# Patient Record
Sex: MALE | Race: WHITE | NOT HISPANIC OR LATINO | Employment: FULL TIME | URBAN - METROPOLITAN AREA
[De-identification: names, ages, dates, MRNs, and addresses within clinical notes are randomized per-mention and may not be internally consistent; named-entity substitution may affect disease eponyms.]

---

## 2018-07-17 ENCOUNTER — OFFICE VISIT (OUTPATIENT)
Dept: FAMILY MEDICINE | Facility: CLINIC | Age: 58
End: 2018-07-17
Payer: COMMERCIAL

## 2018-07-17 VITALS
SYSTOLIC BLOOD PRESSURE: 120 MMHG | WEIGHT: 185 LBS | HEIGHT: 71 IN | HEART RATE: 51 BPM | BODY MASS INDEX: 25.9 KG/M2 | TEMPERATURE: 98 F | OXYGEN SATURATION: 97 % | DIASTOLIC BLOOD PRESSURE: 86 MMHG

## 2018-07-17 DIAGNOSIS — Z12.11 COLON CANCER SCREENING: ICD-10-CM

## 2018-07-17 DIAGNOSIS — N40.1 BENIGN PROSTATIC HYPERPLASIA WITH URINARY OBSTRUCTION: ICD-10-CM

## 2018-07-17 DIAGNOSIS — Z23 NEED FOR TDAP VACCINATION: ICD-10-CM

## 2018-07-17 DIAGNOSIS — Z00.00 ANNUAL PHYSICAL EXAM: Primary | ICD-10-CM

## 2018-07-17 DIAGNOSIS — N13.8 BENIGN PROSTATIC HYPERPLASIA WITH URINARY OBSTRUCTION: ICD-10-CM

## 2018-07-17 PROCEDURE — 99999 PR PBB SHADOW E&M-EST. PATIENT-LVL III: CPT | Mod: PBBFAC,,, | Performed by: FAMILY MEDICINE

## 2018-07-17 PROCEDURE — 99386 PREV VISIT NEW AGE 40-64: CPT | Mod: S$GLB,,, | Performed by: FAMILY MEDICINE

## 2018-07-17 NOTE — PROGRESS NOTES
Office Visit    Patient Name: Dandy Gale    : 1960  MRN: [id number]      Assessment/Plan:  Dandy Gale is a 57 y.o. male who presents today for :    Annual physical exam  -     Hemoglobin A1c; Future; Expected date: 2018  -     CBC Without Differential; Future; Expected date: 2018  -     Comprehensive metabolic panel; Future; Expected date: 2018  -     Hepatitis C antibody; Future; Expected date: 2018  -     Lipid panel; Future; Expected date: 2018    Benign prostatic hyperplasia with urinary obstruction  -     PSA, Screening; Future; Expected date: 2018    Colon cancer screening  -     Case request GI: COLONOSCOPY    Need for Tdap vaccination      -anticipatory guidance provided with age appropriate preventative services discussed, healthy diet and regular physical exercise also discussed with patient  -Patient was advised to get immunization at the pharmacy.  -call clinic back with any questions or concerns    Follow-up for any evaluation as needed.     This note was created by combination of typed  and Dragon dictation.  Transcription errors may be present.  If there are any questions, please contact me.        ----------------------------------------------------------------------------------------------------------------------      HPI:  Dandy is a 57 y.o. male who presents today for:  Annual Exam        Patient Care Team:  Jareth Ventura MD as PCP - General (Family Medicine)  This patient has multiple medical diagnoses as noted below.    This patient is new to me   Patient is doing well and has no major complaints.  Patient denies illicit drug use. No smoking. +Social alcohol use  Does endorse polyuria - which has been a chronic issue for him. Had been evaluated by Urology in the past, but is not on any medications. He does admit to drinking about 6 cups of coffee daily - no urinary changes in the past year. Last PSA was 4.1        Additional  ROS  No F/C/wt changes/fatigue  No dysphagia/sore throat/rhinorrhea  No CP/SOB/palpitations/swelling  No cough/wheezing/SOB  No nausea/vomiting/abd pain/no diarrhea, no constipation, no blood in stool  No muscle aches/joint pain   No rashes  No weakness/HA/tingling/numbness  No anxiety/depression  No dysuria/hematuria  No polydipsia/fatigue/cold or hot intolerance/nocturia      Patient Active Problem List   Diagnosis    Fracture of humerus, proximal, left, closed    Fracture of metacarpal base of left hand, closed    Hand pain, left    First metacarpal bone fracture    Benign prostatic hyperplasia with urinary obstruction       Current Medications  Medications reviewed and updated.       Current Outpatient Prescriptions:     multivitamin capsule, Take 1 capsule by mouth once daily., Disp: , Rfl:     Past Surgical History:   Procedure Laterality Date    FRACTURE SURGERY      l shoulder plate       l thumb plate insertion         Family History   Problem Relation Age of Onset    Prostate cancer Father     Hypertension Mother     Cancer Paternal Grandmother     Heart disease Maternal Grandmother     Melanoma Neg Hx        Social History     Social History    Marital status:      Spouse name: N/A    Number of children: N/A    Years of education: N/A     Occupational History    Not on file.     Social History Main Topics    Smoking status: Former Smoker     Packs/day: 1.00     Years: 10.00    Smokeless tobacco: Former User     Quit date: 2/1/1990    Alcohol use 1.2 oz/week     2 Glasses of wine per week    Drug use: Unknown    Sexual activity: Yes     Partners: Female     Other Topics Concern    Not on file     Social History Narrative    No narrative on file           Allergies   Review of patient's allergies indicates:   Allergen Reactions    Shellfish containing products              Review of Systems  See HPI      Physical Exam  /86   Pulse (!) 51   Temp 98.4 °F (36.9 °C)  "(Oral)   Ht 5' 11" (1.803 m)   Wt 83.9 kg (185 lb)   SpO2 97%   BMI 25.80 kg/m²     GEN: NAD, well developed, pleasant, well nourished  HEENT: NCAT, PERRLA, EOMI, sclera clear, anicteric, bilateral ear exam wnl, O/P clear, MMM with no lesions  NECK: normal, supple with midline trachea, no LAD, no thyromegaly  LUNGS: CTAB, no w/r/r, no increased work of breathing   HEART: RRR, normal S1 and S2, no m/r/g, no edema  ABD: s/nt/nd, NABS  SKIN: normal turgor, no rashes  PSYCH: AOx3, appropriate mood and affect  MSK: warm/well perfused, normal ROM in all extremities, no c/c/e.          "

## 2018-07-18 ENCOUNTER — LAB VISIT (OUTPATIENT)
Dept: LAB | Facility: HOSPITAL | Age: 58
End: 2018-07-18
Attending: FAMILY MEDICINE
Payer: COMMERCIAL

## 2018-07-18 DIAGNOSIS — Z00.00 ANNUAL PHYSICAL EXAM: ICD-10-CM

## 2018-07-18 DIAGNOSIS — N13.8 BENIGN PROSTATIC HYPERPLASIA WITH URINARY OBSTRUCTION: ICD-10-CM

## 2018-07-18 DIAGNOSIS — N40.1 BENIGN PROSTATIC HYPERPLASIA WITH URINARY OBSTRUCTION: ICD-10-CM

## 2018-07-18 LAB
ALBUMIN SERPL BCP-MCNC: 3.9 G/DL
ALP SERPL-CCNC: 70 U/L
ALT SERPL W/O P-5'-P-CCNC: 19 U/L
ANION GAP SERPL CALC-SCNC: 6 MMOL/L
AST SERPL-CCNC: 20 U/L
BILIRUB SERPL-MCNC: 0.7 MG/DL
BUN SERPL-MCNC: 10 MG/DL
CALCIUM SERPL-MCNC: 9.7 MG/DL
CHLORIDE SERPL-SCNC: 103 MMOL/L
CHOLEST SERPL-MCNC: 209 MG/DL
CHOLEST/HDLC SERPL: 4.4 {RATIO}
CO2 SERPL-SCNC: 30 MMOL/L
COMPLEXED PSA SERPL-MCNC: 5.5 NG/ML
CREAT SERPL-MCNC: 1 MG/DL
ERYTHROCYTE [DISTWIDTH] IN BLOOD BY AUTOMATED COUNT: 12.5 %
EST. GFR  (AFRICAN AMERICAN): >60 ML/MIN/1.73 M^2
EST. GFR  (NON AFRICAN AMERICAN): >60 ML/MIN/1.73 M^2
ESTIMATED AVG GLUCOSE: 100 MG/DL
GLUCOSE SERPL-MCNC: 102 MG/DL
HBA1C MFR BLD HPLC: 5.1 %
HCT VFR BLD AUTO: 45.6 %
HCV AB SERPL QL IA: NEGATIVE
HDLC SERPL-MCNC: 47 MG/DL
HDLC SERPL: 22.5 %
HGB BLD-MCNC: 15.6 G/DL
LDLC SERPL CALC-MCNC: 128.8 MG/DL
MCH RBC QN AUTO: 32.2 PG
MCHC RBC AUTO-ENTMCNC: 34.2 G/DL
MCV RBC AUTO: 94 FL
NONHDLC SERPL-MCNC: 162 MG/DL
PLATELET # BLD AUTO: 252 K/UL
PMV BLD AUTO: 11.7 FL
POTASSIUM SERPL-SCNC: 4.2 MMOL/L
PROT SERPL-MCNC: 6.7 G/DL
RBC # BLD AUTO: 4.85 M/UL
SODIUM SERPL-SCNC: 139 MMOL/L
TRIGL SERPL-MCNC: 166 MG/DL
WBC # BLD AUTO: 4.53 K/UL

## 2018-07-18 PROCEDURE — 83036 HEMOGLOBIN GLYCOSYLATED A1C: CPT

## 2018-07-18 PROCEDURE — 84153 ASSAY OF PSA TOTAL: CPT

## 2018-07-18 PROCEDURE — 85027 COMPLETE CBC AUTOMATED: CPT

## 2018-07-18 PROCEDURE — 86803 HEPATITIS C AB TEST: CPT

## 2018-07-18 PROCEDURE — 80061 LIPID PANEL: CPT

## 2018-07-18 PROCEDURE — 36415 COLL VENOUS BLD VENIPUNCTURE: CPT | Mod: PO

## 2018-07-18 PROCEDURE — 80053 COMPREHEN METABOLIC PANEL: CPT

## 2018-11-01 ENCOUNTER — OFFICE VISIT (OUTPATIENT)
Dept: UROLOGY | Facility: CLINIC | Age: 58
End: 2018-11-01
Payer: COMMERCIAL

## 2018-11-01 VITALS
SYSTOLIC BLOOD PRESSURE: 143 MMHG | DIASTOLIC BLOOD PRESSURE: 79 MMHG | HEART RATE: 56 BPM | BODY MASS INDEX: 25.83 KG/M2 | WEIGHT: 185.19 LBS

## 2018-11-01 DIAGNOSIS — N13.8 BENIGN PROSTATIC HYPERPLASIA WITH URINARY OBSTRUCTION: Primary | ICD-10-CM

## 2018-11-01 DIAGNOSIS — R97.20 ELEVATED PSA: ICD-10-CM

## 2018-11-01 DIAGNOSIS — N40.1 BENIGN PROSTATIC HYPERPLASIA WITH URINARY OBSTRUCTION: Primary | ICD-10-CM

## 2018-11-01 PROCEDURE — 99999 PR PBB SHADOW E&M-EST. PATIENT-LVL III: CPT | Mod: PBBFAC,,, | Performed by: UROLOGY

## 2018-11-01 PROCEDURE — 99214 OFFICE O/P EST MOD 30 MIN: CPT | Mod: S$GLB,,, | Performed by: UROLOGY

## 2018-11-01 PROCEDURE — 3008F BODY MASS INDEX DOCD: CPT | Mod: CPTII,S$GLB,, | Performed by: UROLOGY

## 2018-11-01 RX ORDER — LIDOCAINE HYDROCHLORIDE 10 MG/ML
10 INJECTION INFILTRATION; PERINEURAL ONCE
Status: DISCONTINUED | OUTPATIENT
Start: 2018-11-01 | End: 2020-01-07

## 2018-11-01 RX ORDER — LIDOCAINE HYDROCHLORIDE 20 MG/ML
JELLY TOPICAL ONCE
Status: DISCONTINUED | OUTPATIENT
Start: 2018-11-01 | End: 2020-01-07

## 2018-11-01 RX ORDER — CIPROFLOXACIN 500 MG/1
500 TABLET ORAL 2 TIMES DAILY
Qty: 4 TABLET | Refills: 0 | Status: SHIPPED | OUTPATIENT
Start: 2018-11-01 | End: 2020-01-07

## 2018-11-01 NOTE — PROGRESS NOTES
Clinic Note  11/1/2018      Subjective:         Chief Complaint:   HPI  Dandy Gale is a 58 y.o. male with a history of BPH. Has not been checked in  3years.  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  Positive family history (father at 59, brother at 55).          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 MID FOREARM:     Family History   Problem Relation Age of Onset    Prostate cancer Father     Hypertension Mother     Cancer Paternal Grandmother     Heart disease Maternal Grandmother     Melanoma Neg Hx      Social History     Socioeconomic History    Marital status:      Spouse name: Not on file    Number of children: Not on file    Years of education: Not on file    Highest education level: Not on file   Social Needs    Financial resource strain: Not on file    Food insecurity - worry: Not on file    Food insecurity - inability: Not on file    Transportation needs - medical: Not on file    Transportation needs - non-medical: Not on file   Occupational History    Not on file   Tobacco Use    Smoking status: Former Smoker     Packs/day: 1.00     Years: 10.00     Pack years: 10.00    Smokeless tobacco: Former User     Quit date: 2/1/1990   Substance and Sexual Activity    Alcohol use: Yes     Alcohol/week: 1.2 oz     Types: 2 Glasses of wine per week    Drug use: Not on file    Sexual activity: Yes     Partners: Female   Other Topics Concern    Not on file   Social History Narrative    Not on file     Past Surgical History:   Procedure Laterality Date    FRACTURE SURGERY      l shoulder plate       l thumb plate insertion      ORIF, FRACTURE, HUMERUS Left 10/4/2013    [facility]    ORIF, FRACTURE,  "METACARPAL BONE Left 10/4/2013    [facility]    REMOVAL, IMPLANT Left 2/28/2014    [facility] at [facility]     Patient Active Problem List   Diagnosis    Fracture of humerus, proximal, left, closed    Fracture of metacarpal base of left hand, closed    Hand pain, left    First metacarpal bone fracture    Benign prostatic hyperplasia with urinary obstruction     Review of Systems   Constitutional: Negative for appetite change, chills, fatigue, fever and unexpected weight change.   HENT: Negative for nosebleeds.    Respiratory: Negative for shortness of breath and wheezing.    Cardiovascular: Negative for chest pain, palpitations and leg swelling.   Gastrointestinal: Negative for abdominal distention, abdominal pain, constipation, diarrhea, nausea and vomiting.   Genitourinary: Positive for nocturia. Negative for hematuria.        3x/noc   Musculoskeletal: Negative for arthralgias and back pain.   Skin: Negative for pallor.   Neurological: Negative for dizziness, seizures and syncope.   Hematological: Negative for adenopathy.   Psychiatric/Behavioral: Negative for dysphoric mood.         Objective:      There were no vitals taken for this visit.  Estimated body mass index is 25.8 kg/m² as calculated from the following:    Height as of 7/17/18: 5' 11" (1.803 m).    Weight as of 7/17/18: 83.9 kg (185 lb).  Physical Exam   Constitutional: He is oriented to person, place, and time. He appears well-developed and well-nourished. No distress.   HENT:   Head: Atraumatic.   Neck: No tracheal deviation present.   Cardiovascular: Normal rate.    Pulmonary/Chest: Effort normal. No respiratory distress. He has no wheezes.   Abdominal: Soft. Bowel sounds are normal. He exhibits no distension and no mass. There is no tenderness. There is no rebound and no guarding.   Genitourinary: Rectum normal. Rectal exam shows no external hemorrhoid, no internal hemorrhoid, no fissure, " no mass and no tenderness. Prostate is enlarged.   Genitourinary Comments: 50 ccs   Neurological: He is alert and oriented to person, place, and time.   Skin: Skin is warm and dry. He is not diaphoretic.     Psychiatric: He has a normal mood and affect. His behavior is normal. Judgment and thought content normal.         Assessment and Plan:           Problem List Items Addressed This Visit     Benign prostatic hyperplasia with urinary obstruction - Primary          Follow up:   Discussed PSA screening.  Reviewed NIH risk data.  Letter to Dr. Ventura.  Schedule TRUS biopsy.    Porfirio Nunez

## 2018-11-01 NOTE — LETTER
November 1, 2018        Jareth Ventura MD  [address]  [place] [address]             [facility]  [address] [facility] [address]  Phone: [phone]   Patient: Dandy Gale   MR Number: [id number]   YOB: 1960   Date of Visit: 11/1/2018       Dear Dr. Ventura:    Thank you for referring Dandy Gale to me for evaluation. Attached you will find relevant portions of my assessment and plan of care.    If you have questions, please do not hesitate to call me. I look forward to following Dandy Gale along with you.    Sincerely,      Porfirio Nunez MD            CC  No Recipients    Enclosure

## 2018-11-15 ENCOUNTER — PROCEDURE VISIT (OUTPATIENT)
Dept: UROLOGY | Facility: CLINIC | Age: 58
End: 2018-11-15
Payer: COMMERCIAL

## 2018-11-15 VITALS
BODY MASS INDEX: 26.04 KG/M2 | WEIGHT: 192.25 LBS | DIASTOLIC BLOOD PRESSURE: 82 MMHG | HEART RATE: 65 BPM | SYSTOLIC BLOOD PRESSURE: 132 MMHG | HEIGHT: 72 IN | RESPIRATION RATE: 18 BRPM | TEMPERATURE: 98 F

## 2018-11-15 DIAGNOSIS — R97.20 ELEVATED PSA: ICD-10-CM

## 2018-11-15 PROCEDURE — 76942 ECHO GUIDE FOR BIOPSY: CPT | Mod: 26,59,S$GLB, | Performed by: UROLOGY

## 2018-11-15 PROCEDURE — 88305 TISSUE EXAM BY PATHOLOGIST: CPT | Performed by: PATHOLOGY

## 2018-11-15 PROCEDURE — 88305 TISSUE EXAM BY PATHOLOGIST: CPT | Mod: 26,,, | Performed by: PATHOLOGY

## 2018-11-15 PROCEDURE — 55700 TRANSRECTAL ULTRASOUND W/ BIOPSY: CPT | Mod: S$GLB,,, | Performed by: UROLOGY

## 2018-11-15 PROCEDURE — 76872 US TRANSRECTAL: CPT | Mod: 26,S$GLB,, | Performed by: UROLOGY

## 2018-11-15 RX ORDER — LIDOCAINE HYDROCHLORIDE 20 MG/ML
JELLY TOPICAL ONCE
Status: COMPLETED | OUTPATIENT
Start: 2018-11-15 | End: 2018-11-15

## 2018-11-15 RX ORDER — LIDOCAINE HYDROCHLORIDE 10 MG/ML
10 INJECTION INFILTRATION; PERINEURAL ONCE
Status: COMPLETED | OUTPATIENT
Start: 2018-11-15 | End: 2018-11-15

## 2018-11-15 RX ADMIN — LIDOCAINE HYDROCHLORIDE: 20 JELLY TOPICAL at 02:11

## 2018-11-15 RX ADMIN — LIDOCAINE HYDROCHLORIDE 10 ML: 10 INJECTION INFILTRATION; PERINEURAL at 03:11

## 2018-11-15 NOTE — PROCEDURES
Transrectal Ultrasound w/ Biopsy  Date/Time: 11/15/2018 3:20 PM  Performed by: Porfirio Nunez MD  Authorized by: Porfirio Nunez MD     Consent Done?:  Yes (Written)  Indications: Elevated PSA    Preparation: Patient was prepped and draped in usual sterile fashion    Position:  Left lateral  Anesthesia:  Pudendal nerve block, 20cc's 1% Lidocaine and Lidocaine jelly  Patient sedated: No    Prostate Size:  62 ccs  Lesions:: Yes         Type:  Hypoechoic (RB)  Left Base Biopsies: 2  Left Mid Biopsies: 2  Left [place] Biopsies: 2  Right Base Biopsies: 2  Right Mid Biopsies: 2  Right [place] Biopsies: 2  Transitional zone: No    Total Biopsies:  12    Patient tolerance:  Patient tolerated the procedure well with no immediate complications     Significant hyperplasia, moderate sized median lobe.

## 2018-11-15 NOTE — PATIENT INSTRUCTIONS

## 2019-06-03 ENCOUNTER — PATIENT MESSAGE (OUTPATIENT)
Dept: FAMILY MEDICINE | Facility: CLINIC | Age: 59
End: 2019-06-03

## 2019-06-03 DIAGNOSIS — L98.9 SKIN LESION: Primary | ICD-10-CM

## 2019-06-03 DIAGNOSIS — Z00.00 ANNUAL PHYSICAL EXAM: ICD-10-CM

## 2019-06-27 ENCOUNTER — PATIENT MESSAGE (OUTPATIENT)
Dept: FAMILY MEDICINE | Facility: CLINIC | Age: 59
End: 2019-06-27

## 2019-09-18 ENCOUNTER — OFFICE VISIT (OUTPATIENT)
Dept: DERMATOLOGY | Facility: CLINIC | Age: 59
End: 2019-09-18
Payer: COMMERCIAL

## 2019-09-18 DIAGNOSIS — Z12.83 SKIN CANCER SCREENING: ICD-10-CM

## 2019-09-18 DIAGNOSIS — D18.00 ANGIOMA: ICD-10-CM

## 2019-09-18 DIAGNOSIS — L72.0 MILIA: ICD-10-CM

## 2019-09-18 DIAGNOSIS — L72.0 EIC (EPIDERMAL INCLUSION CYST): ICD-10-CM

## 2019-09-18 DIAGNOSIS — D69.2 SENILE PURPURA: Primary | ICD-10-CM

## 2019-09-18 DIAGNOSIS — D22.9 MULTIPLE BENIGN NEVI: ICD-10-CM

## 2019-09-18 DIAGNOSIS — L57.0 AK (ACTINIC KERATOSIS): ICD-10-CM

## 2019-09-18 DIAGNOSIS — L82.1 SK (SEBORRHEIC KERATOSIS): ICD-10-CM

## 2019-09-18 DIAGNOSIS — L81.4 LENTIGINES: ICD-10-CM

## 2019-09-18 PROCEDURE — 99202 OFFICE O/P NEW SF 15 MIN: CPT | Mod: 25,S$GLB,, | Performed by: DERMATOLOGY

## 2019-09-18 PROCEDURE — 99999 PR PBB SHADOW E&M-EST. PATIENT-LVL III: CPT | Mod: PBBFAC,,, | Performed by: DERMATOLOGY

## 2019-09-18 PROCEDURE — 99202 PR OFFICE/OUTPT VISIT, NEW, LEVL II, 15-29 MIN: ICD-10-PCS | Mod: 25,S$GLB,, | Performed by: DERMATOLOGY

## 2019-09-18 PROCEDURE — 99999 PR PBB SHADOW E&M-EST. PATIENT-LVL III: ICD-10-PCS | Mod: PBBFAC,,, | Performed by: DERMATOLOGY

## 2019-09-18 PROCEDURE — 17000 DESTRUCT PREMALG LESION: CPT | Mod: S$GLB,,, | Performed by: DERMATOLOGY

## 2019-09-18 PROCEDURE — 17000 PR DESTRUCTION(LASER SURGERY,CRYOSURGERY,CHEMOSURGERY),PREMALIGNANT LESIONS,FIRST LESION: ICD-10-PCS | Mod: S$GLB,,, | Performed by: DERMATOLOGY

## 2019-09-18 NOTE — PROGRESS NOTES
"  Subjective:       Patient ID:  Dandy Gale is a 59 y.o. male who presents for   Chief Complaint   Patient presents with    Skin Check     Patient is here today for a "mole" check.   Pt has a history of  moderate sun exposure in the past.   Pt recalls several blistering sunburns in the past- Yes  Pt has history of tanning bed use- No  Pt has  had moles removed in the past- Yes  Pt has history of melanoma in first degree relatives-  Not sure    Pt presents today for UBSE, h/o SCC left mid forearm excised 2/2016  Pt c/o spot on left forearm, smaller, redness, 3 months, Tx.none      Review of Systems   Constitutional: Negative for fever, chills, weight loss, weight gain, fatigue, night sweats and malaise.   Skin: Positive for activity-related sunscreen use and wears hat. Negative for daily sunscreen use and recent sunburn.   Hematologic/Lymphatic: Does not bruise/bleed easily.        Objective:    Physical Exam   Constitutional: He appears well-developed and well-nourished. No distress.   Neurological: He is alert and oriented to person, place, and time. He is not disoriented.   Psychiatric: He has a normal mood and affect.   Skin:   Areas Examined (abnormalities noted in diagram):   Scalp / Hair Palpated and Inspected  Head / Face Inspection Performed  Neck Inspection Performed  Chest / Axilla Inspection Performed  Abdomen Inspection Performed  Back Inspection Performed  RUE Inspected  LUE Inspection Performed                   Diagram Legend     Erythematous scaling macule/papule c/w actinic keratosis       Vascular papule c/w angioma      Pigmented verrucoid papule/plaque c/w seborrheic keratosis      Yellow umbilicated papule c/w sebaceous hyperplasia      Irregularly shaped tan macule c/w lentigo     1-2 mm smooth white papules consistent with Milia      Movable subcutaneous cyst with punctum c/w epidermal inclusion cyst      Subcutaneous movable cyst c/w pilar cyst      Firm pink to brown papule c/w " dermatofibroma      Pedunculated fleshy papule(s) c/w skin tag(s)      Evenly pigmented macule c/w junctional nevus     Mildly variegated pigmented, slightly irregular-bordered macule c/w mildly atypical nevus      Flesh colored to evenly pigmented papule c/w intradermal nevus       Pink pearly papule/plaque c/w basal cell carcinoma      Erythematous hyperkeratotic cursted plaque c/w SCC      Surgical scar with no sign of skin cancer recurrence      Open and closed comedones      Inflammatory papules and pustules      Verrucoid papule consistent consistent with wart     Erythematous eczematous patches and plaques     Dystrophic onycholytic nail with subungual debris c/w onychomycosis     Umbilicated papule    Erythematous-base heme-crusted tan verrucoid plaque consistent with inflamed seborrheic keratosis     Erythematous Silvery Scaling Plaque c/w Psoriasis     See annotation      Assessment / Plan:        Senile purpura  This is easy bruising of the skin due to thinning of skin with age and years of sun exposure.  Reassurance provided. No treatment is necessary besides sun protection.    SK (seborrheic keratosis)  These are benign inherited growths without a malignant potential. Reassurance given to patient. No treatment is necessary.   Treatment of benign, asymptomatic lesions may be considered cosmetic.  Warned about risk of hypo- or hyperpigmentation with treatment and risk of recurrence.    Lentigines  These are benign sun spots which should be monitored for changes. Patient instructed in importance of daily broad spectrum sunscreen use with spf at least 30. Sun avoidance and topical protection/protective clothing discussed.    Multiple benign nevi  Benign-appearing on exam today. Counseled pt to monitor mole(s) and return to clinic if any changes noted or symptoms (bleeding, itching, pain, etc) noted. Brochure provided.    Angioma  This is a benign vascular lesion. Reassurance given. No treatment required.  Treatment of benign, asymptomatic lesions may be considered cosmetic.    EIC (epidermal inclusion cyst)  This is a benign cyst of the hair follicle. Reassurance provided. No treatment is necessary unless it is symptomatic.     Milia  This is a benign cyst. Reassurance provided. No treatment is necessary unless it is symptomatic. These may resolve on their own.    AK (actinic keratosis)  Cryosurgery Procedure Note    Verbal consent from the patient is obtained including, but not limited to, risk of hypopigmentation/hyperpigmentation, scar, recurrence of lesion. The patient is aware of the precancerous quality and need for treatment of these lesions. Liquid nitrogen cryosurgery is applied to the 1 actinic keratoses, as detailed in the physical exam, to produce a freeze injury. The patient is aware that blisters may form and is instructed on wound care with gentle cleansing and use of vaseline ointment to keep moist until healed. The patient is supplied a handout on cryosurgery and is instructed to call if lesions do not completely resolve.    Skin cancer screening  Upper body skin examination performed today including at least 6 points as noted in physical examination. No lesions suspicious for malignancy noted.  Patient instructed in importance of daily broad spectrum sunscreen use with spf at least 30. Sun avoidance and topical protection/protective clothing discussed.    Follow up in about 4 months (around 1/18/2020) to recheck lentigo on eyelid or sooner if any changes noted

## 2019-09-18 NOTE — PATIENT INSTRUCTIONS
Summer Sun Protection      The [facility]at[facility] would like to remind you of the importance of sun protection all year round and particularly during the summer when the suns rays are the strongest. It has been proven that both acute and chronic sun exposure damages our cells and leads to skin cancer. Beyond skin cancer, the sun causes 90% of the symptoms of pre-mature skin aging, including wrinkles, lentigines (brown spots), and thin, easily bruised skin. Proper sun protection can help prevent these unwanted conditions.    Many patients report that the dont go in the sun. It has been shown that the average person receives 18 hours of incidental sun exposure per week during activities such as walking through parking lots, driving, or sitting next to windows. This accumulates to several bad sunburns per year!    In choosing sunscreen, you want one that protects against both UVA and UVB rays. It is recommended that you use one of SPF 30 or higher. It is important to apply the sunscreen about 20 minutes prior to sun exposure. Most sunscreens are chemical sunscreens and a reaction must take place in the skin so that they are effective. If they are applied and then you are immediately exposed to the sun or start sweating, this reaction has not had time to take place and you are therefore unprotected. Sunscreen needs to be reapplied every 2 hours if you are participating in water sports or sweating. We recommend Elta MD or Neutrogena Ultra Sheer Dry Touch SPF 55 for daily use; however there are many options and it is most important for you to find one that you will use on a consistent basis.    If you have sensitive skin, you may do best with a sunscreen that contains only physical blockers such as titanium dioxide or zinc oxide. These are typically thicker and harder to apply, however they afford very good protection. Neutrogena Sensitive Skin, Blue Lizard Sensitive Skin (pink top) or Neutrogena Pure  and Free are popular ones.     Aside from sunscreen, clothes with UV protection, wide brimmed hats, and sunglasses are other means of sun protection that we recommend.                        [facility] - DERMAT[facility]  [address]  [place] [address]  Dept: [phone]  Dept Fax: [phone]                                                                               CRYOSURGERY      Your doctor has used a method called cryosurgery to treat your skin condition. Cryosurgery refers to the use of very cold substances to treat a variety of skin conditions such as warts, pre-skin cancers, molluscum contagiosum, sun spots, and several benign growths. The substance we use in cryosurgery is liquid nitrogen and is so cold (-195 degrees Celsius) that is burns when administered.     Following treatment in the office, the skin may immediately burn and become red. You may find the area around the lesion is affected as well. It is sometimes necessary to treat not only the lesion, but a small area of the surrounding normal skin to achieve a good response.     A blister, and even a blood filled blister, may form after treatment.   This is a normal response. If the blister is painful, it is acceptable to sterilize a needle and with rubbing alcohol and gently pop the blister. It is important that you gently wash the area with soap and warm water as the blister fluid may contain wart virus if a wart was treated. Do no remove the roof of the blister.     The area treated can take anywhere from 1-3 weeks to heal. Healing time depends on the kind skin lesion treated, the location, and how aggressively the lesion was treated. It is recommended that the areas treated are covered with Vaseline or bacitracin ointment and a band-aid. If a band-aid is not practical, just ointment applied several times per day will do. Keeping these areas moist will speed the healing time.    Treatment with liquid  nitrogen can leave a scar. In dark skin, it may be a light or dark scar, in light skin it may be a white or pink scar. These will generally fade with time, but may never go away completely.     If you have any concerns after your treatment, please feel free to call the office.       [facility] [place] [address]/ [phone] FAX/ [email or web address]

## 2019-09-18 NOTE — LETTER
September 18, 2019      Jareth Ventura MD  [address]  [place] [address]           [facility]  [address]  [place] [address]  Phone: [phone]  Fax: [phone]          Patient: Dandy Gale   MR Number: [id number]   YOB: 1960   Date of Visit: 9/18/2019       Dear Dr. Jareth Ventura:    Thank you for referring Dandy Gale to me for evaluation. Attached you will find relevant portions of my assessment and plan of care.    If you have questions, please do not hesitate to call me. I look forward to following Dandy Gale along with you.    Sincerely,    Michelle Zendejas MD    Enclosure  CC:  No Recipients    If you would like to receive this communication electronically, please contact [email or web address] or [phone] to request more information on [facility] access.    For providers and/or their staff who would like to refer a patient to [facility], please contact us through our one-stop-shop provider referral line, [facility], at [phone].    If you feel you have received this communication in error or would no longer like to receive these types of communications, please e-mail [email or web address]

## 2019-10-20 ENCOUNTER — PATIENT MESSAGE (OUTPATIENT)
Dept: FAMILY MEDICINE | Facility: CLINIC | Age: 59
End: 2019-10-20

## 2019-12-27 ENCOUNTER — LAB VISIT (OUTPATIENT)
Dept: LAB | Facility: OTHER | Age: 59
End: 2019-12-27
Attending: FAMILY MEDICINE
Payer: COMMERCIAL

## 2019-12-27 DIAGNOSIS — Z00.00 ANNUAL PHYSICAL EXAM: ICD-10-CM

## 2019-12-27 LAB
ALBUMIN SERPL BCP-MCNC: 3.7 G/DL (ref 3.5–5.2)
ALP SERPL-CCNC: 71 U/L (ref 55–135)
ALT SERPL W/O P-5'-P-CCNC: 19 U/L (ref 10–44)
ANION GAP SERPL CALC-SCNC: 9 MMOL/L (ref 8–16)
AST SERPL-CCNC: 16 U/L (ref 10–40)
BILIRUB SERPL-MCNC: 0.5 MG/DL (ref 0.1–1)
BUN SERPL-MCNC: 14 MG/DL (ref 6–20)
CALCIUM SERPL-MCNC: 9.6 MG/DL (ref 8.7–10.5)
CHLORIDE SERPL-SCNC: 104 MMOL/L (ref 95–110)
CHOLEST SERPL-MCNC: 237 MG/DL (ref 120–199)
CHOLEST/HDLC SERPL: 4.3 {RATIO} (ref 2–5)
CO2 SERPL-SCNC: 28 MMOL/L (ref 23–29)
CREAT SERPL-MCNC: 0.8 MG/DL (ref 0.5–1.4)
ERYTHROCYTE [DISTWIDTH] IN BLOOD BY AUTOMATED COUNT: 12.7 % (ref 11.5–14.5)
EST. GFR  (AFRICAN AMERICAN): >60 ML/MIN/1.73 M^2
EST. GFR  (NON AFRICAN AMERICAN): >60 ML/MIN/1.73 M^2
ESTIMATED AVG GLUCOSE: 103 MG/DL (ref 68–131)
GLUCOSE SERPL-MCNC: 95 MG/DL (ref 70–110)
HBA1C MFR BLD HPLC: 5.2 % (ref 4–5.6)
HCT VFR BLD AUTO: 47 % (ref 40–54)
HDLC SERPL-MCNC: 55 MG/DL (ref 40–75)
HDLC SERPL: 23.2 % (ref 20–50)
HGB BLD-MCNC: 16 G/DL (ref 14–18)
LDLC SERPL CALC-MCNC: 122.6 MG/DL (ref 63–159)
MCH RBC QN AUTO: 31.3 PG (ref 27–31)
MCHC RBC AUTO-ENTMCNC: 34 G/DL (ref 32–36)
MCV RBC AUTO: 92 FL (ref 82–98)
NONHDLC SERPL-MCNC: 182 MG/DL
PLATELET # BLD AUTO: 283 K/UL (ref 150–350)
PMV BLD AUTO: 10.8 FL (ref 9.2–12.9)
POTASSIUM SERPL-SCNC: 4.2 MMOL/L (ref 3.5–5.1)
PROT SERPL-MCNC: 6.6 G/DL (ref 6–8.4)
RBC # BLD AUTO: 5.12 M/UL (ref 4.6–6.2)
SODIUM SERPL-SCNC: 141 MMOL/L (ref 136–145)
TRIGL SERPL-MCNC: 297 MG/DL (ref 30–150)
WBC # BLD AUTO: 5.32 K/UL (ref 3.9–12.7)

## 2019-12-27 PROCEDURE — 36415 COLL VENOUS BLD VENIPUNCTURE: CPT

## 2019-12-27 PROCEDURE — 85027 COMPLETE CBC AUTOMATED: CPT

## 2019-12-27 PROCEDURE — 80061 LIPID PANEL: CPT

## 2019-12-27 PROCEDURE — 80053 COMPREHEN METABOLIC PANEL: CPT

## 2019-12-27 PROCEDURE — 83036 HEMOGLOBIN GLYCOSYLATED A1C: CPT

## 2020-01-07 ENCOUNTER — OFFICE VISIT (OUTPATIENT)
Dept: FAMILY MEDICINE | Facility: CLINIC | Age: 60
End: 2020-01-07
Payer: COMMERCIAL

## 2020-01-07 VITALS
WEIGHT: 191.69 LBS | BODY MASS INDEX: 25.96 KG/M2 | SYSTOLIC BLOOD PRESSURE: 124 MMHG | HEIGHT: 72 IN | DIASTOLIC BLOOD PRESSURE: 82 MMHG

## 2020-01-07 DIAGNOSIS — Z00.00 ANNUAL PHYSICAL EXAM: Primary | ICD-10-CM

## 2020-01-07 DIAGNOSIS — E78.49 OTHER HYPERLIPIDEMIA: ICD-10-CM

## 2020-01-07 DIAGNOSIS — Z23 ENCOUNTER FOR ADMINISTRATION OF VACCINE: ICD-10-CM

## 2020-01-07 DIAGNOSIS — R97.20 ELEVATED PSA: ICD-10-CM

## 2020-01-07 DIAGNOSIS — Z12.11 COLON CANCER SCREENING: ICD-10-CM

## 2020-01-07 DIAGNOSIS — N40.1 BENIGN PROSTATIC HYPERPLASIA WITH URINARY OBSTRUCTION: ICD-10-CM

## 2020-01-07 DIAGNOSIS — N13.8 BENIGN PROSTATIC HYPERPLASIA WITH URINARY OBSTRUCTION: ICD-10-CM

## 2020-01-07 DIAGNOSIS — G47.33 OSA (OBSTRUCTIVE SLEEP APNEA): ICD-10-CM

## 2020-01-07 PROCEDURE — 90471 TD VACCINE GREATER THAN OR EQUAL TO 7YO PRESERVATIVE FREE IM: ICD-10-PCS | Mod: S$GLB,,, | Performed by: FAMILY MEDICINE

## 2020-01-07 PROCEDURE — 90471 IMMUNIZATION ADMIN: CPT | Mod: S$GLB,,, | Performed by: FAMILY MEDICINE

## 2020-01-07 PROCEDURE — 99396 PREV VISIT EST AGE 40-64: CPT | Mod: 25,S$GLB,, | Performed by: FAMILY MEDICINE

## 2020-01-07 PROCEDURE — 90714 TD VACC NO PRESV 7 YRS+ IM: CPT | Mod: S$GLB,,, | Performed by: FAMILY MEDICINE

## 2020-01-07 PROCEDURE — 99999 PR PBB SHADOW E&M-EST. PATIENT-LVL III: CPT | Mod: PBBFAC,,, | Performed by: FAMILY MEDICINE

## 2020-01-07 PROCEDURE — 99999 PR PBB SHADOW E&M-EST. PATIENT-LVL III: ICD-10-PCS | Mod: PBBFAC,,, | Performed by: FAMILY MEDICINE

## 2020-01-07 PROCEDURE — 90714 TD VACCINE GREATER THAN OR EQUAL TO 7YO PRESERVATIVE FREE IM: ICD-10-PCS | Mod: S$GLB,,, | Performed by: FAMILY MEDICINE

## 2020-01-07 PROCEDURE — 99396 PR PREVENTIVE VISIT,EST,40-64: ICD-10-PCS | Mod: 25,S$GLB,, | Performed by: FAMILY MEDICINE

## 2020-01-07 RX ORDER — ROSUVASTATIN CALCIUM 10 MG/1
10 TABLET, COATED ORAL DAILY
Qty: 90 TABLET | Refills: 3 | Status: SHIPPED | OUTPATIENT
Start: 2020-01-07 | End: 2022-02-14

## 2020-01-07 NOTE — PROGRESS NOTES
Office Visit    Patient Name: Dandy Gale    : 1960  MRN: [id number]      Assessment/Plan:  Dandy Gale is a 59 y.o. male who presents today for :    Annual physical exam  Encounter for administration of vaccine  -     (In Office Administered) Td Vaccine - Preservative Free  Colon cancer screening  -     Case request GI: COLONOSCOPY  -previous labs reviewed and discussed with patient  -anticipatory guidance provided with age appropriate preventative services discussed, healthy diet and regular physical exercise also discussed with patient  -any additional health maintenance will be readdressed at the next physical if declined or deferred by the patient today   -Recommend 15-30 minutes of moderate intensity exercise 5 days/week.  -Patient was advised to get [place] vaccines at the pharmacy.      Other hyperlipidemia  -     rosuvastatin (CRESTOR) 10 MG tablet; Take 1 tablet (10 mg total) by mouth once daily.  Dispense: 90 tablet; Refill: 3  -start statin, diet/exercise      Benign prostatic hyperplasia with urinary obstruction  Elevated PSA  -     PSA, Screening; Future; Expected date: 2020  -recheck PSA, due for f/u with Urology        ALONDRA (obstructive sleep apnea)  -     Ambulatory referral to Sleep Disorders  -discussed sleep hygiene, sleep positions, caution and avoidance of potential CNS depressants, as well as regular physical exercises/healthy nutrition and food choices for weight loss.  -will send to Sleep specialist for further eval              Follow up PRN    This note was created by combination of typed  and MModal dictation.  Transcription errors may be present.  If there are any questions, please contact me.        ----------------------------------------------------------------------------------------------------------------------      HPI:  Patient Care Team:  Jareth Ventura MD as PCP - General (Family Medicine)  Ginna Grissom MA as Care Coordinator    Dandy is a  59 y.o. male with      Patient Active Problem List   Diagnosis    Fracture of humerus, proximal, left, closed    Fracture of metacarpal base of left hand, closed    Hand pain, left    First metacarpal bone fracture    Benign prostatic hyperplasia with urinary obstruction    Elevated PSA    Other hyperlipidemia    ALONDRA (obstructive sleep apnea)         Patient presents today for:  Annual Exam        Patient is doing well and has no major complaints today. except for needing a referral to get a sleep study done, he says hi wife has been complaining of loud snoring, as well as patient not feeling very well rested. He otherwise has no other complaints today. Health maintenance-wise, he is due for colon cancer screening. His recent labs were wnl except for elevated total cholesterol.he admits the he does not engage in physical exercises regularly, which I encourage patient to incorporate into daily routine, he denies any cardiovascular or neurologic complaints today        Additional ROS  No F/C/wt changes/fatigue  No dysphagia/sore throat/rhinorrhea  No CP/TRIVEDI/palpitations/swelling  No cough/wheezing/SOB  No nausea/vomiting/abd pain/no diarrhea, no constipation, no blood in stool  No muscle aches/joint pain   No rashes  No MSK weakness/HA/tingling/numbness  No anxiety/depression  No dysuria/hematuria  No polyuria/polydipsia/fatigue/cold or hot intolerance          Current Medications  Medications reviewed and updated.       Current Outpatient Medications:     multivitamin capsule, Take 1 capsule by mouth once daily., Disp: , Rfl:     rosuvastatin (CRESTOR) 10 MG tablet, Take 1 tablet (10 mg total) by mouth once daily., Disp: 90 tablet, Rfl: 3  No current facility-administered medications for this visit.     Past Surgical History:   Procedure Laterality Date    FRACTURE SURGERY      l shoulder plate       l thumb plate insertion         Family History   Problem Relation Age of Onset    Prostate cancer Father  "    Hypertension Mother     Cancer Paternal Grandmother     Heart disease Maternal Grandmother     Melanoma Neg Hx        Social History     Socioeconomic History    Marital status:      Spouse name: Not on file    Number of children: Not on file    Years of education: Not on file    Highest education level: Not on file   Occupational History    Not on file   Social Needs    Financial resource strain: Not on file    Food insecurity:     Worry: Not on file     Inability: Not on file    Transportation needs:     Medical: Not on file     Non-medical: Not on file   Tobacco Use    Smoking status: Former Smoker     Packs/day: 1.00     Years: 10.00     Pack years: 10.00    Smokeless tobacco: Former User     Quit date: 2/1/1990   Substance and Sexual Activity    Alcohol use: Yes     Alcohol/week: 2.0 standard drinks     Types: 2 Glasses of wine per week    Drug use: Not on file    Sexual activity: Yes     Partners: Female   Lifestyle    Physical activity:     Days per week: Not on file     Minutes per session: Not on file    Stress: Not on file   Relationships    Social connections:     Talks on phone: Not on file     Gets together: Not on file     Attends [employer] service: Not on file     Active member of club or organization: Not on file     Attends meetings of clubs or organizations: Not on file     Relationship status: Not on file   Other Topics Concern    Not on file   Social History Narrative    Not on file           Allergies   Review of patient's allergies indicates:   Allergen Reactions    Shellfish containing products              Review of Systems  See HPI      Physical Exam  /82 (BP Location: Left arm, Patient Position: Sitting, BP Method: Large (Automatic))   Ht 5' 11.5" (1.816 m)   Wt 87 kg (191 lb 11 oz)   BMI 26.36 kg/m²     GEN: NAD, well developed, pleasant, well nourished  HEENT: NCAT, PERRLA, EOMI, sclera clear, anicteric, bilateral ear exam wnl, O/P clear, MMM " with no lesions  NECK: normal, supple with midline trachea, no LAD, no thyromegaly  LUNGS: CTAB, no w/r/r, no increased work of breathing   HEART: RRR, normal S1 and S2, no m/r/g, no edema  ABD: s/nt/nd, NABS  SKIN: normal turgor, no rashes  PSYCH: AOx3, appropriate mood and affect  MSK: warm/well perfused, normal ROM in all extremities, no c/c/e.  NEURO: normal without focal findings, CN II-XII are grossly intact.  Sensation/strength grossly normal, gait and station normal.         Labs  Lab Results   Component Value Date    HGBA1C 5.2 12/27/2019     Lab Results   Component Value Date     12/27/2019    K 4.2 12/27/2019     12/27/2019    CO2 28 12/27/2019    BUN 14 12/27/2019    CREATININE 0.8 12/27/2019    CALCIUM 9.6 12/27/2019    ANIONGAP 9 12/27/2019    ESTGFRAFRICA >60 12/27/2019    EGFRNONAA >60 12/27/2019     Lab Results   Component Value Date    CHOL 237 (H) 12/27/2019    CHOL 209 (H) 07/18/2018     Lab Results   Component Value Date    HDL 55 12/27/2019    HDL 47 07/18/2018     Lab Results   Component Value Date    LDLCALC 122.6 12/27/2019    LDLCALC 128.8 07/18/2018     Lab Results   Component Value Date    TRIG 297 (H) 12/27/2019    TRIG 166 (H) 07/18/2018     Lab Results   Component Value Date    CHOLHDL 23.2 12/27/2019    CHOLHDL 22.5 07/18/2018     Last set of blood work has been reviewed as noted above.

## 2020-01-15 DIAGNOSIS — Z12.11 SPECIAL SCREENING FOR MALIGNANT NEOPLASMS, COLON: Primary | ICD-10-CM

## 2020-01-15 RX ORDER — POLYETHYLENE GLYCOL 3350, SODIUM SULFATE ANHYDROUS, SODIUM BICARBONATE, SODIUM CHLORIDE, POTASSIUM CHLORIDE 236; 22.74; 6.74; 5.86; 2.97 G/4L; G/4L; G/4L; G/4L; G/4L
4 POWDER, FOR SOLUTION ORAL ONCE
Qty: 4000 ML | Refills: 0 | Status: SHIPPED | OUTPATIENT
Start: 2020-01-15 | End: 2020-01-15

## 2020-02-04 ENCOUNTER — PATIENT OUTREACH (OUTPATIENT)
Dept: ADMINISTRATIVE | Facility: OTHER | Age: 60
End: 2020-02-04

## 2020-02-05 ENCOUNTER — OFFICE VISIT (OUTPATIENT)
Dept: SLEEP MEDICINE | Facility: CLINIC | Age: 60
End: 2020-02-05
Payer: COMMERCIAL

## 2020-02-05 VITALS
WEIGHT: 193.13 LBS | SYSTOLIC BLOOD PRESSURE: 122 MMHG | HEIGHT: 71 IN | HEART RATE: 66 BPM | DIASTOLIC BLOOD PRESSURE: 71 MMHG | BODY MASS INDEX: 27.04 KG/M2

## 2020-02-05 DIAGNOSIS — G47.01 INSOMNIA DUE TO MEDICAL CONDITION: ICD-10-CM

## 2020-02-05 DIAGNOSIS — R53.82 CHRONIC FATIGUE: ICD-10-CM

## 2020-02-05 DIAGNOSIS — R06.83 SNORING: Primary | ICD-10-CM

## 2020-02-05 PROCEDURE — 3008F BODY MASS INDEX DOCD: CPT | Mod: CPTII,S$GLB,, | Performed by: INTERNAL MEDICINE

## 2020-02-05 PROCEDURE — 99204 OFFICE O/P NEW MOD 45 MIN: CPT | Mod: S$GLB,,, | Performed by: INTERNAL MEDICINE

## 2020-02-05 PROCEDURE — 99999 PR PBB SHADOW E&M-EST. PATIENT-LVL III: CPT | Mod: PBBFAC,,, | Performed by: INTERNAL MEDICINE

## 2020-02-05 PROCEDURE — 99204 PR OFFICE/OUTPT VISIT, NEW, LEVL IV, 45-59 MIN: ICD-10-PCS | Mod: S$GLB,,, | Performed by: INTERNAL MEDICINE

## 2020-02-05 PROCEDURE — 3008F PR BODY MASS INDEX (BMI) DOCUMENTED: ICD-10-PCS | Mod: CPTII,S$GLB,, | Performed by: INTERNAL MEDICINE

## 2020-02-05 PROCEDURE — 99999 PR PBB SHADOW E&M-EST. PATIENT-LVL III: ICD-10-PCS | Mod: PBBFAC,,, | Performed by: INTERNAL MEDICINE

## 2020-02-05 NOTE — PROGRESS NOTES
Subjective:       Patient ID: Dandy Gale is a 59 y.o. male.    Chief Complaint: Sleeping Problem    HPI     I had the pleasure of seeing Dandy Gale today, who is a 59 y.o. male that presents with snoring and fatigue.        Dandy Gale presents with has interrupted sleep, has snoring and has daytime sleepiness that has been going on for 3 years    Bedtime when working ranges from 2100 to 2200.   When not working, bedtime ranges from 2100 to 2200.   Sleep latency ranges from 10 to 15 minutes.   Average number of awakenings is 2-3 and return to sleep is variable.   Wake up time when working is 0600 to 0600.   When not working, wake up time is 0600 to 0700.   Patient does notrested upon awakening.    Dandy Gale consumes approximately 4-5 beverages with caffeine are consumed daily.   An average of 1 beverages with alcohol are consumed daily   Medications taken for sleep currently: none  Previous medications taken: none     Dandy Gale does experience daytime sleepiness.   Naps are taken about 1-2 times weekly, usually lasting 45 to 60 minutes.  Dandy currently does operate an automobile.  Dandy Gale does not experience drowsiness when driving.   Patient does doze off when sedentary.   Dandy Gale does not have auxiliary symptoms of narcolepsy including sleep onset paralysis, hypnagogic hallucinations, sleep attacks and cataplexy  ESS 6.    Dandy Gale has a history of snoring.   Snoring is described as moderate and constant.   Apneic episodes have been noticed during sleep.   A witness to sleep is not present.   The patient awakens with mouth dryness.      Dandy Gale does not not have symptoms of Restless Legs Syndrome. Nocturnal leg movements have not been noticed.   The patient does not experience sleep related leg cramps.   There is not a history of parasomnia.      Current Outpatient Medications:     multivitamin capsule, Take 1 capsule by mouth once daily., Disp:  , Rfl:     rosuvastatin (CRESTOR) 10 MG tablet, Take 1 tablet (10 mg total) by mouth once daily., Disp: 90 tablet, Rfl: 3     Review of patient's allergies indicates:   Allergen Reactions    Shellfish containing products          Past Medical History:   Diagnosis Date    High blood cholesterol     Squamous cell carcinoma 2/2016    LEFT MID FOREARM:       Past Surgical History:   Procedure Laterality Date    FRACTURE SURGERY      l shoulder plate       l thumb plate insertion         Family History   Problem Relation Age of Onset    Prostate cancer Father     Hypertension Mother     Cancer Paternal Grandmother     Heart disease Maternal Grandmother     Melanoma Neg Hx        Social History     Socioeconomic History    Marital status:      Spouse name: Not on file    Number of children: Not on file    Years of education: Not on file    Highest education level: Not on file   Occupational History    Not on file   Social Needs    Financial resource strain: Not on file    Food insecurity:     Worry: Not on file     Inability: Not on file    Transportation needs:     Medical: Not on file     Non-medical: Not on file   Tobacco Use    Smoking status: Former Smoker     Packs/day: 1.00     Years: 10.00     Pack years: 10.00    Smokeless tobacco: Former User     Quit date: 2/1/1990   Substance and Sexual Activity    Alcohol use: Yes     Alcohol/week: 2.0 standard drinks     Types: 2 Glasses of wine per week    Drug use: Not on file    Sexual activity: Yes     Partners: Female   Lifestyle    Physical activity:     Days per week: Not on file     Minutes per session: Not on file    Stress: Not on file   Relationships    Social connections:     Talks on phone: Not on file     Gets together: Not on file     Attends [employer] service: Not on file     Active member of club or organization: Not on file     Attends meetings of clubs or organizations: Not on file     Relationship status: Not on file    Other Topics Concern    Not on file   Social History Narrative    Not on file           Old medical records.    Vitals:    02/05/20 0941   BP: 122/71   Pulse: 66         Dandy was seen today for sleeping problem.    Diagnoses and all orders for this visit:    Snoring    Chronic fatigue    Insomnia due to medical condition                 The patient was given open opportunity to ask questions and/or express concerns about treatment plan.   All questions/concerns were discussed.   Driving precautions were provided.     Two patient identifiers used prior to evaluation.    Thank you for referring Dandy Gale for evaluation.           Review of Systems   Constitutional: Positive for fatigue. Negative for activity change, appetite change, chills, diaphoresis, fever and unexpected weight change.   HENT: Negative for congestion, dental problem, drooling, facial swelling, hearing loss, mouth sores, nosebleeds, postnasal drip, rhinorrhea, sneezing, sore throat, tinnitus, trouble swallowing and voice change.    Eyes: Negative for photophobia and visual disturbance.   Respiratory: Positive for apnea. Negative for cough, choking, chest tightness, shortness of breath, wheezing and stridor.    Cardiovascular: Negative for chest pain, palpitations and leg swelling.   Gastrointestinal: Negative for abdominal distention, abdominal pain, blood in stool, constipation, diarrhea, nausea and vomiting.   Endocrine: Negative for cold intolerance, heat intolerance, polydipsia, polyphagia and polyuria.   Genitourinary: Negative for enuresis, flank pain and frequency.   Musculoskeletal: Negative for arthralgias, back pain, gait problem, joint swelling, myalgias, neck pain and neck stiffness.   Skin: Negative for rash and wound.   Allergic/Immunologic: Negative for environmental allergies, food allergies and immunocompromised state.   Neurological: Negative for dizziness, tremors, seizures, syncope, facial asymmetry, speech  difficulty, weakness, light-headedness, numbness and headaches.   Hematological: Negative for adenopathy. Does not bruise/bleed easily.   Psychiatric/Behavioral: Positive for sleep disturbance. Negative for agitation, behavioral problems, confusion, decreased concentration, dysphoric mood, hallucinations, self-injury and suicidal ideas. The patient is not nervous/anxious and is not hyperactive.    All other systems reviewed and are negative.      Objective:      Physical Exam   Constitutional: He is oriented to person, place, and time. He appears well-developed and well-nourished. No distress.   HENT:   Head: Normocephalic and atraumatic.   Nose: Nose normal.   Mouth/Throat: Uvula is midline, oropharynx is clear and moist and mucous membranes are normal. He does not have dentures. No uvula swelling. No oropharyngeal exudate or posterior oropharyngeal edema. Tonsils are 0 on the right. Tonsils are 0 on the left. No tonsillar exudate.   Eyes: EOM are normal.   Neck: Normal range of motion. Neck supple. No JVD present. No tracheal deviation present. No thyromegaly present.   Cardiovascular: Normal rate, regular rhythm, normal heart sounds and intact distal pulses. Exam reveals no gallop and no friction rub.   No murmur heard.  Pulmonary/Chest: Effort normal and breath sounds normal. No stridor. No respiratory distress. He has no wheezes. He has no rales. He exhibits no tenderness.   Musculoskeletal: Normal range of motion.   Lymphadenopathy:     He has no cervical adenopathy.   Neurological: He is alert and oriented to person, place, and time. He displays normal reflexes. No cranial nerve deficit. He exhibits normal muscle tone. Coordination normal.   Skin: Skin is warm and dry. He is not diaphoretic.   Psychiatric: He has a normal mood and affect. His behavior is normal. Judgment and thought content normal.   Nursing note and vitals reviewed.      Assessment:       1. Snoring    2. Chronic fatigue    3. Insomnia due  to medical condition        Plan:       Due to listed symptoms, a home sleep apnea test is recommended and ordered.   Description of procedure given to patient.   If significant Obstructive Sleep Apnea (ALONDRA) is found during the initial portion of the study, therapy will be initiated with nasal Continuous Positive Airway Pressure (CPAP).   Goals of therapy were discussed, alternative treatments listed and patient agrees to this form of therapy if indicated.   The pathophysiology of ALONDRA was discussed.   The effects of ALONDRA on patient's co-morbid conditions and the increased morbidity and/or mortality associated with this condition were reviewed.   The patient was given open opportunity to ask questions and/or express concerns about treatment plan.   All questions/concerns were discussed.   Driving precautions were provided.       Thank you for referring Dandy Gale for evaluation.

## 2020-02-05 NOTE — LETTER
February 5, 2020      Jareth Ventura MD  [address]  [place] [address]           [facility] [place] [facility] [address]  [address]  [facility] [address]  Phone: [phone]          Patient: Dandy Gale   MR Number: [id number]   YOB: 1960   Date of Visit: 2/5/2020       Dear Dr. Jareth Ventura:    Thank you for referring Dandy Gale to me for evaluation. Attached you will find relevant portions of my assessment and plan of care.    If you have questions, please do not hesitate to call me. I look forward to following Dandy Gale along with you.    Sincerely,    Tiera Mart MD    Enclosure  CC:  No Recipients    If you would like to receive this communication electronically, please contact [email or web address] or [phone] to request more information on [facility] access.    For providers and/or their staff who would like to refer a patient to [facility], please contact us through our one-stop-shop provider referral line, [facility], at [phone].    If you feel you have received this communication in error or would no longer like to receive these types of communications, please e-mail [email or web address]

## 2020-02-12 ENCOUNTER — ANESTHESIA (OUTPATIENT)
Dept: ENDOSCOPY | Facility: HOSPITAL | Age: 60
End: 2020-02-12
Payer: COMMERCIAL

## 2020-02-12 ENCOUNTER — ANESTHESIA EVENT (OUTPATIENT)
Dept: ENDOSCOPY | Facility: HOSPITAL | Age: 60
End: 2020-02-12
Payer: COMMERCIAL

## 2020-02-12 ENCOUNTER — HOSPITAL ENCOUNTER (OUTPATIENT)
Facility: HOSPITAL | Age: 60
Discharge: HOME | End: 2020-02-12
Attending: COLON & RECTAL SURGERY | Admitting: COLON & RECTAL SURGERY
Payer: COMMERCIAL

## 2020-02-12 VITALS
SYSTOLIC BLOOD PRESSURE: 141 MMHG | DIASTOLIC BLOOD PRESSURE: 81 MMHG | TEMPERATURE: 97 F | BODY MASS INDEX: 25.06 KG/M2 | HEART RATE: 52 BPM | WEIGHT: 185 LBS | HEIGHT: 72 IN | OXYGEN SATURATION: 100 % | RESPIRATION RATE: 16 BRPM

## 2020-02-12 DIAGNOSIS — Z12.11 SCREENING FOR MALIGNANT NEOPLASM OF COLON: Primary | ICD-10-CM

## 2020-02-12 PROCEDURE — E9220 PRA ENDO ANESTHESIA: HCPCS | Mod: 33,,, | Performed by: NURSE ANESTHETIST, CERTIFIED REGISTERED

## 2020-02-12 PROCEDURE — 27201012 HC FORCEPS, HOT/COLD, DISP: Performed by: COLON & RECTAL SURGERY

## 2020-02-12 PROCEDURE — E9220 PRA ENDO ANESTHESIA: ICD-10-PCS | Mod: 33,,, | Performed by: NURSE ANESTHETIST, CERTIFIED REGISTERED

## 2020-02-12 PROCEDURE — 45380 COLONOSCOPY AND BIOPSY: CPT | Mod: 59,,, | Performed by: COLON & RECTAL SURGERY

## 2020-02-12 PROCEDURE — 63600175 PHARM REV CODE 636 W HCPCS: Performed by: NURSE ANESTHETIST, CERTIFIED REGISTERED

## 2020-02-12 PROCEDURE — 45380 PR COLONOSCOPY,BIOPSY: ICD-10-PCS | Mod: 59,,, | Performed by: COLON & RECTAL SURGERY

## 2020-02-12 PROCEDURE — 37000008 HC ANESTHESIA 1ST 15 MINUTES: Performed by: COLON & RECTAL SURGERY

## 2020-02-12 PROCEDURE — 63600175 PHARM REV CODE 636 W HCPCS: Performed by: COLON & RECTAL SURGERY

## 2020-02-12 PROCEDURE — 45380 COLONOSCOPY AND BIOPSY: CPT | Performed by: COLON & RECTAL SURGERY

## 2020-02-12 PROCEDURE — 88305 TISSUE EXAM BY PATHOLOGIST: CPT | Mod: 59 | Performed by: PATHOLOGY

## 2020-02-12 PROCEDURE — 37000009 HC ANESTHESIA EA ADD 15 MINS: Performed by: COLON & RECTAL SURGERY

## 2020-02-12 PROCEDURE — 45385 COLONOSCOPY W/LESION REMOVAL: CPT | Performed by: COLON & RECTAL SURGERY

## 2020-02-12 PROCEDURE — 45385 PR COLONOSCOPY,REMV LESN,SNARE: ICD-10-PCS | Mod: 33,,, | Performed by: COLON & RECTAL SURGERY

## 2020-02-12 PROCEDURE — 88305 TISSUE EXAM BY PATHOLOGIST: CPT | Mod: 26,,, | Performed by: PATHOLOGY

## 2020-02-12 PROCEDURE — 45385 COLONOSCOPY W/LESION REMOVAL: CPT | Mod: 33,,, | Performed by: COLON & RECTAL SURGERY

## 2020-02-12 PROCEDURE — 27201089 HC SNARE, DISP (ANY): Performed by: COLON & RECTAL SURGERY

## 2020-02-12 PROCEDURE — 88305 TISSUE EXAM BY PATHOLOGIST: ICD-10-PCS | Mod: 26,,, | Performed by: PATHOLOGY

## 2020-02-12 RX ORDER — SODIUM CHLORIDE 9 MG/ML
INJECTION, SOLUTION INTRAVENOUS CONTINUOUS
Status: DISCONTINUED | OUTPATIENT
Start: 2020-02-12 | End: 2020-02-12 | Stop reason: HOSPADM

## 2020-02-12 RX ORDER — PROPOFOL 10 MG/ML
VIAL (ML) INTRAVENOUS
Status: DISCONTINUED | OUTPATIENT
Start: 2020-02-12 | End: 2020-02-12

## 2020-02-12 RX ORDER — LIDOCAINE HCL/PF 100 MG/5ML
SYRINGE (ML) INTRAVENOUS
Status: DISCONTINUED | OUTPATIENT
Start: 2020-02-12 | End: 2020-02-12

## 2020-02-12 RX ORDER — PROPOFOL 10 MG/ML
VIAL (ML) INTRAVENOUS CONTINUOUS PRN
Status: DISCONTINUED | OUTPATIENT
Start: 2020-02-12 | End: 2020-02-12

## 2020-02-12 RX ADMIN — SODIUM CHLORIDE: 0.9 INJECTION, SOLUTION INTRAVENOUS at 09:02

## 2020-02-12 RX ADMIN — PROPOFOL 80 MG: 10 INJECTION, EMULSION INTRAVENOUS at 09:02

## 2020-02-12 RX ADMIN — Medication 100 MG: at 09:02

## 2020-02-12 RX ADMIN — PROPOFOL 200 MCG/KG/MIN: 10 INJECTION, EMULSION INTRAVENOUS at 09:02

## 2020-02-12 NOTE — DISCHARGE INSTRUCTIONS
Colonoscopy     A camera attached to a flexible tube with a viewing lens is used to take video pictures.     Colonoscopy is a test to view the inside of your lower digestive tract (colon and rectum). Sometimes it can show the last part of the small intestine (ileum). During the test, small pieces of tissue may be removed for testing. This is called a biopsy. Small growths, such as polyps, may also be removed.   Why is colonoscopy done?  The test is done to help look for colon cancer. And it can help find the source of abdominal pain, bleeding, and changes in bowel habits. It may be needed once a year, depending on factors such as your:  · Age  · Health history  · Family health history  · Symptoms  · Results from any prior colonoscopy  Risks and possible complications  These include:  · Bleeding               · A puncture or tear in the colon   · Risks of anesthesia  · A cancer lesion not being seen  Getting ready   To prepare for the test:  · Talk with your healthcare provider about the risks of the test (see below). Also ask your healthcare provider about alternatives to the test.  · Tell your healthcare provider about any medicines you take. Also tell him or her about any health conditions you may have.  · Make sure your rectum and colon are empty for the test. Follow the diet and bowel prep instructions exactly. If you dont, the test may need to be rescheduled.  · Plan for a friend or family member to drive you home after the test.     Colonoscopy provides an inside view of the entire colon.     You may discuss the results with your doctor right away or at a future visit.  During the test   The test is usually done in the hospital on an outpatient basis. This means you go home the same day. The procedure takes about 30 minutes. During that time:  · You are given relaxing (sedating) medicine through an IV line. You may be drowsy, or fully asleep.  · The healthcare provider will first give you a physical exam to  check for anal and rectal problems.  · Then the anus is lubricated and the scope inserted.  · If you are awake, you may have a feeling similar to needing to have a bowel movement. You may also feel pressure as air is pumped into the colon. Its OK to pass gas during the procedure.  · Biopsy, polyp removal, or other treatments may be done during the test.  After the test   You may have gas right after the test. It can help to try to pass it to help prevent later bloating. Your healthcare provider may discuss the results with you right away. Or you may need to schedule a follow-up visit to talk about the results. After the test, you can go back to your normal eating and other activities. You may be tired from the sedation and need to rest for a few hours.  Date Last Reviewed: 11/1/2016 © 2000-2017 [employer]. [address] [place] [address], [place] [address]. All rights reserved. This information is not intended as a substitute for professional medical care. Always follow your healthcare professional's instructions.

## 2020-02-12 NOTE — [employer] PATIENT INSTRUCTIONS
Discharge Summary/Instructions after an Endoscopic Procedure  Patient Name: Dandy Gale  Patient MRN: [id number]  Patient YOB: 1960 Wednesday, February 12, 2020  Paulette George MD  RESTRICTIONS:  During your procedure today, you received medications for sedation.  These   medications may affect your judgment, balance and coordination.  Therefore,   for 24 hours, you have the following restrictions:   - DO NOT drive a car, operate machinery, make legal/financial decisions,   sign important papers or drink alcohol.    ACTIVITY:  Today: no heavy lifting, straining or running due to procedural   sedation/anesthesia.  The following day: return to full activity including work.  DIET:  Eat and drink normally unless instructed otherwise.     TREATMENT FOR COMMON SIDE EFFECTS:  - Mild abdominal pain, nausea, belching, bloating or excessive gas:  rest,   eat lightly and use a heating pad.  - Sore Throat: treat with throat lozenges and/or gargle with warm salt   water.  - Because air was used during the procedure, expelling large amounts of air   from your rectum or belching is normal.  - If a bowel prep was taken, you may not have a bowel movement for 1-3 days.    This is normal.  SYMPTOMS TO WATCH FOR AND REPORT TO YOUR PHYSICIAN:  1. Abdominal pain or bloating, other than gas cramps.  2. Chest pain.  3. Back pain.  4. Signs of infection such as: chills or fever occurring within 24 hours   after the procedure.  5. Rectal bleeding, which would show as bright red, maroon, or black stools.   (A tablespoon of blood from the rectum is not serious, especially if   hemorrhoids are present.)  6. Vomiting.  7. Weakness or dizziness.  GO DIRECTLY TO THE NEAREST EMERGENCY ROOM IF YOU HAVE ANY OF THE FOLLOWING:      Difficulty breathing              Chills and/or fever over 101 F   Persistent vomiting and/or vomiting blood   Severe abdominal pain   Severe chest pain   Black, tarry stools   Bleeding- more than one  tablespoon   Any other symptom or condition that you feel may need urgent attention  Your doctor recommends these additional instructions:  If any biopsies were taken, your doctors clinic will contact you in 1 to 2   weeks with any results.  - Discharge patient to home.   - Resume previous diet.   - Continue present medications.   - Await pathology results.   - Repeat colonoscopy date to be determined after pending pathology results   are reviewed for surveillance.   - Return to referring physician.   - Written discharge instructions were provided to the patient.   - The signs and symptoms of potential delayed complications were discussed   with the patient.   - Patient has a contact number available for emergencies.   - Return to normal activities tomorrow.  For questions, problems or results please call your physician - Paulette George MD at Work:  [phone].  [facility], EMERGENCY ROOM PHONE NUMBER: [phone]  IF A COMPLICATION OR EMERGENCY SITUATION ARISES AND YOU ARE UNABLE TO REACH   YOUR PHYSICIAN - GO DIRECTLY TO THE EMERGENCY ROOM.  Paulette George MD  2/12/2020 10:25:36 AM  This report has been verified and signed electronically.  [employer]

## 2020-02-12 NOTE — ANESTHESIA POSTPROCEDURE EVALUATION
Anesthesia Post Evaluation    Patient: Dandy Gale    Procedure(s) Performed: Procedure(s) (LRB):  COLONOSCOPY (N/A)    Final Anesthesia Type: general    Patient location during evaluation: PACU  Patient participation: Yes- Able to Participate  Level of consciousness: awake and alert  Post-procedure vital signs: reviewed and stable  Pain management: adequate  Airway patency: patent    PONV status at discharge: No PONV  Anesthetic complications: no      Cardiovascular status: hemodynamically stable  Respiratory status: unassisted  Hydration status: euvolemic  Follow-up not needed.          Vitals Value Taken Time   /81 2/12/2020 11:00 AM   Temp 36.3 °C (97.3 °F) 2/12/2020 10:30 AM   Pulse 52 2/12/2020 11:00 AM   Resp 16 2/12/2020 11:00 AM   SpO2 100 % 2/12/2020 11:00 AM         Event Time     Out of Recovery 11:01:19          Pain/Fannie Score: Fannie Score: 10 (2/12/2020 10:45 AM)

## 2020-02-12 NOTE — TRANSFER OF CARE
Anesthesia Transfer of Care Note    Patient: Dandy Gale    Procedure(s) Performed: Procedure(s) (LRB):  COLONOSCOPY (N/A)    Patient location: GI    Anesthesia Type: general    Transport from OR: Transported from OR on room air with adequate spontaneous ventilation    Post pain: adequate analgesia    Post assessment: no apparent anesthetic complications    Post vital signs: stable    Level of consciousness: awake    Nausea/Vomiting: no nausea/vomiting    Complications: none    Transfer of care protocol was followed      Last vitals:   Visit Vitals  /80 (BP Location: Left arm, Patient Position: Lying)   Pulse 62   Temp 36.4 °C (97.5 °F) (Temporal)   Resp 16   Ht 6' (1.829 m)   Wt 83.9 kg (185 lb)   SpO2 98%   BMI 25.09 kg/m²

## 2020-02-12 NOTE — H&P
COLONOSCOPY HISTORY AND PHYSICAL EXAM    Procedure : Colonoscopy      INDICATIONS: asymptomatic screening exam    Family Hx of CRC: Grandmother (70s)    Last Colonoscopy:  None  Findings: n/a       Past Medical History:   Diagnosis Date    High blood cholesterol     Squamous cell carcinoma 2/2016    LEFT MID FOREARM:     Sedation Problems: NO  Family History   Problem Relation Age of Onset    Prostate cancer Father     Hypertension Mother     Cancer Paternal Grandmother     Heart disease Maternal Grandmother     Melanoma Neg Hx      Fam Hx of Sedation Problems: NO  Social History     Socioeconomic History    Marital status:      Spouse name: Not on file    Number of children: Not on file    Years of education: Not on file    Highest education level: Not on file   Occupational History    Not on file   Social Needs    Financial resource strain: Not on file    Food insecurity:     Worry: Not on file     Inability: Not on file    Transportation needs:     Medical: Not on file     Non-medical: Not on file   Tobacco Use    Smoking status: Former Smoker     Packs/day: 1.00     Years: 10.00     Pack years: 10.00    Smokeless tobacco: Former User     Quit date: 2/1/1990   Substance and Sexual Activity    Alcohol use: Yes     Alcohol/week: 2.0 standard drinks     Types: 2 Glasses of wine per week    Drug use: Never    Sexual activity: Yes     Partners: Female   Lifestyle    Physical activity:     Days per week: Not on file     Minutes per session: Not on file    Stress: Not on file   Relationships    Social connections:     Talks on phone: Not on file     Gets together: Not on file     Attends Mandaen service: Not on file     Active member of club or organization: Not on file     Attends meetings of clubs or organizations: Not on file     Relationship status: Not on file   Other Topics Concern    Not on file   Social History Narrative    Not on file       Review of Systems - Negative except    Respiratory ROS: no dyspnea  Cardiovascular ROS: no exertional chest pain  Gastrointestinal ROS: NO abdominal discomfort,  NO rectal bleeding  Musculoskeletal ROS: no muscular pain  Neurological ROS: no recent stroke    Physical Exam:  /80 (BP Location: Left arm, Patient Position: Lying)   Pulse 62   Temp 97.5 °F (36.4 °C) (Temporal)   Resp 16   Ht 6' (1.829 m)   Wt 83.9 kg (185 lb)   SpO2 98%   BMI 25.09 kg/m²   General: no distress  Head: normocephalic  Mallampati Score   Neck: supple, symmetrical, trachea midline  Lungs:  clear to auscultation bilaterally and normal respiratory effort  Heart: regular rate and rhythm and no murmur  Abdomen: soft, non-tender non-distented; bowel sounds normal; no masses,  no organomegaly  Extremities: no cyanosis or edema, or clubbing    ASA:  II    PLAN  COLONOSCOPY.    SedationPlan :MAC    The details of the procedure, the possible need for biopsy or polypectomy and the potential risks including bleeding, perforation, missed polyps were discussed in detail.

## 2020-02-12 NOTE — ANESTHESIA PREPROCEDURE EVALUATION
02/12/2020  Dandy Gale is a 59 y.o., male.    Anesthesia Evaluation    I have reviewed the Patient Summary Reports.    I have reviewed the Nursing Notes.   I have reviewed the Medications.     Review of Systems  Anesthesia Hx:  No problems with previous Anesthesia    Social:  Former Smoker, Social Alcohol Use    Hematology/Oncology:  Hematology Normal      Oncology Comments: Skin cancer    EENT/Dental:EENT/Dental Normal   Cardiovascular:   Exercise tolerance: good    Renal/:   BPH    Hepatic/GI:  Hepatic/GI Normal    Musculoskeletal:   Humerus fracture   Neurological:  Neurology Normal    Endocrine:  Endocrine Normal    Dermatological:   Squamous cell cancer   Psych:  Psychiatric Normal           Physical Exam  General:  Well nourished                 Anesthesia Plan  Type of Anesthesia, risks & benefits discussed:  Anesthesia Type:  general  Patient's Preference:   Intra-op Monitoring Plan:   Intra-op Monitoring Plan Comments:   Post Op Pain Control Plan:   Post Op Pain Control Plan Comments:   Induction:   IV  Beta Blocker:  Patient is not currently on a Beta-Blocker (No further documentation required).       Informed Consent: Patient understands risks and agrees with Anesthesia plan.  Questions answered. Anesthesia consent signed with patient.  ASA Score: 2     Day of Surgery Review of History & Physical:    H&P update referred to the provider.         Ready For Surgery From Anesthesia Perspective.

## 2020-02-14 ENCOUNTER — TELEPHONE (OUTPATIENT)
Dept: SLEEP MEDICINE | Facility: OTHER | Age: 60
End: 2020-02-14

## 2020-02-18 LAB
FINAL PATHOLOGIC DIAGNOSIS: NORMAL
GROSS: NORMAL

## 2020-02-20 ENCOUNTER — TELEPHONE (OUTPATIENT)
Dept: SLEEP MEDICINE | Facility: OTHER | Age: 60
End: 2020-02-20

## 2020-02-21 ENCOUNTER — HOSPITAL ENCOUNTER (OUTPATIENT)
Dept: SLEEP MEDICINE | Facility: OTHER | Age: 60
Discharge: HOME | End: 2020-02-21
Attending: INTERNAL MEDICINE
Payer: COMMERCIAL

## 2020-02-21 DIAGNOSIS — G47.01 INSOMNIA DUE TO MEDICAL CONDITION: ICD-10-CM

## 2020-02-21 DIAGNOSIS — R53.82 CHRONIC FATIGUE: ICD-10-CM

## 2020-02-21 DIAGNOSIS — R06.83 SNORING: ICD-10-CM

## 2020-02-21 PROCEDURE — 95800 SLP STDY UNATTENDED: CPT

## 2020-02-26 PROCEDURE — 95800 PR SLEEP STUDY, UNATTENDED, RECORD HEART RATE/O2 SAT/RESP ANAL/SLEEP TIME: ICD-10-PCS | Mod: 26,,, | Performed by: INTERNAL MEDICINE

## 2020-02-26 PROCEDURE — 95800 SLP STDY UNATTENDED: CPT | Mod: 26,,, | Performed by: INTERNAL MEDICINE

## 2020-03-07 ENCOUNTER — PATIENT MESSAGE (OUTPATIENT)
Dept: SLEEP MEDICINE | Facility: CLINIC | Age: 60
End: 2020-03-07

## 2020-03-07 DIAGNOSIS — G47.33 OSA (OBSTRUCTIVE SLEEP APNEA): Primary | ICD-10-CM

## 2020-03-09 NOTE — PROCEDURES
"The sleep study that you ordered is complete.  You have ordered sleep LAB services to perform the sleep study for Dandy Gale     Please find Sleep Study result in  the "Media tab" of Chart Review menu.     Patient is already established with a Sleep Medicine provider        For any questions, please contact our clinic staff at [phone] to talk to clinical staff.     "

## 2021-01-05 ENCOUNTER — PATIENT MESSAGE (OUTPATIENT)
Dept: ADMINISTRATIVE | Facility: OTHER | Age: 61
End: 2021-01-05

## 2021-03-13 ENCOUNTER — IMMUNIZATION (OUTPATIENT)
Dept: PRIMARY CARE CLINIC | Facility: CLINIC | Age: 61
End: 2021-03-13
Payer: COMMERCIAL

## 2021-03-13 DIAGNOSIS — Z23 NEED FOR VACCINATION: Primary | ICD-10-CM

## 2021-03-13 PROCEDURE — 91300 PR SARS-COV- 2 COVID-19 VACCINE, NO PRSV, 30MCG/0.3ML, IM: ICD-10-PCS | Mod: S$GLB,,, | Performed by: INTERNAL MEDICINE

## 2021-03-13 PROCEDURE — 0001A PR IMMUNIZ ADMIN, SARS-COV-2 COVID-19 VACC, 30MCG/0.3ML, 1ST DOSE: CPT | Mod: CV19,S$GLB,, | Performed by: INTERNAL MEDICINE

## 2021-03-13 PROCEDURE — 91300 PR SARS-COV- 2 COVID-19 VACCINE, NO PRSV, 30MCG/0.3ML, IM: CPT | Mod: S$GLB,,, | Performed by: INTERNAL MEDICINE

## 2021-03-13 PROCEDURE — 0001A PR IMMUNIZ ADMIN, SARS-COV-2 COVID-19 VACC, 30MCG/0.3ML, 1ST DOSE: ICD-10-PCS | Mod: CV19,S$GLB,, | Performed by: INTERNAL MEDICINE

## 2021-03-13 RX ADMIN — Medication 0.3 ML: at 01:03

## 2021-04-03 ENCOUNTER — IMMUNIZATION (OUTPATIENT)
Dept: PRIMARY CARE CLINIC | Facility: CLINIC | Age: 61
End: 2021-04-03
Payer: COMMERCIAL

## 2021-04-03 DIAGNOSIS — Z23 NEED FOR VACCINATION: Primary | ICD-10-CM

## 2021-04-03 PROCEDURE — 0002A PR IMMUNIZ ADMIN, SARS-COV-2 COVID-19 VACC, 30MCG/0.3ML, 2ND DOSE: ICD-10-PCS | Mod: CV19,S$GLB,, | Performed by: INTERNAL MEDICINE

## 2021-04-03 PROCEDURE — 91300 PR SARS-COV- 2 COVID-19 VACCINE, NO PRSV, 30MCG/0.3ML, IM: CPT | Mod: S$GLB,,, | Performed by: INTERNAL MEDICINE

## 2021-04-03 PROCEDURE — 0002A PR IMMUNIZ ADMIN, SARS-COV-2 COVID-19 VACC, 30MCG/0.3ML, 2ND DOSE: CPT | Mod: CV19,S$GLB,, | Performed by: INTERNAL MEDICINE

## 2021-04-03 PROCEDURE — 91300 PR SARS-COV- 2 COVID-19 VACCINE, NO PRSV, 30MCG/0.3ML, IM: ICD-10-PCS | Mod: S$GLB,,, | Performed by: INTERNAL MEDICINE

## 2021-04-03 RX ADMIN — Medication 0.3 ML: at 09:04

## 2021-04-06 ENCOUNTER — PATIENT MESSAGE (OUTPATIENT)
Dept: ADMINISTRATIVE | Facility: HOSPITAL | Age: 61
End: 2021-04-06

## 2021-07-07 ENCOUNTER — PATIENT MESSAGE (OUTPATIENT)
Dept: ADMINISTRATIVE | Facility: HOSPITAL | Age: 61
End: 2021-07-07

## 2021-09-15 ENCOUNTER — LAB VISIT (OUTPATIENT)
Dept: PRIMARY CARE CLINIC | Facility: OTHER | Age: 61
End: 2021-09-15
Attending: INTERNAL MEDICINE
Payer: COMMERCIAL

## 2021-09-15 DIAGNOSIS — Z20.822 ENCOUNTER FOR LABORATORY TESTING FOR COVID-19 VIRUS: ICD-10-CM

## 2021-09-15 LAB
SARS-COV-2 RNA RESP QL NAA+PROBE: NOT DETECTED
SARS-COV-2- CYCLE NUMBER: NORMAL

## 2021-09-15 PROCEDURE — U0003 INFECTIOUS AGENT DETECTION BY NUCLEIC ACID (DNA OR RNA); SEVERE ACUTE RESPIRATORY SYNDROME CORONAVIRUS 2 (SARS-COV-2) (CORONAVIRUS DISEASE [COVID-19]), AMPLIFIED PROBE TECHNIQUE, MAKING USE OF HIGH THROUGHPUT TECHNOLOGIES AS DESCRIBED BY CMS-2020-01-R: HCPCS | Performed by: INTERNAL MEDICINE

## 2021-12-15 ENCOUNTER — IMMUNIZATION (OUTPATIENT)
Dept: INTERNAL MEDICINE | Facility: CLINIC | Age: 61
End: 2021-12-15
Payer: COMMERCIAL

## 2021-12-15 DIAGNOSIS — Z23 NEED FOR VACCINATION: Primary | ICD-10-CM

## 2021-12-15 PROCEDURE — 0004A COVID-19, MRNA, LNP-S, PF, 30 MCG/0.3 ML DOSE VACCINE: CPT | Mod: PBBFAC | Performed by: INTERNAL MEDICINE

## 2022-02-14 ENCOUNTER — HOSPITAL ENCOUNTER (OUTPATIENT)
Dept: RADIOLOGY | Facility: OTHER | Age: 62
Discharge: HOME | End: 2022-02-14
Attending: STUDENT IN AN ORGANIZED HEALTH CARE EDUCATION/TRAINING PROGRAM
Payer: COMMERCIAL

## 2022-02-14 ENCOUNTER — OFFICE VISIT (OUTPATIENT)
Dept: INTERNAL MEDICINE | Facility: CLINIC | Age: 62
End: 2022-02-14
Payer: COMMERCIAL

## 2022-02-14 ENCOUNTER — LAB VISIT (OUTPATIENT)
Dept: LAB | Facility: OTHER | Age: 62
End: 2022-02-14
Attending: STUDENT IN AN ORGANIZED HEALTH CARE EDUCATION/TRAINING PROGRAM
Payer: COMMERCIAL

## 2022-02-14 VITALS
HEIGHT: 72 IN | HEART RATE: 53 BPM | DIASTOLIC BLOOD PRESSURE: 72 MMHG | WEIGHT: 179.44 LBS | SYSTOLIC BLOOD PRESSURE: 120 MMHG | BODY MASS INDEX: 24.3 KG/M2 | OXYGEN SATURATION: 99 %

## 2022-02-14 DIAGNOSIS — E78.49 OTHER HYPERLIPIDEMIA: ICD-10-CM

## 2022-02-14 DIAGNOSIS — N40.1 BENIGN PROSTATIC HYPERPLASIA WITH URINARY OBSTRUCTION: ICD-10-CM

## 2022-02-14 DIAGNOSIS — G47.33 OSA (OBSTRUCTIVE SLEEP APNEA): ICD-10-CM

## 2022-02-14 DIAGNOSIS — Z87.891 FORMER SMOKER: ICD-10-CM

## 2022-02-14 DIAGNOSIS — Z00.00 ANNUAL PHYSICAL EXAM: ICD-10-CM

## 2022-02-14 DIAGNOSIS — N13.8 BENIGN PROSTATIC HYPERPLASIA WITH URINARY OBSTRUCTION: ICD-10-CM

## 2022-02-14 DIAGNOSIS — Z00.00 ANNUAL PHYSICAL EXAM: Primary | ICD-10-CM

## 2022-02-14 DIAGNOSIS — Z80.42 FAMILY HISTORY OF PROSTATE CANCER IN FATHER: ICD-10-CM

## 2022-02-14 DIAGNOSIS — R97.20 ELEVATED PSA: ICD-10-CM

## 2022-02-14 DIAGNOSIS — R73.09 ELEVATED GLUCOSE: ICD-10-CM

## 2022-02-14 LAB
ALBUMIN SERPL BCP-MCNC: 4.2 G/DL (ref 3.5–5.2)
ALP SERPL-CCNC: 83 U/L (ref 55–135)
ALT SERPL W/O P-5'-P-CCNC: 14 U/L (ref 10–44)
ANION GAP SERPL CALC-SCNC: 9 MMOL/L (ref 8–16)
AST SERPL-CCNC: 20 U/L (ref 10–40)
BASOPHILS # BLD AUTO: 0.08 K/UL (ref 0–0.2)
BASOPHILS NFR BLD: 1.5 % (ref 0–1.9)
BILIRUB SERPL-MCNC: 0.7 MG/DL (ref 0.1–1)
BUN SERPL-MCNC: 9 MG/DL (ref 8–23)
CALCIUM SERPL-MCNC: 10.3 MG/DL (ref 8.7–10.5)
CHLORIDE SERPL-SCNC: 102 MMOL/L (ref 95–110)
CO2 SERPL-SCNC: 31 MMOL/L (ref 23–29)
CREAT SERPL-MCNC: 0.9 MG/DL (ref 0.5–1.4)
DIFFERENTIAL METHOD: ABNORMAL
EOSINOPHIL # BLD AUTO: 0.2 K/UL (ref 0–0.5)
EOSINOPHIL NFR BLD: 4.2 % (ref 0–8)
ERYTHROCYTE [DISTWIDTH] IN BLOOD BY AUTOMATED COUNT: 12.5 % (ref 11.5–14.5)
EST. GFR  (AFRICAN AMERICAN): >60 ML/MIN/1.73 M^2
EST. GFR  (NON AFRICAN AMERICAN): >60 ML/MIN/1.73 M^2
GLUCOSE SERPL-MCNC: 86 MG/DL (ref 70–110)
HCT VFR BLD AUTO: 45.6 % (ref 40–54)
HGB BLD-MCNC: 15.7 G/DL (ref 14–18)
IMM GRANULOCYTES # BLD AUTO: 0.01 K/UL (ref 0–0.04)
IMM GRANULOCYTES NFR BLD AUTO: 0.2 % (ref 0–0.5)
LYMPHOCYTES # BLD AUTO: 1.8 K/UL (ref 1–4.8)
LYMPHOCYTES NFR BLD: 34.9 % (ref 18–48)
MCH RBC QN AUTO: 32 PG (ref 27–31)
MCHC RBC AUTO-ENTMCNC: 34.4 G/DL (ref 32–36)
MCV RBC AUTO: 93 FL (ref 82–98)
MONOCYTES # BLD AUTO: 0.5 K/UL (ref 0.3–1)
MONOCYTES NFR BLD: 9.5 % (ref 4–15)
NEUTROPHILS # BLD AUTO: 2.6 K/UL (ref 1.8–7.7)
NEUTROPHILS NFR BLD: 49.7 % (ref 38–73)
NRBC BLD-RTO: 0 /100 WBC
PLATELET # BLD AUTO: 285 K/UL (ref 150–450)
PMV BLD AUTO: 11.2 FL (ref 9.2–12.9)
POTASSIUM SERPL-SCNC: 4 MMOL/L (ref 3.5–5.1)
PROT SERPL-MCNC: 7 G/DL (ref 6–8.4)
RBC # BLD AUTO: 4.91 M/UL (ref 4.6–6.2)
SODIUM SERPL-SCNC: 142 MMOL/L (ref 136–145)
TSH SERPL DL<=0.005 MIU/L-ACNC: 1.28 UIU/ML (ref 0.4–4)
WBC # BLD AUTO: 5.25 K/UL (ref 3.9–12.7)

## 2022-02-14 PROCEDURE — 80061 LIPID PANEL: CPT | Performed by: STUDENT IN AN ORGANIZED HEALTH CARE EDUCATION/TRAINING PROGRAM

## 2022-02-14 PROCEDURE — 3074F SYST BP LT 130 MM HG: CPT | Mod: CPTII,S$GLB,, | Performed by: STUDENT IN AN ORGANIZED HEALTH CARE EDUCATION/TRAINING PROGRAM

## 2022-02-14 PROCEDURE — 84443 ASSAY THYROID STIM HORMONE: CPT | Performed by: STUDENT IN AN ORGANIZED HEALTH CARE EDUCATION/TRAINING PROGRAM

## 2022-02-14 PROCEDURE — 36415 COLL VENOUS BLD VENIPUNCTURE: CPT | Performed by: STUDENT IN AN ORGANIZED HEALTH CARE EDUCATION/TRAINING PROGRAM

## 2022-02-14 PROCEDURE — 99396 PREV VISIT EST AGE 40-64: CPT | Mod: S$GLB,,, | Performed by: STUDENT IN AN ORGANIZED HEALTH CARE EDUCATION/TRAINING PROGRAM

## 2022-02-14 PROCEDURE — 99999 PR PBB SHADOW E&M-EST. PATIENT-LVL III: CPT | Mod: PBBFAC,,, | Performed by: STUDENT IN AN ORGANIZED HEALTH CARE EDUCATION/TRAINING PROGRAM

## 2022-02-14 PROCEDURE — 99999 PR PBB SHADOW E&M-EST. PATIENT-LVL III: ICD-10-PCS | Mod: PBBFAC,,, | Performed by: STUDENT IN AN ORGANIZED HEALTH CARE EDUCATION/TRAINING PROGRAM

## 2022-02-14 PROCEDURE — 76775 US ABDOMINAL AORTA: ICD-10-PCS | Mod: 26,,, | Performed by: RADIOLOGY

## 2022-02-14 PROCEDURE — 83036 HEMOGLOBIN GLYCOSYLATED A1C: CPT | Performed by: STUDENT IN AN ORGANIZED HEALTH CARE EDUCATION/TRAINING PROGRAM

## 2022-02-14 PROCEDURE — 84153 ASSAY OF PSA TOTAL: CPT | Performed by: STUDENT IN AN ORGANIZED HEALTH CARE EDUCATION/TRAINING PROGRAM

## 2022-02-14 PROCEDURE — 99396 PR PREVENTIVE VISIT,EST,40-64: ICD-10-PCS | Mod: S$GLB,,, | Performed by: STUDENT IN AN ORGANIZED HEALTH CARE EDUCATION/TRAINING PROGRAM

## 2022-02-14 PROCEDURE — 76775 US EXAM ABDO BACK WALL LIM: CPT | Mod: TC

## 2022-02-14 PROCEDURE — 80053 COMPREHEN METABOLIC PANEL: CPT | Performed by: STUDENT IN AN ORGANIZED HEALTH CARE EDUCATION/TRAINING PROGRAM

## 2022-02-14 PROCEDURE — 85025 COMPLETE CBC W/AUTO DIFF WBC: CPT | Performed by: STUDENT IN AN ORGANIZED HEALTH CARE EDUCATION/TRAINING PROGRAM

## 2022-02-14 PROCEDURE — 3008F BODY MASS INDEX DOCD: CPT | Mod: CPTII,S$GLB,, | Performed by: STUDENT IN AN ORGANIZED HEALTH CARE EDUCATION/TRAINING PROGRAM

## 2022-02-14 PROCEDURE — 3008F PR BODY MASS INDEX (BMI) DOCUMENTED: ICD-10-PCS | Mod: CPTII,S$GLB,, | Performed by: STUDENT IN AN ORGANIZED HEALTH CARE EDUCATION/TRAINING PROGRAM

## 2022-02-14 PROCEDURE — 76775 US EXAM ABDO BACK WALL LIM: CPT | Mod: 26,,, | Performed by: RADIOLOGY

## 2022-02-14 PROCEDURE — 1159F PR MEDICATION LIST DOCUMENTED IN MEDICAL RECORD: ICD-10-PCS | Mod: CPTII,S$GLB,, | Performed by: STUDENT IN AN ORGANIZED HEALTH CARE EDUCATION/TRAINING PROGRAM

## 2022-02-14 PROCEDURE — 3074F PR MOST RECENT SYSTOLIC BLOOD PRESSURE < 130 MM HG: ICD-10-PCS | Mod: CPTII,S$GLB,, | Performed by: STUDENT IN AN ORGANIZED HEALTH CARE EDUCATION/TRAINING PROGRAM

## 2022-02-14 PROCEDURE — 3078F PR MOST RECENT DIASTOLIC BLOOD PRESSURE < 80 MM HG: ICD-10-PCS | Mod: CPTII,S$GLB,, | Performed by: STUDENT IN AN ORGANIZED HEALTH CARE EDUCATION/TRAINING PROGRAM

## 2022-02-14 PROCEDURE — 3078F DIAST BP <80 MM HG: CPT | Mod: CPTII,S$GLB,, | Performed by: STUDENT IN AN ORGANIZED HEALTH CARE EDUCATION/TRAINING PROGRAM

## 2022-02-14 PROCEDURE — 1159F MED LIST DOCD IN RCRD: CPT | Mod: CPTII,S$GLB,, | Performed by: STUDENT IN AN ORGANIZED HEALTH CARE EDUCATION/TRAINING PROGRAM

## 2022-02-14 RX ORDER — PRAVASTATIN SODIUM 20 MG/1
20 TABLET ORAL DAILY
Qty: 90 TABLET | Refills: 3 | Status: SHIPPED | OUTPATIENT
Start: 2022-02-14 | End: 2023-02-07

## 2022-02-14 NOTE — PROGRESS NOTES
[facility]    Subjective:       Patient ID: Dandy Gale is a 61 y.o. male.    Chief Complaint: Establish Care      History was obtained from the patient and supplemented through chart review.  This patient has been seen by an [facility] internal medicine physician in the past 3 years but is new to me.    HPI:    Patient is a 61 y.o. male who presents to [facility].    HLD  Was taking crestor 10  Was having myalgias prior    Smoked during the 20's    Elevated PSA  5.5 7/18/2018  Was advised to give up bladder irritants    Sleep apnea  Lost weight intentionally  Wt Readings from Last 15 Encounters:   02/14/22 81.4 kg (179 lb 7.3 oz)   02/12/20 83.9 kg (185 lb)   02/05/20 87.6 kg (193 lb 2 oz)   01/07/20 87 kg (191 lb 11 oz)   11/15/18 87.2 kg (192 lb 3.9 oz)   11/01/18 84 kg (185 lb 3 oz)   07/17/18 83.9 kg (185 lb)   04/14/16 82.3 kg (181 lb 7 oz)   10/31/13 77.1 kg (170 lb)   10/05/13 82.6 kg (182 lb)   10/03/13 77.1 kg (170 lb)     Exercise with long walks  Stairs are fine    Restrictive diet for health reasons    Follows with derm  Appt upcoming    Medical History  Past Medical History:   Diagnosis Date    High blood cholesterol     Squamous cell carcinoma 2/2016    LEFT MID FOREARM:       Review of Systems   Constitutional: Negative for activity change and unexpected weight change.   HENT: Negative for hearing loss, rhinorrhea and trouble swallowing.    Eyes: Positive for visual disturbance. Negative for discharge.   Respiratory: Negative for chest tightness and wheezing.    Cardiovascular: Negative for chest pain and palpitations.   Gastrointestinal: Negative for blood in stool, constipation, diarrhea and vomiting.   Endocrine: Positive for polyuria. Negative for polydipsia.   Genitourinary: Positive for difficulty urinating and urgency. Negative for hematuria.   Musculoskeletal: Negative for arthralgias, joint swelling and neck pain.   Neurological: Negative for weakness and  headaches.   Psychiatric/Behavioral: Negative for confusion and dysphoric mood.         Surgical hx, family hx, social hx   Have been reviewed      Current Outpatient Medications:     multivitamin capsule, Take 1 capsule by mouth once daily., Disp: , Rfl:     rosuvastatin (CRESTOR) 10 MG tablet, Take 1 tablet (10 mg total) by mouth once daily. (Patient not taking: Reported on 2/14/2022), Disp: 90 tablet, Rfl: 3    Objective:        Body mass index is 24.34 kg/m².  Vitals:    02/14/22 1452   BP: 120/72   Pulse: (!) 53   SpO2: 99%   Weight: 81.4 kg (179 lb 7.3 oz)   Height: 6' (1.829 m)   PainSc: 0-No pain     Physical Exam  Vitals and nursing note reviewed.   Constitutional:       General: He is not in acute distress.     Appearance: Normal appearance. He is not ill-appearing.   HENT:      Head: Normocephalic and atraumatic.      Nose:      Comments: Wearing a mask  Eyes:      General: No scleral icterus.  Cardiovascular:      Rate and Rhythm: Normal rate and regular rhythm.      Heart sounds: Normal heart sounds.   Pulmonary:      Effort: Pulmonary effort is normal.   Abdominal:      General: There is no distension.   Musculoskeletal:         General: No deformity.      Cervical back: Normal range of motion.   Skin:     General: Skin is warm and dry.   Neurological:      Mental Status: He is alert and oriented to person, place, and time.   Psychiatric:         Behavior: Behavior normal.           Lab Results   Component Value Date    WBC 5.32 12/27/2019    HGB 16.0 12/27/2019    HCT 47.0 12/27/2019     12/27/2019    CHOL 237 (H) 12/27/2019    TRIG 297 (H) 12/27/2019    HDL 55 12/27/2019    ALT 19 12/27/2019    AST 16 12/27/2019     12/27/2019    K 4.2 12/27/2019     12/27/2019    CREATININE 0.8 12/27/2019    BUN 14 12/27/2019    CO2 28 12/27/2019    PSA 5.5 (H) 07/18/2018    INR 1.0 02/28/2014    HGBA1C 5.2 12/27/2019       The 10-year ASCVD risk score ([place] F[facility] Rad[place]., et al., 2013) is: 9.8%     Values used to calculate the score:      Age: 61 years      Sex: Male      Is Non- : No      Diabetic: No      Tobacco smoker: No      Systolic Blood Pressure: 120 mmHg      Is BP treated: No      HDL Cholesterol: 55 mg/dL      Total Cholesterol: 237 mg/dL    (Imaging have been independently reviewed)      Assessment:         1. Annual physical exam    2. Elevated PSA    3. Benign prostatic hyperplasia with urinary obstruction    4. Other hyperlipidemia    5. ALONDRA (obstructive sleep apnea)    6. Elevated glucose    7. Family history of prostate cancer in father    8. Former smoker          Plan:     Dandy was seen today for establish care.    Diagnoses and all orders for this visit:    Annual physical exam  -     Comprehensive Metabolic Panel; Future  -     Lipid Panel; Future  -     TSH; Future  -     CBC Auto Differential; Future  -     Hemoglobin A1C; Future    Elevated PSA  -     PSA, Screening; Future    Benign prostatic hyperplasia with urinary obstruction    Other hyperlipidemia  -     Lipid Panel; Future  -     TSH; Future    ALONDRA (obstructive sleep apnea)    Elevated glucose  -     Hemoglobin A1C; Future    Family history of prostate cancer in father  -     PSA, Screening; Future    Former smoker  -     US Abdominal Aorta; Future        Health Maintenance  - Lipids: ordered (nonfasting)  - A1C: ordered  - Colon Ca Screen: repeat due 5 years, hx of polyps, due 2/2025  - Immunizations: covid vaccinated with booster    Tob  - AAA screen (ever >100 cigs in lifetime) ordered    Follow up in about 6 months (around 8/14/2022).        All medications were reviewed including potential side effects and risks/benefits.  Pt was counseled to call back if anything worsens or if questions arise.    Lawrence Denson MD  Family Medicine  [facility]  [address] [address]  Phone [phone]  Fax [phone]

## 2022-02-15 ENCOUNTER — TELEPHONE (OUTPATIENT)
Dept: INTERNAL MEDICINE | Facility: CLINIC | Age: 62
End: 2022-02-15
Payer: COMMERCIAL

## 2022-02-15 ENCOUNTER — IMMUNIZATION (OUTPATIENT)
Dept: PHARMACY | Facility: CLINIC | Age: 62
End: 2022-02-15
Payer: COMMERCIAL

## 2022-02-15 ENCOUNTER — PATIENT MESSAGE (OUTPATIENT)
Dept: INTERNAL MEDICINE | Facility: CLINIC | Age: 62
End: 2022-02-15
Payer: COMMERCIAL

## 2022-02-15 DIAGNOSIS — R97.20 ELEVATED PSA: Primary | ICD-10-CM

## 2022-02-15 LAB
CHOLEST SERPL-MCNC: 194 MG/DL (ref 120–199)
CHOLEST/HDLC SERPL: 3.5 {RATIO} (ref 2–5)
COMPLEXED PSA SERPL-MCNC: 7.9 NG/ML (ref 0–4)
ESTIMATED AVG GLUCOSE: 100 MG/DL (ref 68–131)
HBA1C MFR BLD: 5.1 % (ref 4–5.6)
HDLC SERPL-MCNC: 55 MG/DL (ref 40–75)
HDLC SERPL: 28.4 % (ref 20–50)
LDLC SERPL CALC-MCNC: 108.6 MG/DL (ref 63–159)
NONHDLC SERPL-MCNC: 139 MG/DL
TRIGL SERPL-MCNC: 152 MG/DL (ref 30–150)

## 2022-02-15 NOTE — TELEPHONE ENCOUNTER
PSA increased    Let vm for patient  Please schedule appt with urology Dr. Nunez, has been seen before    JL

## 2022-02-18 ENCOUNTER — OFFICE VISIT (OUTPATIENT)
Dept: DERMATOLOGY | Facility: CLINIC | Age: 62
End: 2022-02-18
Payer: COMMERCIAL

## 2022-02-18 DIAGNOSIS — L57.0 AK (ACTINIC KERATOSIS): ICD-10-CM

## 2022-02-18 DIAGNOSIS — D22.9 MULTIPLE BENIGN NEVI: ICD-10-CM

## 2022-02-18 DIAGNOSIS — D48.5 NEOPLASM OF UNCERTAIN BEHAVIOR OF SKIN: Primary | ICD-10-CM

## 2022-02-18 DIAGNOSIS — L81.4 LENTIGINES: ICD-10-CM

## 2022-02-18 DIAGNOSIS — L72.0 EIC (EPIDERMAL INCLUSION CYST): ICD-10-CM

## 2022-02-18 DIAGNOSIS — D18.01 CHERRY ANGIOMA: ICD-10-CM

## 2022-02-18 DIAGNOSIS — L82.1 SK (SEBORRHEIC KERATOSIS): ICD-10-CM

## 2022-02-18 DIAGNOSIS — Z12.83 SKIN CANCER SCREENING: ICD-10-CM

## 2022-02-18 PROCEDURE — 1160F PR REVIEW ALL MEDS BY PRESCRIBER/CLIN PHARMACIST DOCUMENTED: ICD-10-PCS | Mod: CPTII,S$GLB,, | Performed by: DERMATOLOGY

## 2022-02-18 PROCEDURE — 11102 TANGNTL BX SKIN SINGLE LES: CPT | Mod: S$GLB,,, | Performed by: DERMATOLOGY

## 2022-02-18 PROCEDURE — 88305 TISSUE EXAM BY PATHOLOGIST: CPT | Performed by: PATHOLOGY

## 2022-02-18 PROCEDURE — 88342 IMHCHEM/IMCYTCHM 1ST ANTB: CPT | Performed by: PATHOLOGY

## 2022-02-18 PROCEDURE — 17000 DESTRUCT PREMALG LESION: CPT | Mod: 59,S$GLB,, | Performed by: DERMATOLOGY

## 2022-02-18 PROCEDURE — 88342 CHG IMMUNOCYTOCHEMISTRY: ICD-10-PCS | Mod: 26,,, | Performed by: PATHOLOGY

## 2022-02-18 PROCEDURE — 88342 IMHCHEM/IMCYTCHM 1ST ANTB: CPT | Mod: 26,,, | Performed by: PATHOLOGY

## 2022-02-18 PROCEDURE — 1159F MED LIST DOCD IN RCRD: CPT | Mod: CPTII,S$GLB,, | Performed by: DERMATOLOGY

## 2022-02-18 PROCEDURE — 99999 PR PBB SHADOW E&M-EST. PATIENT-LVL III: CPT | Mod: PBBFAC,,, | Performed by: DERMATOLOGY

## 2022-02-18 PROCEDURE — 1159F PR MEDICATION LIST DOCUMENTED IN MEDICAL RECORD: ICD-10-PCS | Mod: CPTII,S$GLB,, | Performed by: DERMATOLOGY

## 2022-02-18 PROCEDURE — 88305 TISSUE EXAM BY PATHOLOGIST: CPT | Mod: 26,,, | Performed by: PATHOLOGY

## 2022-02-18 PROCEDURE — 11102 PR TANGENTIAL BIOPSY, SKIN, SINGLE LESION: ICD-10-PCS | Mod: S$GLB,,, | Performed by: DERMATOLOGY

## 2022-02-18 PROCEDURE — 17000 PR DESTRUCTION(LASER SURGERY,CRYOSURGERY,CHEMOSURGERY),PREMALIGNANT LESIONS,FIRST LESION: ICD-10-PCS | Mod: 59,S$GLB,, | Performed by: DERMATOLOGY

## 2022-02-18 PROCEDURE — 3044F HG A1C LEVEL LT 7.0%: CPT | Mod: CPTII,S$GLB,, | Performed by: DERMATOLOGY

## 2022-02-18 PROCEDURE — 99213 PR OFFICE/OUTPT VISIT, EST, LEVL III, 20-29 MIN: ICD-10-PCS | Mod: 25,S$GLB,, | Performed by: DERMATOLOGY

## 2022-02-18 PROCEDURE — 1160F RVW MEDS BY RX/DR IN RCRD: CPT | Mod: CPTII,S$GLB,, | Performed by: DERMATOLOGY

## 2022-02-18 PROCEDURE — 3044F PR MOST RECENT HEMOGLOBIN A1C LEVEL <7.0%: ICD-10-PCS | Mod: CPTII,S$GLB,, | Performed by: DERMATOLOGY

## 2022-02-18 PROCEDURE — 99213 OFFICE O/P EST LOW 20 MIN: CPT | Mod: 25,S$GLB,, | Performed by: DERMATOLOGY

## 2022-02-18 PROCEDURE — 88305 TISSUE EXAM BY PATHOLOGIST: ICD-10-PCS | Mod: 26,,, | Performed by: PATHOLOGY

## 2022-02-18 PROCEDURE — 99999 PR PBB SHADOW E&M-EST. PATIENT-LVL III: ICD-10-PCS | Mod: PBBFAC,,, | Performed by: DERMATOLOGY

## 2022-02-18 NOTE — PROGRESS NOTES
"  Subjective:       Patient ID:  Dandy Gale is a 61 y.o. male who presents for   Chief Complaint   Patient presents with    Skin Check     ubse     HPI  Patient is here today for a "mole" check.   Pt has a history of  moderate sun exposure in the past.   Pt recalls several blistering sunburns in the past- Yes  Pt has history of tanning bed use- No  Pt has  had moles removed in the past- Yes  Pt has history of melanoma in first degree relatives-  Not sure     Pt presents today for UBSE, h/o SCC left mid forearm excised 2/2016. Last skin check was in 2019.    Review of Systems   Constitutional: Negative.  Negative for fever and chills.   Skin: Positive for daily sunscreen use, activity-related sunscreen use and wears hat.   Hematologic/Lymphatic: Does not bruise/bleed easily.        Objective:    Physical Exam   Constitutional: He appears well-developed and well-nourished. No distress.   Neurological: He is alert and oriented to person, place, and time. He is not disoriented.   Psychiatric: He has a normal mood and affect.   Skin:   Areas Examined (abnormalities noted in diagram):   Scalp / Hair Palpated and Inspected  Head / Face Inspection Performed  Neck Inspection Performed  Chest / Axilla Inspection Performed  Abdomen Inspection Performed  Back Inspection Performed  RUE Inspected  LUE Inspection Performed                           Diagram Legend     Erythematous scaling macule/papule c/w actinic keratosis       Vascular papule c/w angioma      Pigmented verrucoid papule/plaque c/w seborrheic keratosis      Yellow umbilicated papule c/w sebaceous hyperplasia      Irregularly shaped tan macule c/w lentigo     1-2 mm smooth white papules consistent with Milia      Movable subcutaneous cyst with punctum c/w epidermal inclusion cyst      Subcutaneous movable cyst c/w pilar cyst      Firm pink to brown papule c/w dermatofibroma      Pedunculated fleshy papule(s) c/w skin tag(s)      Evenly pigmented macule c/w " junctional nevus     Mildly variegated pigmented, slightly irregular-bordered macule c/w mildly atypical nevus      Flesh colored to evenly pigmented papule c/w intradermal nevus       Pink pearly papule/plaque c/w basal cell carcinoma      Erythematous hyperkeratotic cursted plaque c/w SCC      Surgical scar with no sign of skin cancer recurrence      Open and closed comedones      Inflammatory papules and pustules      Verrucoid papule consistent consistent with wart     Erythematous eczematous patches and plaques     Dystrophic onycholytic nail with subungual debris c/w onychomycosis     Umbilicated papule    Erythematous-base heme-crusted tan verrucoid plaque consistent with inflamed seborrheic keratosis     Erythematous Silvery Scaling Plaque c/w Psoriasis     See annotation      Assessment / Plan:    Neoplasm of uncertain behavior of skin  Shave biopsy procedure note:    Shave biopsy performed after verbal consent including risk of infection, scar, recurrence, need for additional treatment of site. Area prepped with alcohol, anesthetized with approximately 1.0cc of 1% lidocaine with epinephrine. Lesional tissue shaved with razor blade. Hemostasis achieved with application of aluminum chloride followed by hyfrecation. No complications. Dressing applied. Wound care explained.    -     Specimen to Pathology, Dermatology  Pathology Orders:     Normal Orders This Visit    Specimen to Pathology, Dermatology     Comments:    Number of Specimens:->1  ------------------------->-------------------------  Spec 1 Procedure:->Biopsy  Spec 1 Clinical Impression:->r/o atypical nevus vs other  Spec 1 Source:->R upper back    Questions:    Procedure Type: Dermatology and skin neoplasms    Number of Specimens: 1    ------------------------: -------------------------    Spec 1 Procedure: Biopsy    Spec 1 Clinical Impression: r/o atypical nevus vs other    Spec 1 Source: R upper back    Release to patient:           AK (actinic  keratosis)  Cryosurgery Procedure Note    Verbal consent from the patient is obtained including, but not limited to, risk of hypopigmentation/hyperpigmentation, scar, recurrence of lesion. The patient is aware of the precancerous quality and need for treatment of these lesions. Liquid nitrogen cryosurgery is applied to the 1 actinic keratoses, as detailed in the physical exam, to produce a freeze injury. The patient is aware that blisters may form and is instructed on wound care with gentle cleansing and use of vaseline ointment to keep moist until healed. The patient is supplied a handout on cryosurgery and is instructed to call if lesions do not completely resolve.    Cherry angioma  This is a benign vascular lesion. Reassurance given. No treatment required. Treatment of benign, asymptomatic lesions may be considered cosmetic.    Multiple benign nevi  Benign-appearing nevi present on exam today. Reassurance provided. Periodically examine moles and return to clinic if any moles change or become symptomatic (bleeding, itching, pain, etc).    SK (seborrheic keratosis)  These are benign inherited growths without a malignant potential. Reassurance given to patient. No treatment is necessary.   Treatment of benign, asymptomatic lesions may be considered cosmetic.  Warned about risk of hypo- or hyperpigmentation with treatment and risk of recurrence.    Lentigines  These are benign sun spots which should be monitored for changes. Patient instructed in importance of daily broad spectrum sunscreen use with spf at least 30. Sun avoidance and topical protection/protective clothing discussed.    EIC (epidermal inclusion cyst)  This is a benign cyst of the hair follicle. Reassurance provided. No treatment is necessary unless it is symptomatic.     Skin cancer screening  Upper body skin examination performed today including at least 6 points as noted in physical examination. Suspicious lesions noted above.  Patient instructed  in importance of daily broad spectrum sunscreen use with spf at least 30. Sun avoidance and topical protection/protective clothing discussed.      Follow up in about 1 year (around 2/18/2023) for skin check or sooner pending biopsy results.

## 2022-02-18 NOTE — PATIENT INSTRUCTIONS
Shave Biopsy Wound Care    Your doctor has performed a shave biopsy today.  A band aid and vaseline ointment has been placed over the site.  This should remain in place for NO LONGER THAN 48 hours.  It is fine to remove the bandaid after 24 hours, if the area is no longer bleeding. It is recommended that you keep the area dry (do not wet)) for the first 24 hours.  After 24 hours, wash the area with warm soap and water and apply Vaseline jelly.  Many patients prefer to use Neosporin or Bacitracin ointment.  This is acceptable; however, know that you can develop an allergy to this medication even if you have used it safely for years.  It is important to keep the area moist.  Letting it dry out and get air slows healing time, and will worsen the scar.        If you notice increasing redness, tenderness, pain, or yellow drainage at the biopsy site, please notify your doctor.  These are signs of an infection.    If your biopsy site is bleeding, apply firm pressure for 15 minutes straight.  Repeat for another 15 minutes, if it is still bleeding.   If the surgical site continues to bleed, then please contact your doctor.      For [facility] users:   You will receive your biopsy results in [facility] as soon as they are available. Please be assured that your physician/provider will review your results and will then determine what further treatment, evaluation, or planning is required. You should be contacted by your physician's/provider's office within 5 business days of receiving your results; If not, please reach out to directly. This is one more way [facility] is putting you first.     [facility] [place] [address]/ [phone] FAX/ [email or web address]      Sunscreen Recommendations:    Recommend daily sun protection/avoidance and use of at least SPF 30, broad spectrum sunscreen.     Based on a recent study (6/2021) and out of an abundance of caution, we are recommending that you AVOID the  followin. Spray and gel sunscreens  2. Any [employer] or [facility] brands as well as Max Block and TopCare brands   3. Neutrogena Ultra Sheer Dry-touch Water Resistant Suncscreen LOTION SPF 70   4. Neutrogena Sheer Zinc Dry-touch Face Sunscreen LOTION SPF 50   5.   Aveeno Baby Continuous Protection Sensitive Skin Sunscreen LOTION - Broad Spectrum SPF 50    Please note that Benzene is not an ingredient or the degradation product of any ingredient in any sunscreen. This study suggested that the findings are a result of contamination in the manufacturing process. At this point, we don't know how effectively Benzene gets through the skin, if it gets absorbed systemically, and what effects that may have.     We do know that ultraviolet radiation is a well-established carcinogen. Please use daily sun protection/avoidance and use of at least SPF 30, broad-spectrum sunscreen not listed above.         XEROSIS (DRY SKIN)        1. Definition    Xerosis is the term for dry skin.  We all have a natural oil coating over our skin produced by the skin oil glands.  If this oil is removed, the skin becomes dry which can lead to cracking, which can lead to inflammation.  Xerosis is usually a long-term problem that recurs often, especially in the winter.    2. Cause     Long hot baths or showers can remove our natural oil and lead to xerosis.  One should never take more than one bath or shower a day and for no longer than ten minutes.   Use of harsh soaps such as Zest, Dial, and Ivory can worsen and cause xerosis.   Cold winter weather worsens xerosis because the amount of moisture contained in cold air is much less than the amount of moisture in warm air.    3. Treatment     Treatment is intended to restore the natural oil to your skin.  Keep the skin lubricated.     Do not take more than one bath or shower a day.  Use lukewarm water, not hot.  Hot water dries out the skin.     Use a gentle moisturizing soap such as Cetaphil  soap, Oil of Olay, Dove, Basis, Ivory moisture care, Restoraderm cleanser.     When toweling dry, dont rub.  Blot the skin so there is still some water left on the skin.  You should apply a moisturizing cream to all of the skin such as Cerave cream, Cetaphil cream, Restoraderm or Eucerin Original Formula cream.   Alpha hydroxyacid lotions, i.e., AmLactin, also work very well for preventing dry skin, but may burn when used on inflamed or reddened skin.     If you like to swim during the winter months, you should not use soap when getting out of the pool.  When you have finished swimming, rinse off the chlorine with cool to warm water.  If this will be the only shower of the day, then you may use Cetaphil or another mild soap to cleanse your skin.  After the shower, apply a moisturizing cream to all of the skin as above.        [facility] [place] [address]/ [phone] FAX/ [email or web address]

## 2022-03-02 LAB
FINAL PATHOLOGIC DIAGNOSIS: NORMAL
GROSS: NORMAL
Lab: NORMAL
MICROSCOPIC EXAM: NORMAL

## 2022-03-15 ENCOUNTER — OFFICE VISIT (OUTPATIENT)
Dept: UROLOGY | Facility: CLINIC | Age: 62
End: 2022-03-15
Payer: COMMERCIAL

## 2022-03-15 VITALS
WEIGHT: 189.38 LBS | HEART RATE: 50 BPM | DIASTOLIC BLOOD PRESSURE: 78 MMHG | SYSTOLIC BLOOD PRESSURE: 140 MMHG | HEIGHT: 72 IN | BODY MASS INDEX: 25.65 KG/M2

## 2022-03-15 DIAGNOSIS — R97.20 ELEVATED PSA: ICD-10-CM

## 2022-03-15 DIAGNOSIS — N40.1 BPH WITH URINARY OBSTRUCTION: Primary | ICD-10-CM

## 2022-03-15 DIAGNOSIS — N13.8 BPH WITH URINARY OBSTRUCTION: Primary | ICD-10-CM

## 2022-03-15 DIAGNOSIS — R33.9 INCOMPLETE BLADDER EMPTYING: ICD-10-CM

## 2022-03-15 PROCEDURE — 3008F BODY MASS INDEX DOCD: CPT | Mod: CPTII,S$GLB,, | Performed by: UROLOGY

## 2022-03-15 PROCEDURE — 1159F MED LIST DOCD IN RCRD: CPT | Mod: CPTII,S$GLB,, | Performed by: UROLOGY

## 2022-03-15 PROCEDURE — 3044F PR MOST RECENT HEMOGLOBIN A1C LEVEL <7.0%: ICD-10-PCS | Mod: CPTII,S$GLB,, | Performed by: UROLOGY

## 2022-03-15 PROCEDURE — 99204 PR OFFICE/OUTPT VISIT, NEW, LEVL IV, 45-59 MIN: ICD-10-PCS | Mod: S$GLB,,, | Performed by: UROLOGY

## 2022-03-15 PROCEDURE — 99204 OFFICE O/P NEW MOD 45 MIN: CPT | Mod: S$GLB,,, | Performed by: UROLOGY

## 2022-03-15 PROCEDURE — 3008F PR BODY MASS INDEX (BMI) DOCUMENTED: ICD-10-PCS | Mod: CPTII,S$GLB,, | Performed by: UROLOGY

## 2022-03-15 PROCEDURE — 1159F PR MEDICATION LIST DOCUMENTED IN MEDICAL RECORD: ICD-10-PCS | Mod: CPTII,S$GLB,, | Performed by: UROLOGY

## 2022-03-15 PROCEDURE — 3077F SYST BP >= 140 MM HG: CPT | Mod: CPTII,S$GLB,, | Performed by: UROLOGY

## 2022-03-15 PROCEDURE — 99999 PR PBB SHADOW E&M-EST. PATIENT-LVL III: ICD-10-PCS | Mod: PBBFAC,,, | Performed by: UROLOGY

## 2022-03-15 PROCEDURE — 99999 PR PBB SHADOW E&M-EST. PATIENT-LVL III: CPT | Mod: PBBFAC,,, | Performed by: UROLOGY

## 2022-03-15 PROCEDURE — 3077F PR MOST RECENT SYSTOLIC BLOOD PRESSURE >= 140 MM HG: ICD-10-PCS | Mod: CPTII,S$GLB,, | Performed by: UROLOGY

## 2022-03-15 PROCEDURE — 3044F HG A1C LEVEL LT 7.0%: CPT | Mod: CPTII,S$GLB,, | Performed by: UROLOGY

## 2022-03-15 PROCEDURE — 3078F PR MOST RECENT DIASTOLIC BLOOD PRESSURE < 80 MM HG: ICD-10-PCS | Mod: CPTII,S$GLB,, | Performed by: UROLOGY

## 2022-03-15 PROCEDURE — 3078F DIAST BP <80 MM HG: CPT | Mod: CPTII,S$GLB,, | Performed by: UROLOGY

## 2022-03-15 RX ORDER — TAMSULOSIN HYDROCHLORIDE 0.4 MG/1
0.4 CAPSULE ORAL NIGHTLY
Qty: 30 CAPSULE | Refills: 11 | Status: SHIPPED | OUTPATIENT
Start: 2022-03-15 | End: 2022-05-26

## 2022-03-15 NOTE — PROGRESS NOTES
Urology - [facility]  Clinic Note    SUBJECTIVE:     Chief Complaint: elevated PSA    History of Present Illness:  Dandy Gale is a 61 y.o. male who presents to clinic for elevated PSA. He is new to our clinic. Referral from Lawrence Denson MD     PSA 7.9. 2018 PSA 5.5. Denies dysuria and hematuria. Prostate biopsy by Dr. Nunez 11/15/2018 which was negative for malignancy    Family history of prostate cancer - brother 56 yo, father 60's.    He has LUTS. He has urgency, frequency 10+, incomplete voiding (he does double void), intermittency, nocturia x4 until he started a CPAP for ALONDRA. Now 1x per night.  Consumes an alcoholic beverage 3-4 nights a week which causes urgency.    He works at a  shop.        Anticoagulation:  No    OBJECTIVE:     Estimated body mass index is 25.68 kg/m² as calculated from the following:    Height as of this encounter: 6' (1.829 m).    Weight as of this encounter: 85.9 kg (189 lb 6 oz).    Vital Signs (Most Recent)  Pulse: (!) 50 (03/15/22 0834)  BP: (!) 140/78 (03/15/22 0834)    Physical Exam  Constitutional:       Appearance: Normal appearance.   HENT:      Head: Normocephalic.      Nose: Nose normal.   Cardiovascular:      Rate and Rhythm: Normal rate.   Pulmonary:      Breath sounds: Normal breath sounds.   Chest:      Chest wall: No tenderness.   Abdominal:      General: Abdomen is flat.      Palpations: Abdomen is soft.      Tenderness: There is no abdominal tenderness.   Genitourinary:     Penis: Normal.       Testes: Normal.      Comments: Prostate 60 grams. No nodularity or induration  Musculoskeletal:      Cervical back: Normal range of motion.   Skin:     General: Skin is warm.   Neurological:      General: No focal deficit present.      Mental Status: He is alert and oriented to person, place, and time.         Lab Results   Component Value Date    BUN 9 02/14/2022    CREATININE 0.9 02/14/2022    WBC 5.25 02/14/2022    HGB 15.7  02/14/2022    HCT 45.6 02/14/2022     02/14/2022    AST 20 02/14/2022    ALT 14 02/14/2022    ALKPHOS 83 02/14/2022    ALBUMIN 4.2 02/14/2022    HGBA1C 5.1 02/14/2022        Lab Results   Component Value Date    PSA 7.9 (H) 02/14/2022    PSA 5.5 (H) 07/18/2018    PSADIAG 4.1 (H) 04/14/2016       Imaging:  none    ASSESSMENT     1. BPH with urinary obstruction    2. Elevated PSA    3. Incomplete bladder emptying      PLAN:   BPH with urinary obstruction  -     tamsulosin (FLOMAX) 0.4 mg Cap; Take 1 capsule (0.4 mg total) by mouth every evening.  Dispense: 30 capsule; Refill: 11    Elevated PSA  -     Ambulatory referral/consult to Urology  -     MRI Prostate W W/O Contrast; Future; Expected date: 03/15/2022    Incomplete bladder emptying    Given his family history, rising PSA despite negative biopsy in 2018, recommend a prostate MRI at this time.   Will follow up with patient after MRI. If suspicious area of prostate found on imaging will proceed with a URONAV TRUS biopsy at a later date     Given his LUTS and  - will start him on Flomax 0.4mg daily at this time to help with his voiding complaints     All questions and concerns about his elevated PSA were discussed at this time    Follow up for MRI of prostate.  The patient has been examined and the H&P has been reviewed:  I agreed with the above and additional info added to the notes.    Wil Rhodes MD     Letter to Lawrence Denson MD

## 2022-03-25 ENCOUNTER — PROCEDURE VISIT (OUTPATIENT)
Dept: DERMATOLOGY | Facility: CLINIC | Age: 62
End: 2022-03-25
Payer: COMMERCIAL

## 2022-03-25 DIAGNOSIS — D22.9 ATYPICAL NEVUS: Primary | ICD-10-CM

## 2022-03-25 PROCEDURE — 88305 TISSUE EXAM BY PATHOLOGIST: CPT | Mod: 26,,, | Performed by: PATHOLOGY

## 2022-03-25 PROCEDURE — 11402 PR EXC SKIN BENIG 1.1-2 CM TRUNK,ARM,LEG: ICD-10-PCS | Mod: 51,S$GLB,, | Performed by: DERMATOLOGY

## 2022-03-25 PROCEDURE — 99499 UNLISTED E&M SERVICE: CPT | Mod: S$GLB,,, | Performed by: DERMATOLOGY

## 2022-03-25 PROCEDURE — 99499 NO LOS: ICD-10-PCS | Mod: S$GLB,,, | Performed by: DERMATOLOGY

## 2022-03-25 PROCEDURE — 88305 TISSUE EXAM BY PATHOLOGIST: ICD-10-PCS | Mod: 26,,, | Performed by: PATHOLOGY

## 2022-03-25 PROCEDURE — 88305 TISSUE EXAM BY PATHOLOGIST: CPT | Performed by: PATHOLOGY

## 2022-03-25 PROCEDURE — 12032 INTMD RPR S/A/T/EXT 2.6-7.5: CPT | Mod: S$GLB,,, | Performed by: DERMATOLOGY

## 2022-03-25 PROCEDURE — 11402 EXC TR-EXT B9+MARG 1.1-2 CM: CPT | Mod: 51,S$GLB,, | Performed by: DERMATOLOGY

## 2022-03-25 PROCEDURE — 12032 PR LAYR CLOS WND TRUNK,ARM,LEG 2.6-7.5 CM: ICD-10-PCS | Mod: S$GLB,,, | Performed by: DERMATOLOGY

## 2022-03-25 NOTE — PROGRESS NOTES
PROCEDURE: Elliptical excision with intermediate layered repair in order to decrease dead space and decrease tension.    ANESTHETIC: 4 cc 1% Xylocaine with Epinephrine 1:100,000, buffered    SURGEON: Michelle Zendejas M.D.    ASSISTANTS: Yoli Morrow LPN    PREOPERATIVE DIAGNOSIS:  Moderately Atypical Nevus    POSTOPERATIVE DIAGNOSIS:  Same as preoperative diagnosis    PATHOLOGIC DIAGNOSIS: Pending    LOCATION: R upper back    INITIAL LESION SIZE: 1x0.9 cm    EXCISED DIAMETER: 1.4 cm    PREPARATION: The diagnosis, procedure, alternatives, benefits and risks, including but not limited to: infection, bleeding/bruising, drug reactions, pain, scar or cosmetic defect, local sensation disturbances, wound dehiscence (separation of wound edges after sutures removed) and/or recurrence of present condition were explained to the patient. The patient elected to proceed.  Patient's identity was verified using 2 patient identifiers and the side and site was verified.  Time out period with surgeon, assistant and patient in surgical suite was taken.    PROCEDURE: The location noted above was prepped, draped, and anesthetized in the usual sterile fashion per Yoli Morrow LPN. Lesional tissue was carefully marked with at least 2 mm margins of clinically normal skin in all directions. A fusiform elliptical excision was done with #15 blade carried down completely through the dermis into the deep subcutaneous tissues to the level of the non-muscle fascia, and dissection was carried out in that plane.  Electrocoagulation was used to obtain hemostasis. Blood loss was minimal. The wound was then approximated in a layered fashion with subcutaneous and intradermal sutures of 3.0 Monocryl, approximately 4 in number, and the wound was then superficially closed with simple interrupted sutures of 4.0 Prolene.    The patient tolerated the procedure well.    The area was cleaned and dressed appropriately and the patient was given wound care  instructions, as well as an appointment for follow-up evaluation.    LENGTH OF REPAIR: 4.5 cm    rtc 2 wks for suture removal

## 2022-03-25 NOTE — PATIENT INSTRUCTIONS
Post- Operative Wound Care    Your doctor has performed local skin surgery today.  Vaseline ointment and a pressure bandage were placed after the surgery.  It is very important that you keep this bandage in place for 24 hours.  This will decrease the risk of post - operative infection and bleeding.  After 24 hours, you may remove the band aid and wash the area with warm soap and water and apply Vaseline ointment.  Many patients prefer to use Neosporin or Bacitracin ointment.  This is acceptable; however know that you can develop an allergy to this medication even if you have used it safely for years.  It is important to keep the area moist.  Letting it dry out and get air slows healing time, will worsen the scar, and make it more difficult to remove the stitches.  Band aid is optional after first 24 hours.    It is best NOT to use hydrogen peroxide or antibacterial soap to wash the operative site.       If you notice increasing redness, tenderness, pain, or yellow drainage at the biopsy or surgical site, please notify your doctor.  These are signs of an infection.    If your biopsy/surgical site is bleeding, apply firm pressure for 15 minutes straight.  Repeat for another 15 minutes, if it is still bleeding.   If the surgical site continues to bleed, then please contact your doctor.      For [facility] users:   You will receive your pathology results in [facility] as soon as they are available. Please be assured that your physician/provider will review your results and contact you should additional treatment be required. This is one more way [employer] is putting you first.       [facility] [place] [address]/ [phone] FAX/ [email or web address]

## 2022-04-04 LAB
FINAL PATHOLOGIC DIAGNOSIS: NORMAL
GROSS: NORMAL
Lab: NORMAL
MICROSCOPIC EXAM: NORMAL

## 2022-04-08 ENCOUNTER — CLINICAL SUPPORT (OUTPATIENT)
Dept: DERMATOLOGY | Facility: CLINIC | Age: 62
End: 2022-04-08
Payer: COMMERCIAL

## 2022-04-08 DIAGNOSIS — Z48.02 VISIT FOR SUTURE REMOVAL: Primary | ICD-10-CM

## 2022-04-08 PROCEDURE — 99499 UNLISTED E&M SERVICE: CPT | Mod: S$GLB,,, | Performed by: DERMATOLOGY

## 2022-04-08 PROCEDURE — 99499 NO LOS: ICD-10-PCS | Mod: S$GLB,,, | Performed by: DERMATOLOGY

## 2022-04-08 PROCEDURE — 99999 PR PBB SHADOW E&M-EST. PATIENT-LVL I: CPT | Mod: PBBFAC,,,

## 2022-04-08 PROCEDURE — 99999 PR PBB SHADOW E&M-EST. PATIENT-LVL I: ICD-10-PCS | Mod: PBBFAC,,,

## 2022-04-08 NOTE — PROGRESS NOTES
"CC: 61 y.o.male patient is here for suture removal.     HPI: Patient is s/p excision of moderately atypical on 03/25/2022.  Patient reports no problems.    WOUND PE:  Sutures intact.  Wound healing well.  Good approximation of skin edges.  No signs or symptoms of infection.    IMPRESSION: 1. Skin, right upper back, excision:  - HEALING BIOPSY SITE, NO RESIDUAL MELANOCYTIC NEOPLASM IS PRESENT.  Container Label: Clinic Number/AP Number: [id number] , and "right upper back"  Received in formalin is a 2.8 x 1.4 cm unoriented ellipse of skin excised to a depth of 0.7 cm. The skin  surface has a central 0.7 x 0.7 cm well-healed scar 0.3 cm from the nearest peripheral margin. Specimen is  inked blue, serially sectioned, and entirely submitted in 8785 as follows:  TRUDY GUADARRAMA  Sections show changes consistent with previous procedure in the absence of residual melanocytic  neoplasm.  REYNA KUMAR M.D. 4/4/2022 11:59  ORDERING/ATTENDING PHYSICIAN(S)  CLINICAL DIAGNOSIS / INFORMATION  SPECIMEN     PLAN:  Sutures removed today.  Continue wound care.    RTC: In for skin check or sooner should any new concerns arise      "

## 2022-04-22 ENCOUNTER — HOSPITAL ENCOUNTER (OUTPATIENT)
Dept: RADIOLOGY | Facility: HOSPITAL | Age: 62
Discharge: HOME | End: 2022-04-22
Attending: UROLOGY
Payer: COMMERCIAL

## 2022-04-22 DIAGNOSIS — R97.20 ELEVATED PSA: ICD-10-CM

## 2022-04-22 PROCEDURE — A9585 GADOBUTROL INJECTION: HCPCS | Performed by: UROLOGY

## 2022-04-22 PROCEDURE — 25500020 PHARM REV CODE 255: Performed by: UROLOGY

## 2022-04-22 PROCEDURE — 72197 MRI PELVIS W/O & W/DYE: CPT | Mod: TC

## 2022-04-22 PROCEDURE — 72197 MRI PELVIS W/O & W/DYE: CPT | Mod: 26,,, | Performed by: RADIOLOGY

## 2022-04-22 PROCEDURE — 72197 MRI PROSTATE W W/O CONTRAST: ICD-10-PCS | Mod: 26,,, | Performed by: RADIOLOGY

## 2022-04-22 RX ORDER — GADOBUTROL 604.72 MG/ML
10 INJECTION INTRAVENOUS
Status: COMPLETED | OUTPATIENT
Start: 2022-04-22 | End: 2022-04-22

## 2022-04-22 RX ADMIN — GADOBUTROL 10 ML: 604.72 INJECTION INTRAVENOUS at 03:04

## 2022-04-23 NOTE — PROGRESS NOTES
Urology - [facility]  Clinic Note    SUBJECTIVE:     Chief Complaint: elevated PSA    History of Present Illness:  Dandy Gale is a 61 y.o. male who presents to clinic for elevated PSA. He is here after MRI of prostate.  Unable to tolerate flomax and stopped taking it.    PSA 7.9. 2018 PSA 5.5. Denies dysuria and hematuria. Prostate biopsy by Dr. Nunez 11/15/2018 which was negative for malignancy    Family history of prostate cancer - brother 56 yo, father 60's.    He has LUTS. He has urgency, frequency 10+, incomplete voiding (he does double void), intermittency, nocturia x4 until he started a CPAP for ALONDRA. Now 1x per night.  Consumes an alcoholic beverage 3-4 nights a week which causes urgency.    He works at a  shop.        Anticoagulation:  No    OBJECTIVE:     Estimated body mass index is 25.95 kg/m² as calculated from the following:    Height as of this encounter: 6' (1.829 m).    Weight as of this encounter: 86.8 kg (191 lb 5.8 oz).    Vital Signs (Most Recent)  Pulse: (!) 48 (04/26/22 1422)  BP: 123/68 (04/26/22 1422)    Physical Exam  Constitutional:       Appearance: Normal appearance.   HENT:      Head: Normocephalic.      Nose: Nose normal.   Cardiovascular:      Rate and Rhythm: Normal rate.   Pulmonary:      Breath sounds: Normal breath sounds.   Chest:      Chest wall: No tenderness.   Abdominal:      General: Abdomen is flat.      Palpations: Abdomen is soft.      Tenderness: There is no abdominal tenderness.   Genitourinary:     Penis: Normal.       Testes: Normal.      Comments: Prostate 60 grams. No nodularity or induration  Musculoskeletal:      Cervical back: Normal range of motion.   Skin:     General: Skin is warm.   Neurological:      General: No focal deficit present.      Mental Status: He is alert and oriented to person, place, and time.         Lab Results   Component Value Date    BUN 9 02/14/2022    CREATININE 0.9 02/14/2022    WBC 5.25  02/14/2022    HGB 15.7 02/14/2022    HCT 45.6 02/14/2022     02/14/2022    AST 20 02/14/2022    ALT 14 02/14/2022    ALKPHOS 83 02/14/2022    ALBUMIN 4.2 02/14/2022    HGBA1C 5.1 02/14/2022        Lab Results   Component Value Date    PSA 7.9 (H) 02/14/2022    PSA 5.5 (H) 07/18/2018    PSADIAG 4.1 (H) 04/14/2016       Imaging:  MRI of prostate 4/22/22  Previous biopsy: No carcinoma identified.  11/15/2018  PSA: 7.9 ng/mL 02/14/2022   Prior therapy: None   Prostate: 5.6 x 5.6 x 6.4 cm corresponding to a computed volume of 84.52 cc.  Peripheral zone:   Lesion (YANIRA) 1   Location: Side: left; Region: base; Zone: posterior peripheral zone laterally  Greatest dimension: 1.4 cm   T2-WI: Circumscribed, homogeneous moderate hypointense focus/mass confined to prostate and <1.5 cm in greatest dimension, score 4.  DWI/ADC: Focal markedly hypointense on ADC and markedly hyperintense on high b-value DWI; <1.5 cm in greatest dimension, score 4.  DCE: Positive  Extraprostatic extension: Negative   PI-RADS assessment category: 4  Transitional zone: Benign prostatic hyperplasia without focal suspicious abnormality, score 2.  Neurovascular bundle: Normal appearance.  Seminal vesicles: Normal appearance.   Adjacent Organ Involvement: No evidence for urinary bladder or rectal invasion.  Lymphadenopathy: None.  Other Findings: Fat containing right inguinal hernia.  Impression:  1. Suspicious lesion in the left base peripheral zone.  No evidence for extraprostatic extension.  Overall Assessment: PI-RADS 4 - High (clinically significant cancer is likely to be present)    ASSESSMENT     1. Elevated PSA    2. Incomplete bladder emptying    3. Frequency of micturition    4. BPH with urinary obstruction      PLAN:   Elevated PSA  -     Transrectal Ultrasound w/ Biopsy; Future  -     Specimen to Pathology Urology  -     sodium phosphates (FLEET ENEMA) 19-7 gram/118 mL Enem; Place 1 enema rectally once. for 1 dose  Dispense: 1 enema;  Refill: 1  -     ciprofloxacin HCl (CIPRO) 500 MG tablet; Take 1 tablet (500 mg total) by mouth 2 (two) times daily. for 4 doses  Dispense: 4 tablet; Refill: 0    Incomplete bladder emptying    Frequency of micturition    BPH with urinary obstruction  -     Cystoscopy; Future    Other orders  -     cefTRIAXone injection 1 g  -     LIDOcaine HCl 2% urojet  -     doxycycline tablet 100 mg    reviewed his MRI of prostate.  Will schedule him for UroNav bx of prostate.  Cysto to evaluate incomplete bladder emptying, weak urine flow.    The patient will be scheduled for a prostate biopsy.    The risks and benefits of the procedure were discussed with the patient in detail.  The risks include but are not limited to bleeding, infection, pain, bloody ejaculation, and need for further procedures.    The patient was told to stop all blood thinners at least one week prior to the procedure.    The patient will do a fleets enema the AM of the biopsy and take antibiotics beginning the PM prior to the procedure.    Wil Rhodes MD     Follow up UroNav bx of prostate, for Cysto.

## 2022-04-26 ENCOUNTER — OFFICE VISIT (OUTPATIENT)
Dept: UROLOGY | Facility: CLINIC | Age: 62
End: 2022-04-26
Payer: COMMERCIAL

## 2022-04-26 VITALS
DIASTOLIC BLOOD PRESSURE: 68 MMHG | HEIGHT: 72 IN | BODY MASS INDEX: 25.92 KG/M2 | WEIGHT: 191.38 LBS | SYSTOLIC BLOOD PRESSURE: 123 MMHG | HEART RATE: 48 BPM

## 2022-04-26 DIAGNOSIS — R97.20 ELEVATED PSA: Primary | ICD-10-CM

## 2022-04-26 DIAGNOSIS — N13.8 BPH WITH URINARY OBSTRUCTION: ICD-10-CM

## 2022-04-26 DIAGNOSIS — R35.0 FREQUENCY OF MICTURITION: ICD-10-CM

## 2022-04-26 DIAGNOSIS — R33.9 INCOMPLETE BLADDER EMPTYING: ICD-10-CM

## 2022-04-26 DIAGNOSIS — N40.1 BPH WITH URINARY OBSTRUCTION: ICD-10-CM

## 2022-04-26 PROCEDURE — 3074F SYST BP LT 130 MM HG: CPT | Mod: CPTII,S$GLB,, | Performed by: UROLOGY

## 2022-04-26 PROCEDURE — 99214 PR OFFICE/OUTPT VISIT, EST, LEVL IV, 30-39 MIN: ICD-10-PCS | Mod: S$GLB,,, | Performed by: UROLOGY

## 2022-04-26 PROCEDURE — 3008F PR BODY MASS INDEX (BMI) DOCUMENTED: ICD-10-PCS | Mod: CPTII,S$GLB,, | Performed by: UROLOGY

## 2022-04-26 PROCEDURE — 3078F DIAST BP <80 MM HG: CPT | Mod: CPTII,S$GLB,, | Performed by: UROLOGY

## 2022-04-26 PROCEDURE — 3044F PR MOST RECENT HEMOGLOBIN A1C LEVEL <7.0%: ICD-10-PCS | Mod: CPTII,S$GLB,, | Performed by: UROLOGY

## 2022-04-26 PROCEDURE — 99999 PR PBB SHADOW E&M-EST. PATIENT-LVL III: ICD-10-PCS | Mod: PBBFAC,,, | Performed by: UROLOGY

## 2022-04-26 PROCEDURE — 1159F PR MEDICATION LIST DOCUMENTED IN MEDICAL RECORD: ICD-10-PCS | Mod: CPTII,S$GLB,, | Performed by: UROLOGY

## 2022-04-26 PROCEDURE — 3074F PR MOST RECENT SYSTOLIC BLOOD PRESSURE < 130 MM HG: ICD-10-PCS | Mod: CPTII,S$GLB,, | Performed by: UROLOGY

## 2022-04-26 PROCEDURE — 3008F BODY MASS INDEX DOCD: CPT | Mod: CPTII,S$GLB,, | Performed by: UROLOGY

## 2022-04-26 PROCEDURE — 1159F MED LIST DOCD IN RCRD: CPT | Mod: CPTII,S$GLB,, | Performed by: UROLOGY

## 2022-04-26 PROCEDURE — 3078F PR MOST RECENT DIASTOLIC BLOOD PRESSURE < 80 MM HG: ICD-10-PCS | Mod: CPTII,S$GLB,, | Performed by: UROLOGY

## 2022-04-26 PROCEDURE — 99999 PR PBB SHADOW E&M-EST. PATIENT-LVL III: CPT | Mod: PBBFAC,,, | Performed by: UROLOGY

## 2022-04-26 PROCEDURE — 99214 OFFICE O/P EST MOD 30 MIN: CPT | Mod: S$GLB,,, | Performed by: UROLOGY

## 2022-04-26 PROCEDURE — 3044F HG A1C LEVEL LT 7.0%: CPT | Mod: CPTII,S$GLB,, | Performed by: UROLOGY

## 2022-04-26 RX ORDER — LIDOCAINE HYDROCHLORIDE 20 MG/ML
JELLY TOPICAL ONCE
Status: CANCELLED | OUTPATIENT
Start: 2022-04-26 | End: 2022-04-26

## 2022-04-26 RX ORDER — DIAZEPAM 5 MG/1
5 TABLET ORAL
Qty: 3 TABLET | Refills: 0 | Status: SHIPPED | OUTPATIENT
Start: 2022-04-26

## 2022-04-26 RX ORDER — DOXYCYCLINE HYCLATE 100 MG
100 TABLET ORAL ONCE
Status: CANCELLED | OUTPATIENT
Start: 2022-04-26 | End: 2022-04-26

## 2022-04-26 RX ORDER — CIPROFLOXACIN 500 MG/1
500 TABLET ORAL 2 TIMES DAILY
Qty: 4 TABLET | Refills: 0 | Status: SHIPPED | OUTPATIENT
Start: 2022-04-26 | End: 2022-04-28

## 2022-04-26 RX ORDER — CEFTRIAXONE 1 G/1
1 INJECTION, POWDER, FOR SOLUTION INTRAMUSCULAR; INTRAVENOUS
Status: COMPLETED | OUTPATIENT
Start: 2022-05-03 | End: 2022-05-19

## 2022-04-26 RX ORDER — ENEMA 19; 7 G/133ML; G/133ML
1 ENEMA RECTAL ONCE
Qty: 1 ENEMA | Refills: 1 | Status: SHIPPED | OUTPATIENT
Start: 2022-04-26 | End: 2022-04-26

## 2022-04-26 NOTE — PATIENT INSTRUCTIONS
The patient will be scheduled for a prostate biopsy.    The risks and benefits of the procedure were discussed with the patient in detail.  The risks include but are not limited to bleeding, infection, pain, bloody ejaculation, and need for further procedures.    The patient was told to stop all blood thinners at least one week prior to the procedure.    The patient will do a fleets enema the AM of the biopsy and take antibiotics beginning the PM prior to the procedure.

## 2022-05-19 ENCOUNTER — PROCEDURE VISIT (OUTPATIENT)
Dept: UROLOGY | Facility: CLINIC | Age: 62
End: 2022-05-19
Payer: COMMERCIAL

## 2022-05-19 VITALS
SYSTOLIC BLOOD PRESSURE: 135 MMHG | DIASTOLIC BLOOD PRESSURE: 72 MMHG | HEIGHT: 72 IN | WEIGHT: 196.5 LBS | RESPIRATION RATE: 16 BRPM | HEART RATE: 48 BPM | BODY MASS INDEX: 26.61 KG/M2 | TEMPERATURE: 98 F

## 2022-05-19 DIAGNOSIS — N40.1 ENLARGED PROSTATE WITH URINARY OBSTRUCTION: Primary | ICD-10-CM

## 2022-05-19 DIAGNOSIS — N13.8 ENLARGED PROSTATE WITH URINARY OBSTRUCTION: Primary | ICD-10-CM

## 2022-05-19 DIAGNOSIS — R97.20 ELEVATED PSA: ICD-10-CM

## 2022-05-19 DIAGNOSIS — N13.8 BPH WITH URINARY OBSTRUCTION: ICD-10-CM

## 2022-05-19 DIAGNOSIS — N40.1 BPH WITH URINARY OBSTRUCTION: ICD-10-CM

## 2022-05-19 PROCEDURE — 88305 TISSUE EXAM BY PATHOLOGIST: CPT | Performed by: PATHOLOGY

## 2022-05-19 PROCEDURE — 52000 CYSTOURETHROSCOPY: CPT | Mod: S$GLB,,, | Performed by: UROLOGY

## 2022-05-19 PROCEDURE — 96372 THER/PROPH/DIAG INJ SC/IM: CPT | Mod: 59,S$GLB,, | Performed by: UROLOGY

## 2022-05-19 PROCEDURE — 88305 TISSUE EXAM BY PATHOLOGIST: ICD-10-PCS | Mod: 26,,, | Performed by: PATHOLOGY

## 2022-05-19 PROCEDURE — 52000 CYSTOSCOPY: ICD-10-PCS | Mod: S$GLB,,, | Performed by: UROLOGY

## 2022-05-19 PROCEDURE — 96372 PR INJECTION,THERAP/PROPH/DIAG2ST, IM OR SUBCUT: ICD-10-PCS | Mod: 59,S$GLB,, | Performed by: UROLOGY

## 2022-05-19 PROCEDURE — 88305 TISSUE EXAM BY PATHOLOGIST: CPT | Mod: 26,,, | Performed by: PATHOLOGY

## 2022-05-19 RX ORDER — LIDOCAINE HYDROCHLORIDE 10 MG/ML
20 INJECTION INFILTRATION; PERINEURAL
Status: COMPLETED | OUTPATIENT
Start: 2022-05-19 | End: 2022-05-19

## 2022-05-19 RX ORDER — LIDOCAINE HYDROCHLORIDE 20 MG/ML
JELLY TOPICAL ONCE
Status: COMPLETED | OUTPATIENT
Start: 2022-05-19 | End: 2022-05-19

## 2022-05-19 RX ADMIN — LIDOCAINE HYDROCHLORIDE: 20 JELLY TOPICAL at 08:05

## 2022-05-19 RX ADMIN — LIDOCAINE HYDROCHLORIDE 20 ML: 10 INJECTION INFILTRATION; PERINEURAL at 08:05

## 2022-05-19 RX ADMIN — CEFTRIAXONE 1 G: 1 INJECTION, POWDER, FOR SOLUTION INTRAMUSCULAR; INTRAVENOUS at 08:05

## 2022-05-19 NOTE — PROCEDURES
Procedures     Procedure Date:  05/19/2022    Procedure:   UroNav MRI/US fusion biopsy of the prostate                                                                     Transrectal Ultrasound of the Prostate                                       Transrectal Ultrasound Guided Prostate Biopsy                                - With Pudendal Nerve Block                                                                                      Pre-OP Diagnosis:                                                                 Elevated PSA, prostate lesions                                              Post-OP Diagnosis:                                                                Awaiting final pathology                                                Anesthesia:                                                                       Anesthesia Administered:                                                     Intrarectal instillation of 10 mL 2% lidocaine (Xylocaine) jelly.       Findings:                                                                         --- Transrectal Ultrasound of the Prostate ---                               Prostate measurements.                                                                                               PSA:   Lab Results   Component Value Date    PSA 7.9 (H) 02/14/2022    PSADIAG 4.1 (H) 04/14/2016            - Volume:85 gm.           PSA Density: 0.09                                                         yes hypoechoic area in the left prostate    MRI of prostate 4/22/22  Previous biopsy: No carcinoma identified.  11/15/2018  PSA: 7.9 ng/mL 02/14/2022   Prior therapy: None   Prostate: 5.6 x 5.6 x 6.4 cm corresponding to a computed volume of 84.52 cc.  Peripheral zone:   Lesion (YANIRA) 1   Location: Side: left; Region: base; Zone: posterior peripheral zone laterally  Greatest dimension: 1.4 cm   T2-WI: Circumscribed, homogeneous moderate hypointense focus/mass confined to prostate  and <1.5 cm in greatest dimension, score 4.  DWI/ADC: Focal markedly hypointense on ADC and markedly hyperintense on high b-value DWI; <1.5 cm in greatest dimension, score 4.  DCE: Positive  Extraprostatic extension: Negative   PI-RADS assessment category: 4  Transitional zone: Benign prostatic hyperplasia without focal suspicious abnormality, score 2.  Neurovascular bundle: Normal appearance.  Seminal vesicles: Normal appearance.   Adjacent Organ Involvement: No evidence for urinary bladder or rectal invasion.  Lymphadenopathy: None.  Other Findings: Fat containing right inguinal hernia.  Impression:  1. Suspicious lesion in the left base peripheral zone.  No evidence for extraprostatic extension.  Overall Assessment: PI-RADS 4 - High (clinically significant cancer is likely to be present)                          Description of Procedure:                                                         Informed Consent:                                                            - Risks, benefits and alternatives of procedure discussed with               patient and informed consent obtained.                                       Patient Position:                                                            - Left lateral.                                                              --- Transrectal Ultrasound of the Prostate ---                               Instruments:                                                                 - Bruel and Aroldoaer.                                                           --- Transrectal U/S Biopsy ---                                               Instruments:                                                                 - Spring-loaded gun used for biopsy.                                         Procedure Details:                                                           MRI imaging of the prostate was preloaded to the UroNav machine.         US of prostate was performed and its image  was super-imposed with that of MRI.  The target lesions identified and the biopsies were obtained using US guided UroNav guidance.    Biopsy Core Details:                                                         - Twelve core(s) in total.                                                   - Cores Taken From: Right apex x 2, right mid prostate x 3, right            base x 2, left apex x 2, left mid prostate x 2 and left base x 2.  Lesion; 4 cores obtained from each lesion             Estimated Blood Loss:                                                        - Minimal.                                                                   Pathology Specimen:                                                          - The specimen sent.                                                    Complications:                                                                    No immediate complications.                                             Post-OP Plan:                                                                                            Patient was discharged home in a stable condition.         Medications: finish off cipro given.         Will call him with the bx result.          If fever or unable to void, RTC or emergency room immediately.                                           RTC 6 months with PSA.

## 2022-05-19 NOTE — PROCEDURES
Cystoscopy    Date/Time: 5/19/2022 8:15 AM  Performed by: Wil Rhodes MD  Authorized by: Wil Rhodes MD     Comments:      Procedure Date:  05/19/2022      Procedure:  Male Diagnostic Cystourethroscopy    Pre-op diagnosis: enlarged prostate with urinary obstruction  Post-op diagnosis: same  Anesthesia: Local  Surgeon:  Wil Rhodes MD    Findings:  Urethra:  Normal urethra.   Sphincter: competent.  Prostate: Estimated Length Prostatic Urethra: 7 cm with trilobar obstruction, moderate size intravesical lobe enlargement  Bladder neck: patent with no stricture  Bladder:  Normal bladder. 2+ trabeculation. No stone or tumor seen.  Normal ureteral orifices bilaterally.   Moderate trabeculation.     Description of Procedure:                                                         Informed Consent:                                                            - Risks, benefits and alternatives of procedure discussed with               patient and informed consent obtained.       Patient Position:   - Supine. --- Bladder ---   Prep and Drape:   - Patient prepped and draped in usual sterile fashion using povidone     iodine (Betadine).   Instruments:   - 16 Fr flexible cystoscope with 0 degree lens.   Procedure Details:   - Cystoscope passed under vision into bladder.   - Bladder and urethra examined in their entirety with findings as     above.     Conclusion:  1. Enlarged prostate with obstruction  Will need bipolar TURP.  Await TRUS bx result.    Plan:  Patient was discharged home in a stable condition.  Medications: rocephin  Follow up:  Will call.

## 2022-05-19 NOTE — PATIENT INSTRUCTIONS
What to Expect After a Prostate Biopsy    Please be sure to finish your pre-procedure antibiotics as instructed.    You may have mild bleeding from the rectum or urine for about 1 week to 1 month, or in your ejaculate for several months. This bleeding is normal and expected, and it will stop. You may have mild discomfort in your rectal or urethral area for 24-48 hours.    You cannot do any strenuous lifting, straining, or exercising for 24 hours. You may return to full activity the day after the biopsy.    You may continue to take all your regular medications after the procedure except for the blood thinners.    You may resume all blood-thinning medications once you no longer see any bleeding or whenever your physician prescribing the medication says it is all right to do so. You may take Tylenol if you have a fever and your temperature is less than 100° F or if you have some discomfort.    You will receive a call from the Urology Department at [facility] with the results of your prostate biopsy within one week.    Signs and Symptoms to Report    Call your [facility] urologist at [phone] if you develop any of the following:  Temperature greater than 101°  F  Inability to urinate  A large amount of bleeding from the rectum or in the urine  Persistent or severe pain    After hours or on weekends, you may reach a urology resident on call at this number: [phone].    What to Expect After a Cystoscopy  For the next 24-48 hours, you may feel a mild burning when you urinate. This burning is normal and expected. Drink plenty of water to dilute the urine to help relieve the burning sensation. You may also see a small amount of blood in your urine after the procedure.    Unless you are already taking antibiotics, you may be given an antibiotic after the test to prevent infection.    Signs and Symptoms to Report  Call the [facility] at [phone] if you develop any of the following:  Fever of 101 degrees or  higher  Chills or persistent bleeding  Inability to urinate

## 2022-05-26 ENCOUNTER — OFFICE VISIT (OUTPATIENT)
Dept: UROLOGY | Facility: CLINIC | Age: 62
End: 2022-05-26
Payer: COMMERCIAL

## 2022-05-26 ENCOUNTER — TELEPHONE (OUTPATIENT)
Dept: UROLOGY | Facility: CLINIC | Age: 62
End: 2022-05-26
Payer: COMMERCIAL

## 2022-05-26 DIAGNOSIS — N41.1 PROSTATITIS, CHRONIC: ICD-10-CM

## 2022-05-26 DIAGNOSIS — N13.8 ENLARGED PROSTATE WITH URINARY OBSTRUCTION: Primary | ICD-10-CM

## 2022-05-26 DIAGNOSIS — N13.8 BPH WITH URINARY OBSTRUCTION: Primary | ICD-10-CM

## 2022-05-26 DIAGNOSIS — N40.1 ENLARGED PROSTATE WITH URINARY OBSTRUCTION: Primary | ICD-10-CM

## 2022-05-26 DIAGNOSIS — R97.20 ELEVATED PSA: ICD-10-CM

## 2022-05-26 DIAGNOSIS — N40.1 BPH WITH URINARY OBSTRUCTION: Primary | ICD-10-CM

## 2022-05-26 DIAGNOSIS — R33.9 INCOMPLETE BLADDER EMPTYING: ICD-10-CM

## 2022-05-26 LAB
FINAL PATHOLOGIC DIAGNOSIS: NORMAL
Lab: NORMAL

## 2022-05-26 PROCEDURE — 99215 OFFICE O/P EST HI 40 MIN: CPT | Mod: 95,,, | Performed by: UROLOGY

## 2022-05-26 PROCEDURE — 3044F HG A1C LEVEL LT 7.0%: CPT | Mod: CPTII,95,, | Performed by: UROLOGY

## 2022-05-26 PROCEDURE — 99215 PR OFFICE/OUTPT VISIT, EST, LEVL V, 40-54 MIN: ICD-10-PCS | Mod: 95,,, | Performed by: UROLOGY

## 2022-05-26 PROCEDURE — 3044F PR MOST RECENT HEMOGLOBIN A1C LEVEL <7.0%: ICD-10-PCS | Mod: CPTII,95,, | Performed by: UROLOGY

## 2022-05-26 RX ORDER — FINASTERIDE 5 MG/1
5 TABLET, FILM COATED ORAL DAILY
Qty: 30 TABLET | Refills: 11 | Status: SHIPPED | OUTPATIENT
Start: 2022-05-26 | End: 2022-09-02

## 2022-05-26 RX ORDER — SULFAMETHOXAZOLE AND TRIMETHOPRIM 800; 160 MG/1; MG/1
1 TABLET ORAL 2 TIMES DAILY
Qty: 60 TABLET | Refills: 0 | Status: SHIPPED | OUTPATIENT
Start: 2022-05-26 | End: 2022-06-25

## 2022-05-26 NOTE — TELEPHONE ENCOUNTER
Procedure Date:  05/19/2022  Procedure:   UroNav MRI/US fusion biopsy of the prostate                                                                     Transrectal Ultrasound of the Prostate                                       Transrectal Ultrasound Guided Prostate Biopsy                                - With Pudendal Nerve Block         Lab Results   Component Value Date     PSA 7.9 (H) 02/14/2022     PSADIAG 4.1 (H) 04/14/2016             - Volume:85 gm.           PSA Density: 0.09                                                         yes hypoechoic area in the left prostate     MRI of prostate 4/22/22  Previous biopsy: No carcinoma identified.  11/15/2018  PSA: 7.9 ng/mL 02/14/2022   Prior therapy: None   Prostate: 5.6 x 5.6 x 6.4 cm corresponding to a computed volume of 84.52 cc.  Peripheral zone:   Lesion (AYNIRA) 1   Location: Side: left; Region: base; Zone: posterior peripheral zone laterally  Greatest dimension: 1.4 cm   T2-WI: Circumscribed, homogeneous moderate hypointense focus/mass confined to prostate and <1.5 cm in greatest dimension, score 4.  DWI/ADC: Focal markedly hypointense on ADC and markedly hyperintense on high b-value DWI; <1.5 cm in greatest dimension, score 4.  DCE: Positive  Extraprostatic extension: Negative   PI-RADS assessment category: 4  Transitional zone: Benign prostatic hyperplasia without focal suspicious abnormality, score 2.  Neurovascular bundle: Normal appearance.  Seminal vesicles: Normal appearance.   Adjacent Organ Involvement: No evidence for urinary bladder or rectal invasion.  Lymphadenopathy: None.  Other Findings: Fat containing right inguinal hernia.  Impression:  1. Suspicious lesion in the left base peripheral zone.  No evidence for extraprostatic extension.  Overall Assessment: PI-RADS 4 - High (clinically significant cancer is likely to be present)    Procedure:  Male Diagnostic Cystourethroscopy  Pre-op diagnosis: enlarged prostate with urinary  obstruction  Findings:  Urethra:  Normal urethra.   Sphincter: competent.  Prostate: Estimated Length Prostatic Urethra: 7 cm with trilobar obstruction, moderate size intravesical lobe enlargement  Bladder neck: patent with no stricture  Bladder:  Normal bladder. 2+ trabeculation. No stone or tumor seen.  Normal ureteral orifices bilaterally.   Moderate trabeculation.       Conclusion:  1. Enlarged prostate with obstruction  Will need bipolar TURP.  Await TRUS bx result.    Pathology  A.   PROSTATE, LEFT APEX, NEEDLE BIOPSY:          Benign prostatic tissue with chronic inflammation.  (R97.20)   B.   PROSTATE, LEFT MID, NEEDLE BIOPSY:          Benign prostatic tissue with chronic inflammation.  (R97.20)   C.   PROSTATE, LEFT BASE, NEEDLE BIOPSY:          Benign prostatic tissue with chronic inflammation.  (R97.20)   D.   PROSTATE, RIGHT APEX, NEEDLE BIOPSY:          Benign prostatic tissue with chronic inflammation.  (R97.20)   E.   PROSTATE, RIGHT MID, NEEDLE BIOPSY:          Benign prostatic tissue with chronic inflammation.  (R97.20)   F.   PROSTATE, RIGHT BASE, NEEDLE BIOPSY:          Benign prostatic tissue with chronic inflammation.  (R97.20)   G.   TISSUE1          TARGET LESION, BIOPSY:          Benign prostatic tissue with chronic inflammation.  (R97.20)     Enlarged prostate with urinary obstruction    Elevated PSA    Incomplete bladder emptying      His Prostate biopsy showed no cancer.  Please schedule him for a virtual visit to discuss his pathology and TURP to treat his LUTS.

## 2022-05-26 NOTE — PROGRESS NOTES
Established Patient - Audio Only Telehealth Visit     The patient location is: home   The chief complaint leading to consultation is: prostate biopsy, LUTS  Visit type: Virtual visit with audio only (telephone)  Total time spent with patient: 40 mins       The reason for the audio only service rather than synchronous audio and video virtual visit was related to technical difficulties or patient preference/necessity.     Each patient to whom I provide medical services by telemedicine is:  (1) informed of the relationship between the physician and patient and the respective role of any other health care provider with respect to management of the patient; and (2) notified that they may decline to receive medical services by telemedicine and may withdraw from such care at any time. Patient verbally consented to receive this service via voice-only telephone call.       HPI: doing a virtual visit following  Prostate bx and cysto.    He has LUTS. He has urgency, frequency 10+, incomplete voiding (he does double void), intermittency, nocturia x4 until he started a CPAP for ALONDRA. Now 1x per night.  Consumes an alcoholic beverage 3-4 nights a week which causes urgency.     He works at a  shop.         Procedure Date:  05/19/2022  Procedure:   UroNav MRI/US fusion biopsy of the prostate                                                                     Transrectal Ultrasound of the Prostate                                       Transrectal Ultrasound Guided Prostate Biopsy                                - With Pudendal Nerve Block         Lab Results   Component Value Date     PSA 7.9 (H) 02/14/2022     PSADIAG 4.1 (H) 04/14/2016             - Volume:85 gm.           PSA Density: 0.09                                                         yes hypoechoic area in the left prostate     MRI of prostate 4/22/22  Previous biopsy: No carcinoma identified.  11/15/2018  PSA: 7.9 ng/mL 02/14/2022   Prior  therapy: None   Prostate: 5.6 x 5.6 x 6.4 cm corresponding to a computed volume of 84.52 cc.  Peripheral zone:   Lesion (YANIRA) 1   Location: Side: left; Region: base; Zone: posterior peripheral zone laterally  Greatest dimension: 1.4 cm   T2-WI: Circumscribed, homogeneous moderate hypointense focus/mass confined to prostate and <1.5 cm in greatest dimension, score 4.  DWI/ADC: Focal markedly hypointense on ADC and markedly hyperintense on high b-value DWI; <1.5 cm in greatest dimension, score 4.  DCE: Positive  Extraprostatic extension: Negative   PI-RADS assessment category: 4  Transitional zone: Benign prostatic hyperplasia without focal suspicious abnormality, score 2.  Neurovascular bundle: Normal appearance.  Seminal vesicles: Normal appearance.   Adjacent Organ Involvement: No evidence for urinary bladder or rectal invasion.  Lymphadenopathy: None.  Other Findings: Fat containing right inguinal hernia.  Impression:  1. Suspicious lesion in the left base peripheral zone.  No evidence for extraprostatic extension.  Overall Assessment: PI-RADS 4 - High (clinically significant cancer is likely to be present)    Procedure:  Male Diagnostic Cystourethroscopy  Pre-op diagnosis: enlarged prostate with urinary obstruction  Findings:  Urethra:  Normal urethra.   Sphincter: competent.  Prostate: Estimated Length Prostatic Urethra: 7 cm with trilobar obstruction, moderate size intravesical lobe enlargement  Bladder neck: patent with no stricture  Bladder:  Normal bladder. 2+ trabeculation. No stone or tumor seen.  Normal ureteral orifices bilaterally.   Moderate trabeculation.       Conclusion:  1. Enlarged prostate with obstruction  Will need bipolar TURP.  Await TRUS bx result.    Pathology  A.   PROSTATE, LEFT APEX, NEEDLE BIOPSY:          Benign prostatic tissue with chronic inflammation.  (R97.20)   B.   PROSTATE, LEFT MID, NEEDLE BIOPSY:          Benign prostatic tissue with chronic inflammation.  (R97.20)   C.    PROSTATE, LEFT BASE, NEEDLE BIOPSY:          Benign prostatic tissue with chronic inflammation.  (R97.20)   D.   PROSTATE, RIGHT APEX, NEEDLE BIOPSY:          Benign prostatic tissue with chronic inflammation.  (R97.20)   E.   PROSTATE, RIGHT MID, NEEDLE BIOPSY:          Benign prostatic tissue with chronic inflammation.  (R97.20)   F.   PROSTATE, RIGHT BASE, NEEDLE BIOPSY:          Benign prostatic tissue with chronic inflammation.  (R97.20)   G.   TISSUE1          TARGET LESION, BIOPSY:          Benign prostatic tissue with chronic inflammation.  (R97.20)     BPH with urinary obstruction  -     finasteride (PROSCAR) 5 mg tablet; Take 1 tablet (5 mg total) by mouth once daily.  Dispense: 30 tablet; Refill: 11    Prostatitis, chronic  -     sulfamethoxazole-trimethoprim 800-160mg (BACTRIM DS) 800-160 mg Tab; Take 1 tablet by mouth 2 (two) times daily.  Dispense: 60 tablet; Refill: 0    Elevated PSA  -     Prostate Specific Antigen, Diagnostic; Future; Expected date: 05/26/2022      His Prostate biopsy showed no cancer.  Explained the results in detail.  He was not able to tolerate flomax due to weakness.  I recommended TURP to treat his LUTS.  Nature and risks of TURP surgery, including but not limited to pain, hematuria, infection, incontinence, impotence, persistent urinary symptoms discussed in depth.  Patient was advised to stop aspirin and any other blood thinning medication 1 week before surgery.  All questions were answered regarding surgery, hospital course and recovery.    However, he would like to try some medication first.  Will start him on finasteride.  Start Bactrim DS for inflammation noted in his prostate.  Will see him in 6 months with PSA.  Daily Probiotics while taking abx recommended.    I spent 40 minutes with the patient of which more than half was spent in direct consultation with the patient in regards to our treatment and plan.      Follow up in about 6 months (around 11/26/2022) for  PSA.      This service was not originating from a related E/M service provided within the previous 7 days nor will  to an E/M service or procedure within the next 24 hours or my soonest available appointment.  Prevailing standard of care was able to be met in this audio-only visit.

## 2022-07-01 ENCOUNTER — OFFICE VISIT (OUTPATIENT)
Dept: UROLOGY | Facility: CLINIC | Age: 62
End: 2022-07-01
Payer: COMMERCIAL

## 2022-07-01 ENCOUNTER — PATIENT MESSAGE (OUTPATIENT)
Dept: UROLOGY | Facility: CLINIC | Age: 62
End: 2022-07-01

## 2022-07-01 ENCOUNTER — LAB VISIT (OUTPATIENT)
Dept: LAB | Facility: HOSPITAL | Age: 62
End: 2022-07-01
Attending: UROLOGY
Payer: COMMERCIAL

## 2022-07-01 ENCOUNTER — TELEPHONE (OUTPATIENT)
Dept: UROLOGY | Facility: CLINIC | Age: 62
End: 2022-07-01
Payer: COMMERCIAL

## 2022-07-01 VITALS
DIASTOLIC BLOOD PRESSURE: 66 MMHG | WEIGHT: 196 LBS | HEIGHT: 72 IN | BODY MASS INDEX: 26.55 KG/M2 | HEART RATE: 48 BPM | SYSTOLIC BLOOD PRESSURE: 104 MMHG

## 2022-07-01 DIAGNOSIS — N48.9 PENILE LESION: Primary | ICD-10-CM

## 2022-07-01 DIAGNOSIS — N48.9 PENILE LESION: ICD-10-CM

## 2022-07-01 DIAGNOSIS — A60.02 HERPES GENITALIS IN MEN: ICD-10-CM

## 2022-07-01 DIAGNOSIS — R97.20 ELEVATED PSA: ICD-10-CM

## 2022-07-01 LAB
HSV1 IGG SERPL QL IA: POSITIVE
HSV2 IGG SERPL QL IA: NEGATIVE

## 2022-07-01 PROCEDURE — 99213 PR OFFICE/OUTPT VISIT, EST, LEVL III, 20-29 MIN: ICD-10-PCS | Mod: S$GLB,,, | Performed by: UROLOGY

## 2022-07-01 PROCEDURE — 3074F PR MOST RECENT SYSTOLIC BLOOD PRESSURE < 130 MM HG: ICD-10-PCS | Mod: CPTII,S$GLB,, | Performed by: UROLOGY

## 2022-07-01 PROCEDURE — 1159F MED LIST DOCD IN RCRD: CPT | Mod: CPTII,S$GLB,, | Performed by: UROLOGY

## 2022-07-01 PROCEDURE — 99213 OFFICE O/P EST LOW 20 MIN: CPT | Mod: S$GLB,,, | Performed by: UROLOGY

## 2022-07-01 PROCEDURE — 3044F HG A1C LEVEL LT 7.0%: CPT | Mod: CPTII,S$GLB,, | Performed by: UROLOGY

## 2022-07-01 PROCEDURE — 86696 HERPES SIMPLEX TYPE 2 TEST: CPT | Performed by: UROLOGY

## 2022-07-01 PROCEDURE — 3008F PR BODY MASS INDEX (BMI) DOCUMENTED: ICD-10-PCS | Mod: CPTII,S$GLB,, | Performed by: UROLOGY

## 2022-07-01 PROCEDURE — 99999 PR PBB SHADOW E&M-EST. PATIENT-LVL IV: ICD-10-PCS | Mod: PBBFAC,,, | Performed by: UROLOGY

## 2022-07-01 PROCEDURE — 3008F BODY MASS INDEX DOCD: CPT | Mod: CPTII,S$GLB,, | Performed by: UROLOGY

## 2022-07-01 PROCEDURE — 3074F SYST BP LT 130 MM HG: CPT | Mod: CPTII,S$GLB,, | Performed by: UROLOGY

## 2022-07-01 PROCEDURE — 36415 COLL VENOUS BLD VENIPUNCTURE: CPT | Performed by: UROLOGY

## 2022-07-01 PROCEDURE — 3078F DIAST BP <80 MM HG: CPT | Mod: CPTII,S$GLB,, | Performed by: UROLOGY

## 2022-07-01 PROCEDURE — 99999 PR PBB SHADOW E&M-EST. PATIENT-LVL IV: CPT | Mod: PBBFAC,,, | Performed by: UROLOGY

## 2022-07-01 PROCEDURE — 3078F PR MOST RECENT DIASTOLIC BLOOD PRESSURE < 80 MM HG: ICD-10-PCS | Mod: CPTII,S$GLB,, | Performed by: UROLOGY

## 2022-07-01 PROCEDURE — 3044F PR MOST RECENT HEMOGLOBIN A1C LEVEL <7.0%: ICD-10-PCS | Mod: CPTII,S$GLB,, | Performed by: UROLOGY

## 2022-07-01 PROCEDURE — 86695 HERPES SIMPLEX TYPE 1 TEST: CPT | Performed by: UROLOGY

## 2022-07-01 PROCEDURE — 1159F PR MEDICATION LIST DOCUMENTED IN MEDICAL RECORD: ICD-10-PCS | Mod: CPTII,S$GLB,, | Performed by: UROLOGY

## 2022-07-01 RX ORDER — BETAMETHASONE DIPROPIONATE 0.5 MG/G
OINTMENT TOPICAL 2 TIMES DAILY
Qty: 15 G | Refills: 0 | Status: SHIPPED | OUTPATIENT
Start: 2022-07-01

## 2022-07-01 RX ORDER — ACYCLOVIR 400 MG/1
400 TABLET ORAL 2 TIMES DAILY
Qty: 10 TABLET | Refills: 3 | Status: SHIPPED | OUTPATIENT
Start: 2022-07-01 | End: 2022-07-06

## 2022-07-01 NOTE — PROGRESS NOTES
CC: CC: penile lesion, painful    HPI;    Dandy Gale is a 61 y.o. man who is here for the evaluation of Other (Check sore on penis)    His PCP, Lawrence Denson MD   C/o painful penile lesion, that he has developed since his last visit me for cysto and Uronav bx of prostate on 5/19/22.  It is painful and started as blisters, then slow healing ulcer type.  Still has red lesion on the gland of the penis, left side.  He has no prior hx of STD.    He has LUTS. He has urgency, frequency 10+, incomplete voiding (he does double void), intermittency, nocturia x4 until he started a CPAP for ALONDRA. Now 1x per night.  Consumes an alcoholic beverage 3-4 nights a week which causes urgency.     He works at a  shop.         Procedure Date:  05/19/2022  Procedure:   UroNav MRI/US fusion biopsy of the prostate                                                                     Transrectal Ultrasound of the Prostate                                       Transrectal Ultrasound Guided Prostate Biopsy                                - With Pudendal Nerve Block         Lab Results   Component Value Date     PSA 7.9 (H) 02/14/2022     PSADIAG 4.1 (H) 04/14/2016             - Volume:85 gm.           PSA Density: 0.09                                                         yes hypoechoic area in the left prostate     MRI of prostate 4/22/22  Previous biopsy: No carcinoma identified.  11/15/2018  PSA: 7.9 ng/mL 02/14/2022   Prior therapy: None   Prostate: 5.6 x 5.6 x 6.4 cm corresponding to a computed volume of 84.52 cc.  Peripheral zone:   Lesion (YANIRA) 1   Location: Side: left; Region: base; Zone: posterior peripheral zone laterally  Greatest dimension: 1.4 cm   T2-WI: Circumscribed, homogeneous moderate hypointense focus/mass confined to prostate and <1.5 cm in greatest dimension, score 4.  DWI/ADC: Focal markedly hypointense on ADC and markedly hyperintense on high b-value DWI; <1.5 cm in greatest dimension,  score 4.  DCE: Positive  Extraprostatic extension: Negative   PI-RADS assessment category: 4  Transitional zone: Benign prostatic hyperplasia without focal suspicious abnormality, score 2.  Neurovascular bundle: Normal appearance.  Seminal vesicles: Normal appearance.   Adjacent Organ Involvement: No evidence for urinary bladder or rectal invasion.  Lymphadenopathy: None.  Other Findings: Fat containing right inguinal hernia.  Impression:  1. Suspicious lesion in the left base peripheral zone.  No evidence for extraprostatic extension.  Overall Assessment: PI-RADS 4 - High (clinically significant cancer is likely to be present)    Procedure:  Male Diagnostic Cystourethroscopy  Pre-op diagnosis: enlarged prostate with urinary obstruction  Findings:  Urethra:  Normal urethra.   Sphincter: competent.  Prostate: Estimated Length Prostatic Urethra: 7 cm with trilobar obstruction, moderate size intravesical lobe enlargement  Bladder neck: patent with no stricture  Bladder:  Normal bladder. 2+ trabeculation. No stone or tumor seen.  Normal ureteral orifices bilaterally.   Moderate trabeculation.       Conclusion:  1. Enlarged prostate with obstruction  Will need bipolar TURP.  Await TRUS bx result.    Pathology  A.   PROSTATE, LEFT APEX, NEEDLE BIOPSY:          Benign prostatic tissue with chronic inflammation.  (R97.20)   B.   PROSTATE, LEFT MID, NEEDLE BIOPSY:          Benign prostatic tissue with chronic inflammation.  (R97.20)   C.   PROSTATE, LEFT BASE, NEEDLE BIOPSY:          Benign prostatic tissue with chronic inflammation.  (R97.20)   D.   PROSTATE, RIGHT APEX, NEEDLE BIOPSY:          Benign prostatic tissue with chronic inflammation.  (R97.20)   E.   PROSTATE, RIGHT MID, NEEDLE BIOPSY:          Benign prostatic tissue with chronic inflammation.  (R97.20)   F.   PROSTATE, RIGHT BASE, NEEDLE BIOPSY:          Benign prostatic tissue with chronic inflammation.  (R97.20)   G.   TISSUE1          TARGET LESION, BIOPSY:           Benign prostatic tissue with chronic inflammation.  (R97.20)       Past Medical History:   Diagnosis Date    Fever blister 1985    Not recently    High blood cholesterol     Joint pain 2020    Probably some arthritis    Squamous cell carcinoma 2016    LEFT MID FOREARM:     Past Surgical History:   Procedure Laterality Date    COLONOSCOPY N/A 2020    Procedure: COLONOSCOPY;  Surgeon: Paulette George MD;  Location: [address]);  Service: Endoscopy;  Laterality: N/A;    FRACTURE SURGERY      l shoulder plate       l thumb plate insertion      SKIN BIOPSY  1995    Should be in chart     Social History     Tobacco Use    Smoking status: Former Smoker     Packs/day: 1.00     Years: 10.00     Pack years: 10.00     Start date: 1980     Quit date: 1990     Years since quittin.5    Smokeless tobacco: Former User     Quit date: 1990   Substance Use Topics    Alcohol use: Yes     Alcohol/week: 2.0 standard drinks     Types: 2 Glasses of wine per week    Drug use: Not Currently     Family History   Problem Relation Age of Onset    Prostate cancer Father     Hearing loss Father     Hypertension Mother     Depression Mother     Cancer Paternal Grandmother         liver? Dx later stage    Heart disease Maternal Grandmother     Heart disease Maternal Grandfather     Heart attack Paternal Grandfather         poor diet    Melanoma Neg Hx      Allergy:  Review of patient's allergies indicates:   Allergen Reactions    Shellfish containing products      Outpatient Encounter Medications as of 2022   Medication Sig Dispense Refill    diazePAM (VALIUM) 5 MG tablet Take 1 tablet (5 mg total) by mouth as needed for Anxiety. Take all 3 tablets orally 30 mins before the procedure 3 tablet 0    finasteride (PROSCAR) 5 mg tablet Take 1 tablet (5 mg total) by mouth once daily. 30 tablet 11    multivitamin capsule Take 1 capsule by mouth once daily.       pravastatin (PRAVACHOL) 20 MG tablet Take 1 tablet (20 mg total) by mouth once daily. 90 tablet 3    acyclovir (ZOVIRAX) 400 MG tablet Take 1 tablet (400 mg total) by mouth 2 (two) times daily. Take it for 5 days.  Refills as needed. for 5 days 10 tablet 3    betamethasone dipropionate (DIPROLENE) 0.05 % ointment Apply topically 2 (two) times daily. 15 g 0     No facility-administered encounter medications on file as of 7/1/2022.     Review of Systems   ROS  Physical Exam     Vitals:    07/01/22 1001   BP: 104/66   Pulse: (!) 48     Physical Exam  Genitalia:  Scrotum: no rash.  Has a skin tag on the right scrotum ( no change for the 10 years)  Normal symmetric epididymis without masses  Normal vas palpated  Normal size, symmetric testicles with no masses   Normal urethral meatus with no discharge  Normal circumcised penis with mild red area on the left side of the glands.  Almost healed flake area, which he pointed out as blisters in the beginning.         LABS:  Lab Results   Component Value Date    PSA 7.9 (H) 02/14/2022    PSA 5.5 (H) 07/18/2018    PSADIAG 4.1 (H) 04/14/2016     Results for orders placed or performed in visit on 04/14/16   Prostate Specific Antigen, Diagnostic   Result Value Ref Range    PSA Diagnostic 4.1 (H) 0.00 - 4.00 ng/mL     Lab Results   Component Value Date    CREATININE 0.9 02/14/2022    CREATININE 0.8 12/27/2019    CREATININE 1.0 07/18/2018     No results found for this or any previous visit.  No results found for: LABURIN  Hemoglobin A1C   Date Value Ref Range Status   02/14/2022 5.1 4.0 - 5.6 % Final     Comment:     ADA Screening Guidelines:  5.7-6.4%  Consistent with prediabetes  >or=6.5%  Consistent with diabetes    High levels of fetal hemoglobin interfere with the HbA1C  assay. Heterozygous hemoglobin variants (HbS, HgC, etc)do  not significantly interfere with this assay.   However, presence of multiple variants may affect accuracy.     12/27/2019 5.2 4.0 - 5.6 % Final      Comment:     ADA Screening Guidelines:  5.7-6.4%  Consistent with prediabetes  >or=6.5%  Consistent with diabetes  High levels of fetal hemoglobin interfere with the HbA1C  assay. Heterozygous hemoglobin variants (HbS, HgC, etc)do  not significantly interfere with this assay.   However, presence of multiple variants may affect accuracy.         Radiology:    Assessment and Plan:  Dandy was seen today for other.    Diagnoses and all orders for this visit:    Penile lesion  -     Herpes simplex type 1&2 IgG,Herpes titer; Future  -     betamethasone dipropionate (DIPROLENE) 0.05 % ointment; Apply topically 2 (two) times daily.    Herpes genitalis in men  -     acyclovir (ZOVIRAX) 400 MG tablet; Take 1 tablet (400 mg total) by mouth 2 (two) times daily. Take it for 5 days.  Refills as needed. for 5 days    Elevated PSA  -     Prostate Specific Antigen, Diagnostic; Future    possible herpes lesion of the penis, although he does not have a risk factor.  Once he gets the result of herpes test, he can start Acyclovir as given.    Otherwise, he can try Betamethasone ointment over the penile lesion and see how it works.    Follow-up:  Follow up in about 8 weeks (around 8/26/2022), or psa.

## 2022-07-01 NOTE — TELEPHONE ENCOUNTER
C/o cut on the penis when he had a cysto back in 5/19/22.    He is still bothered by it.  I asked him to come in for examination.  Please add him on to my schedule today.

## 2022-07-01 NOTE — PATIENT INSTRUCTIONS
Lab Results   Component Value Date    PSA 7.9 (H) 02/14/2022    PSA 5.5 (H) 07/18/2018    PSADIAG 4.1 (H) 04/14/2016

## 2022-07-06 ENCOUNTER — TELEPHONE (OUTPATIENT)
Dept: UROLOGY | Facility: CLINIC | Age: 62
End: 2022-07-06
Payer: COMMERCIAL

## 2022-08-18 ENCOUNTER — LAB VISIT (OUTPATIENT)
Dept: LAB | Facility: OTHER | Age: 62
End: 2022-08-18
Attending: STUDENT IN AN ORGANIZED HEALTH CARE EDUCATION/TRAINING PROGRAM
Payer: COMMERCIAL

## 2022-08-18 ENCOUNTER — OFFICE VISIT (OUTPATIENT)
Dept: INTERNAL MEDICINE | Facility: CLINIC | Age: 62
End: 2022-08-18
Payer: COMMERCIAL

## 2022-08-18 VITALS
WEIGHT: 195.75 LBS | BODY MASS INDEX: 26.51 KG/M2 | SYSTOLIC BLOOD PRESSURE: 142 MMHG | HEIGHT: 72 IN | HEART RATE: 64 BPM | OXYGEN SATURATION: 96 % | DIASTOLIC BLOOD PRESSURE: 78 MMHG

## 2022-08-18 DIAGNOSIS — R97.20 ELEVATED PSA: ICD-10-CM

## 2022-08-18 DIAGNOSIS — E78.49 OTHER HYPERLIPIDEMIA: Primary | ICD-10-CM

## 2022-08-18 DIAGNOSIS — G47.33 OSA (OBSTRUCTIVE SLEEP APNEA): ICD-10-CM

## 2022-08-18 DIAGNOSIS — D69.2 SENILE PURPURA: ICD-10-CM

## 2022-08-18 DIAGNOSIS — N40.1 ENLARGED PROSTATE WITH URINARY OBSTRUCTION: ICD-10-CM

## 2022-08-18 DIAGNOSIS — N13.8 ENLARGED PROSTATE WITH URINARY OBSTRUCTION: ICD-10-CM

## 2022-08-18 LAB — COMPLEXED PSA SERPL-MCNC: 4.8 NG/ML (ref 0–4)

## 2022-08-18 PROCEDURE — 1159F PR MEDICATION LIST DOCUMENTED IN MEDICAL RECORD: ICD-10-PCS | Mod: CPTII,S$GLB,, | Performed by: STUDENT IN AN ORGANIZED HEALTH CARE EDUCATION/TRAINING PROGRAM

## 2022-08-18 PROCEDURE — 36415 COLL VENOUS BLD VENIPUNCTURE: CPT | Performed by: UROLOGY

## 2022-08-18 PROCEDURE — 3077F PR MOST RECENT SYSTOLIC BLOOD PRESSURE >= 140 MM HG: ICD-10-PCS | Mod: CPTII,S$GLB,, | Performed by: STUDENT IN AN ORGANIZED HEALTH CARE EDUCATION/TRAINING PROGRAM

## 2022-08-18 PROCEDURE — 3044F HG A1C LEVEL LT 7.0%: CPT | Mod: CPTII,S$GLB,, | Performed by: STUDENT IN AN ORGANIZED HEALTH CARE EDUCATION/TRAINING PROGRAM

## 2022-08-18 PROCEDURE — 3044F PR MOST RECENT HEMOGLOBIN A1C LEVEL <7.0%: ICD-10-PCS | Mod: CPTII,S$GLB,, | Performed by: STUDENT IN AN ORGANIZED HEALTH CARE EDUCATION/TRAINING PROGRAM

## 2022-08-18 PROCEDURE — 3077F SYST BP >= 140 MM HG: CPT | Mod: CPTII,S$GLB,, | Performed by: STUDENT IN AN ORGANIZED HEALTH CARE EDUCATION/TRAINING PROGRAM

## 2022-08-18 PROCEDURE — 99215 OFFICE O/P EST HI 40 MIN: CPT | Mod: S$GLB,,, | Performed by: STUDENT IN AN ORGANIZED HEALTH CARE EDUCATION/TRAINING PROGRAM

## 2022-08-18 PROCEDURE — 99999 PR PBB SHADOW E&M-EST. PATIENT-LVL III: ICD-10-PCS | Mod: PBBFAC,,, | Performed by: STUDENT IN AN ORGANIZED HEALTH CARE EDUCATION/TRAINING PROGRAM

## 2022-08-18 PROCEDURE — 3008F BODY MASS INDEX DOCD: CPT | Mod: CPTII,S$GLB,, | Performed by: STUDENT IN AN ORGANIZED HEALTH CARE EDUCATION/TRAINING PROGRAM

## 2022-08-18 PROCEDURE — 84153 ASSAY OF PSA TOTAL: CPT | Performed by: UROLOGY

## 2022-08-18 PROCEDURE — 3078F PR MOST RECENT DIASTOLIC BLOOD PRESSURE < 80 MM HG: ICD-10-PCS | Mod: CPTII,S$GLB,, | Performed by: STUDENT IN AN ORGANIZED HEALTH CARE EDUCATION/TRAINING PROGRAM

## 2022-08-18 PROCEDURE — 99999 PR PBB SHADOW E&M-EST. PATIENT-LVL III: CPT | Mod: PBBFAC,,, | Performed by: STUDENT IN AN ORGANIZED HEALTH CARE EDUCATION/TRAINING PROGRAM

## 2022-08-18 PROCEDURE — 3008F PR BODY MASS INDEX (BMI) DOCUMENTED: ICD-10-PCS | Mod: CPTII,S$GLB,, | Performed by: STUDENT IN AN ORGANIZED HEALTH CARE EDUCATION/TRAINING PROGRAM

## 2022-08-18 PROCEDURE — 1159F MED LIST DOCD IN RCRD: CPT | Mod: CPTII,S$GLB,, | Performed by: STUDENT IN AN ORGANIZED HEALTH CARE EDUCATION/TRAINING PROGRAM

## 2022-08-18 PROCEDURE — 3078F DIAST BP <80 MM HG: CPT | Mod: CPTII,S$GLB,, | Performed by: STUDENT IN AN ORGANIZED HEALTH CARE EDUCATION/TRAINING PROGRAM

## 2022-08-18 PROCEDURE — 99215 PR OFFICE/OUTPT VISIT, EST, LEVL V, 40-54 MIN: ICD-10-PCS | Mod: S$GLB,,, | Performed by: STUDENT IN AN ORGANIZED HEALTH CARE EDUCATION/TRAINING PROGRAM

## 2022-08-18 NOTE — PROGRESS NOTES
[facility]    Subjective:       Patient ID: Dandy Gale is a 61 y.o. male.    Chief Complaint: Follow-up      History was obtained from the patient and supplemented through chart review.  This patient is known to me.     HPI:    Patient is a 61 y.o. male who presents for f/u of of HLD, prostate concerns    Elevated BP today  Not usual  Advised pt to get home BP cuff and monitor, given hand out    HLD  Doing well on pravastatin   Will recheck on annual labs Feb Smoked during the 20's     Elevated PSA  BPH  Screening PSA 5.5 7/18/2018, -> 7.9 2/14/2022   following with Dr. Rhodes  Was advised to give up bladder irritants  Diagnostic PSA 4.1 2018, -> 4.8 8/18/2022    Sleep apnea  Lost weight intentionally, but has regained  Using CPAP  Plans to re-initiate exercise, self-care  Wt Readings from Last 15 Encounters:   08/18/22 88.8 kg (195 lb 12.3 oz)   07/01/22 88.9 kg (196 lb)   05/19/22 89.1 kg (196 lb 8 oz)   04/26/22 86.8 kg (191 lb 5.8 oz)   03/15/22 85.9 kg (189 lb 6 oz)   02/14/22 81.4 kg (179 lb 7.3 oz)   02/12/20 83.9 kg (185 lb)   02/05/20 87.6 kg (193 lb 2 oz)   01/07/20 87 kg (191 lb 11 oz)   11/15/18 87.2 kg (192 lb 3.9 oz)   11/01/18 84 kg (185 lb 3 oz)   07/17/18 83.9 kg (185 lb)   04/14/16 82.3 kg (181 lb 7 oz)   10/31/13 77.1 kg (170 lb)   10/05/13 82.6 kg (182 lb)     Exercise with long walks in the past  Stairs are fine    Restrictive diet for health reasons in the past     Follows with derm  S/p multiple skin cancer removal    Medical History  Past Medical History:   Diagnosis Date    Fever blister 1/1/1985    Not recently    High blood cholesterol     Joint pain 1/1/2020    Probably some arthritis    Squamous cell carcinoma 2/2016    LEFT MID FOREARM:       Review of Systems   Constitutional: Negative for activity change and unexpected weight change.   HENT: Negative for hearing loss, rhinorrhea and trouble swallowing.    Eyes: Positive for visual disturbance. Negative for  discharge.   Respiratory: Negative for chest tightness and wheezing.    Cardiovascular: Negative for chest pain and palpitations.   Gastrointestinal: Negative for blood in stool, constipation, diarrhea and vomiting.   Endocrine: Positive for polyuria. Negative for polydipsia.   Genitourinary: Positive for difficulty urinating and urgency. Negative for hematuria.   Musculoskeletal: Negative for arthralgias, joint swelling and neck pain.   Neurological: Negative for weakness and headaches.   Psychiatric/Behavioral: Positive for dysphoric mood. Negative for confusion.         Surgical hx, family hx, social hx   Have been reviewed      Current Outpatient Medications:     betamethasone dipropionate (DIPROLENE) 0.05 % ointment, Apply topically 2 (two) times daily., Disp: 15 g, Rfl: 0    finasteride (PROSCAR) 5 mg tablet, Take 1 tablet (5 mg total) by mouth once daily., Disp: 30 tablet, Rfl: 11    multivitamin capsule, Take 1 capsule by mouth once daily., Disp: , Rfl:     pravastatin (PRAVACHOL) 20 MG tablet, Take 1 tablet (20 mg total) by mouth once daily., Disp: 90 tablet, Rfl: 3    acyclovir (ZOVIRAX) 400 MG tablet, Take 1 tablet (400 mg total) by mouth 2 (two) times daily. Take it for 5 days. Refills as needed. for 5 days, Disp: 10 tablet, Rfl: 3    diazePAM (VALIUM) 5 MG tablet, Take 1 tablet (5 mg total) by mouth as needed for Anxiety. Take all 3 tablets orally 30 mins before the procedure (Patient not taking: Reported on 8/18/2022), Disp: 3 tablet, Rfl: 0    Objective:        Body mass index is 26.55 kg/m².  Vitals:    08/18/22 1451   BP: (!) 142/78   Pulse: 64   SpO2: 96%   Weight: 88.8 kg (195 lb 12.3 oz)   Height: 6' (1.829 m)   PainSc: 0-No pain     Physical Exam  Vitals and nursing note reviewed.   Constitutional:       General: He is not in acute distress.     Appearance: Normal appearance. He is not ill-appearing.   HENT:      Head: Normocephalic and atraumatic.      Nose:      Comments: Wearing a  mask  Eyes:      General: No scleral icterus.  Cardiovascular:      Rate and Rhythm: Normal rate and regular rhythm.      Heart sounds: Normal heart sounds.   Pulmonary:      Effort: Pulmonary effort is normal.   Abdominal:      General: There is no distension.   Musculoskeletal:         General: No deformity.      Cervical back: Normal range of motion.   Skin:     General: Skin is warm and dry.      Comments: Thin papery skin   Neurological:      Mental Status: He is alert and oriented to person, place, and time.   Psychiatric:         Behavior: Behavior normal.           Lab Results   Component Value Date    WBC 5.25 02/14/2022    HGB 15.7 02/14/2022    HCT 45.6 02/14/2022     02/14/2022    CHOL 194 02/14/2022    TRIG 152 (H) 02/14/2022    HDL 55 02/14/2022    ALT 14 02/14/2022    AST 20 02/14/2022     02/14/2022    K 4.0 02/14/2022     02/14/2022    CREATININE 0.9 02/14/2022    BUN 9 02/14/2022    CO2 31 (H) 02/14/2022    TSH 1.283 02/14/2022    PSA 7.9 (H) 02/14/2022    INR 1.0 02/28/2014    HGBA1C 5.1 02/14/2022       The 10-year ASCVD risk score (Marquettewilfred SANON Jr., et al., 2013) is: 11%    Values used to calculate the score:      Age: 61 years      Sex: Male      Is Non- : No      Diabetic: No      Tobacco smoker: No      Systolic Blood Pressure: 142 mmHg      Is BP treated: No      HDL Cholesterol: 55 mg/dL      Total Cholesterol: 194 mg/dL    On statin    (Imaging have been independently reviewed)      Assessment:         1. Other hyperlipidemia    2. Senile purpura    3. Elevated PSA    4. Enlarged prostate with urinary obstruction    5. ALONDRA (obstructive sleep apnea)          Plan:     Dandy was seen today for follow-up.    Diagnoses and all orders for this visit:    Other hyperlipidemia    Senile purpura    Elevated PSA    Enlarged prostate with urinary obstruction    ALONDRA (obstructive sleep apnea)        Health Maintenance  - Colon Ca Screen: repeat due 5 years, hx of  polyps, due 2/2025  - Immunizations: covid vaccinated with booster    Tob  - AAA screen (ever >100 cigs in lifetime) neg 2/2022    Follow up in about 6 months (around 2/18/2023) for annual or sooner if needed.      42 min were used in chart review, evaluation and counseling of patient re: PSA, BP, weight, dysthymic mood, documentation and review of results on same day of service     All medications were reviewed including potential side effects and risks/benefits.  Pt was counseled to call back if anything worsens or if questions arise.    Lawrence Denson MD  Family Medicine  [facility]  [address] [address]  Phone [phone]  Fax [phone]

## 2022-08-19 ENCOUNTER — PATIENT MESSAGE (OUTPATIENT)
Dept: INTERNAL MEDICINE | Facility: CLINIC | Age: 62
End: 2022-08-19
Payer: COMMERCIAL

## 2022-09-02 ENCOUNTER — OFFICE VISIT (OUTPATIENT)
Dept: UROLOGY | Facility: CLINIC | Age: 62
End: 2022-09-02
Payer: COMMERCIAL

## 2022-09-02 VITALS
SYSTOLIC BLOOD PRESSURE: 114 MMHG | BODY MASS INDEX: 25.77 KG/M2 | WEIGHT: 190.25 LBS | HEART RATE: 62 BPM | HEIGHT: 72 IN | DIASTOLIC BLOOD PRESSURE: 60 MMHG

## 2022-09-02 DIAGNOSIS — R35.1 BPH ASSOCIATED WITH NOCTURIA: Primary | ICD-10-CM

## 2022-09-02 DIAGNOSIS — R35.1 NOCTURIA: ICD-10-CM

## 2022-09-02 DIAGNOSIS — N40.1 BPH ASSOCIATED WITH NOCTURIA: Primary | ICD-10-CM

## 2022-09-02 DIAGNOSIS — R97.20 ELEVATED PSA: ICD-10-CM

## 2022-09-02 DIAGNOSIS — N40.1 BPH WITH URINARY OBSTRUCTION: ICD-10-CM

## 2022-09-02 DIAGNOSIS — N13.8 BPH WITH URINARY OBSTRUCTION: ICD-10-CM

## 2022-09-02 PROCEDURE — 1159F MED LIST DOCD IN RCRD: CPT | Mod: CPTII,S$GLB,, | Performed by: UROLOGY

## 2022-09-02 PROCEDURE — 3008F PR BODY MASS INDEX (BMI) DOCUMENTED: ICD-10-PCS | Mod: CPTII,S$GLB,, | Performed by: UROLOGY

## 2022-09-02 PROCEDURE — 3044F PR MOST RECENT HEMOGLOBIN A1C LEVEL <7.0%: ICD-10-PCS | Mod: CPTII,S$GLB,, | Performed by: UROLOGY

## 2022-09-02 PROCEDURE — 3078F DIAST BP <80 MM HG: CPT | Mod: CPTII,S$GLB,, | Performed by: UROLOGY

## 2022-09-02 PROCEDURE — 99214 OFFICE O/P EST MOD 30 MIN: CPT | Mod: S$GLB,,, | Performed by: UROLOGY

## 2022-09-02 PROCEDURE — 3078F PR MOST RECENT DIASTOLIC BLOOD PRESSURE < 80 MM HG: ICD-10-PCS | Mod: CPTII,S$GLB,, | Performed by: UROLOGY

## 2022-09-02 PROCEDURE — 99999 PR PBB SHADOW E&M-EST. PATIENT-LVL III: CPT | Mod: PBBFAC,,, | Performed by: UROLOGY

## 2022-09-02 PROCEDURE — 3044F HG A1C LEVEL LT 7.0%: CPT | Mod: CPTII,S$GLB,, | Performed by: UROLOGY

## 2022-09-02 PROCEDURE — 1159F PR MEDICATION LIST DOCUMENTED IN MEDICAL RECORD: ICD-10-PCS | Mod: CPTII,S$GLB,, | Performed by: UROLOGY

## 2022-09-02 PROCEDURE — 3074F SYST BP LT 130 MM HG: CPT | Mod: CPTII,S$GLB,, | Performed by: UROLOGY

## 2022-09-02 PROCEDURE — 3008F BODY MASS INDEX DOCD: CPT | Mod: CPTII,S$GLB,, | Performed by: UROLOGY

## 2022-09-02 PROCEDURE — 99999 PR PBB SHADOW E&M-EST. PATIENT-LVL III: ICD-10-PCS | Mod: PBBFAC,,, | Performed by: UROLOGY

## 2022-09-02 PROCEDURE — 99214 PR OFFICE/OUTPT VISIT, EST, LEVL IV, 30-39 MIN: ICD-10-PCS | Mod: S$GLB,,, | Performed by: UROLOGY

## 2022-09-02 PROCEDURE — 3074F PR MOST RECENT SYSTOLIC BLOOD PRESSURE < 130 MM HG: ICD-10-PCS | Mod: CPTII,S$GLB,, | Performed by: UROLOGY

## 2022-09-02 RX ORDER — FINASTERIDE 5 MG/1
5 TABLET, FILM COATED ORAL DAILY
Qty: 90 TABLET | Refills: 3 | Status: SHIPPED | OUTPATIENT
Start: 2022-09-02 | End: 2023-09-02

## 2022-09-02 RX ORDER — ALFUZOSIN HYDROCHLORIDE 10 MG/1
10 TABLET, EXTENDED RELEASE ORAL
Qty: 90 TABLET | Refills: 3 | Status: SHIPPED | OUTPATIENT
Start: 2022-09-02 | End: 2023-09-05

## 2022-09-02 NOTE — PROGRESS NOTES
CC: elevated PSA, hx of genital herpes    HPI;    Dandy Gale is a 61 y.o. man who is here for the evaluation of Elevated PSA (Rtc with psa )    His PCP, Lawrence Denson MD   He responded well to Acyclovir which I have for suspected genital herpes.  Has been on finasteride since he had a negative prostate biopsy.  Was treated with abx for 1 month following prostate biopsy revealing chronic prostatitis.  Dictation #1  MRN:[id number]  CSN:[id number]   He developed painful penile lesion, that he has developed since his last visit me for cysto and Uronav bx of prostate on 5/19/22.  It is painful and started as blisters, then slow healing ulcer type.  Still has red lesion on the gland of the penis, left side.  He has no prior hx of STD.    He has LUTS. He has urgency, frequency 10+, incomplete voiding (he does double void), intermittency, nocturia x4 until he started a CPAP for ALONDRA. Now 1x per night.  Consumes an alcoholic beverage 3-4 nights a week which causes urgency.     He works at a  shop.         Procedure Date:  05/19/2022  Procedure:   UroNav MRI/US fusion biopsy of the prostate                                                                     Transrectal Ultrasound of the Prostate                                       Transrectal Ultrasound Guided Prostate Biopsy                                - With Pudendal Nerve Block         Lab Results   Component Value Date     PSA 7.9 (H) 02/14/2022     PSADIAG 4.1 (H) 04/14/2016             - Volume:85 gm.           PSA Density: 0.09                                                         yes hypoechoic area in the left prostate     MRI of prostate 4/22/22  Previous biopsy: No carcinoma identified.  11/15/2018  PSA: 7.9 ng/mL 02/14/2022   Prior therapy: None   Prostate: 5.6 x 5.6 x 6.4 cm corresponding to a computed volume of 84.52 cc.  Peripheral zone:   Lesion (YANIRA) 1   Location: Side: left; Region: base; Zone: posterior peripheral zone  laterally  Greatest dimension: 1.4 cm   T2-WI: Circumscribed, homogeneous moderate hypointense focus/mass confined to prostate and <1.5 cm in greatest dimension, score 4.  DWI/ADC: Focal markedly hypointense on ADC and markedly hyperintense on high b-value DWI; <1.5 cm in greatest dimension, score 4.  DCE: Positive  Extraprostatic extension: Negative   PI-RADS assessment category: 4  Transitional zone: Benign prostatic hyperplasia without focal suspicious abnormality, score 2.  Neurovascular bundle: Normal appearance.  Seminal vesicles: Normal appearance.   Adjacent Organ Involvement: No evidence for urinary bladder or rectal invasion.  Lymphadenopathy: None.  Other Findings: Fat containing right inguinal hernia.  Impression:  1. Suspicious lesion in the left base peripheral zone.  No evidence for extraprostatic extension.  Overall Assessment: PI-RADS 4 - High (clinically significant cancer is likely to be present)    Procedure:  Male Diagnostic Cystourethroscopy  Pre-op diagnosis: enlarged prostate with urinary obstruction  Findings:  Urethra:  Normal urethra.   Sphincter: competent.  Prostate: Estimated Length Prostatic Urethra: 7 cm with trilobar obstruction, moderate size intravesical lobe enlargement  Bladder neck: patent with no stricture  Bladder:  Normal bladder. 2+ trabeculation. No stone or tumor seen.  Normal ureteral orifices bilaterally.   Moderate trabeculation.       Conclusion:  1. Enlarged prostate with obstruction  Will need bipolar TURP.  Await TRUS bx result.    Pathology  A.   PROSTATE, LEFT APEX, NEEDLE BIOPSY:          Benign prostatic tissue with chronic inflammation.  (R97.20)   B.   PROSTATE, LEFT MID, NEEDLE BIOPSY:          Benign prostatic tissue with chronic inflammation.  (R97.20)   C.   PROSTATE, LEFT BASE, NEEDLE BIOPSY:          Benign prostatic tissue with chronic inflammation.  (R97.20)   D.   PROSTATE, RIGHT APEX, NEEDLE BIOPSY:          Benign prostatic tissue with chronic  inflammation.  (R97.20)   E.   PROSTATE, RIGHT MID, NEEDLE BIOPSY:          Benign prostatic tissue with chronic inflammation.  (R97.20)   F.   PROSTATE, RIGHT BASE, NEEDLE BIOPSY:          Benign prostatic tissue with chronic inflammation.  (R97.20)   G.   TISSUE1          TARGET LESION, BIOPSY:          Benign prostatic tissue with chronic inflammation.  (R97.20)       Past Medical History:   Diagnosis Date    Fever blister 1985    Not recently    High blood cholesterol     Joint pain 2020    Probably some arthritis    Squamous cell carcinoma 2016    LEFT MID FOREARM:     Past Surgical History:   Procedure Laterality Date    COLONOSCOPY N/A 2020    Procedure: COLONOSCOPY;  Surgeon: Paulette George MD;  Location: [facility] ([address] [place] [address]);  Service: Endoscopy;  Laterality: N/A;    FRACTURE SURGERY      l shoulder plate       l thumb plate insertion      SKIN BIOPSY  1995    Should be in chart     Social History     Tobacco Use    Smoking status: Former     Packs/day: 1.00     Years: 10.00     Pack years: 10.00     Types: Cigarettes     Start date: 1980     Quit date: 1990     Years since quittin.6    Smokeless tobacco: Former     Quit date: 1990   Substance Use Topics    Alcohol use: Yes     Alcohol/week: 2.0 standard drinks     Types: 2 Glasses of wine per week    Drug use: Not Currently     Family History   Problem Relation Age of Onset    Prostate cancer Father     Hearing loss Father     Hypertension Mother     Depression Mother     Cancer Paternal Grandmother         liver? Dx later stage    Heart disease Maternal Grandmother     Heart disease Maternal Grandfather     Heart attack Paternal Grandfather         poor diet    Melanoma Neg Hx      Allergy:  Review of patient's allergies indicates:   Allergen Reactions    Shellfish containing products      Outpatient Encounter Medications as of 2022   Medication Sig Dispense Refill    acyclovir (ZOVIRAX) 400 MG tablet Take 1  tablet (400 mg total) by mouth 2 (two) times daily. Take it for 5 days.  Refills as needed. for 5 days 10 tablet 3    alfuzosin (UROXATRAL) 10 mg Tb24 Take 1 tablet (10 mg total) by mouth daily with breakfast. 90 tablet 3    betamethasone dipropionate (DIPROLENE) 0.05 % ointment Apply topically 2 (two) times daily. 15 g 0    diazePAM (VALIUM) 5 MG tablet Take 1 tablet (5 mg total) by mouth as needed for Anxiety. Take all 3 tablets orally 30 mins before the procedure (Patient not taking: No sig reported) 3 tablet 0    finasteride (PROSCAR) 5 mg tablet Take 1 tablet (5 mg total) by mouth once daily. 90 tablet 3    multivitamin capsule Take 1 capsule by mouth once daily.      pravastatin (PRAVACHOL) 20 MG tablet Take 1 tablet (20 mg total) by mouth once daily. 90 tablet 3    [DISCONTINUED] finasteride (PROSCAR) 5 mg tablet Take 1 tablet (5 mg total) by mouth once daily. 30 tablet 11     No facility-administered encounter medications on file as of 9/2/2022.     Review of Systems   ROS  Physical Exam     Vitals:    09/02/22 0838   BP: 114/60   Pulse: 62     Physical Exam  Genitalia:  Scrotum: no rash.  Has a skin tag on the right scrotum ( no change for the 10 years)  Normal symmetric epididymis without masses  Normal vas palpated  Normal size, symmetric testicles with no masses   Normal urethral meatus with no discharge  Normal circumcised penis with mild red area on the left side of the glands.  Almost healed flake area, which he pointed out as blisters in the beginning.         LABS:  Lab Results   Component Value Date    PSA 7.9 (H) 02/14/2022    PSA 5.5 (H) 07/18/2018    PSADIAG 4.8 (H) 08/18/2022    PSADIAG 4.1 (H) 04/14/2016     Results for orders placed or performed in visit on 08/18/22   Prostate Specific Antigen, Diagnostic   Result Value Ref Range    PSA Diagnostic 4.8 (H) 0.00 - 4.00 ng/mL   Results for orders placed or performed in visit on 04/14/16   Prostate Specific Antigen, Diagnostic   Result Value Ref  Range    PSA Diagnostic 4.1 (H) 0.00 - 4.00 ng/mL     Lab Results   Component Value Date    CREATININE 0.9 02/14/2022    CREATININE 0.8 12/27/2019    CREATININE 1.0 07/18/2018     No results found for this or any previous visit.  No results found for: LABURIN  Hemoglobin A1C   Date Value Ref Range Status   02/14/2022 5.1 4.0 - 5.6 % Final     Comment:     ADA Screening Guidelines:  5.7-6.4%  Consistent with prediabetes  >or=6.5%  Consistent with diabetes    High levels of fetal hemoglobin interfere with the HbA1C  assay. Heterozygous hemoglobin variants (HbS, HgC, etc)do  not significantly interfere with this assay.   However, presence of multiple variants may affect accuracy.     12/27/2019 5.2 4.0 - 5.6 % Final     Comment:     ADA Screening Guidelines:  5.7-6.4%  Consistent with prediabetes  >or=6.5%  Consistent with diabetes  High levels of fetal hemoglobin interfere with the HbA1C  assay. Heterozygous hemoglobin variants (HbS, HgC, etc)do  not significantly interfere with this assay.   However, presence of multiple variants may affect accuracy.         Radiology:    Assessment and Plan:  Dandy was seen today for elevated psa.    Diagnoses and all orders for this visit:    BPH associated with nocturia  -     alfuzosin (UROXATRAL) 10 mg Tb24; Take 1 tablet (10 mg total) by mouth daily with breakfast.    Nocturia    BPH with urinary obstruction  -     finasteride (PROSCAR) 5 mg tablet; Take 1 tablet (5 mg total) by mouth once daily.    Elevated PSA  -     Prostate Specific Antigen, Diagnostic; Future    His PSA is coming down after abx and finasteride.  Will continue to follow up with serial PSA.    Will add Uroxatral to improve his weak urine flow.  I spent 25 minutes with the patient of which more than half was spent in direct consultation with the patient in regards to our treatment and plan.       Follow-up:  Follow up in about 6 months (around 3/2/2023) for PSA.

## 2022-09-02 NOTE — PATIENT INSTRUCTIONS
Lab Results   Component Value Date    PSA 7.9 (H) 02/14/2022    PSA 5.5 (H) 07/18/2018    PSADIAG 4.8 (H) 08/18/2022    PSADIAG 4.1 (H) 04/14/2016

## 2022-10-16 ENCOUNTER — PATIENT MESSAGE (OUTPATIENT)
Dept: INTERNAL MEDICINE | Facility: CLINIC | Age: 62
End: 2022-10-16
Payer: COMMERCIAL

## 2022-10-16 DIAGNOSIS — I10 PRIMARY HYPERTENSION: Primary | ICD-10-CM

## 2022-10-17 ENCOUNTER — PATIENT MESSAGE (OUTPATIENT)
Dept: INTERNAL MEDICINE | Facility: CLINIC | Age: 62
End: 2022-10-17
Payer: COMMERCIAL

## 2022-10-17 RX ORDER — AMLODIPINE BESYLATE 2.5 MG/1
2.5 TABLET ORAL DAILY
Qty: 30 TABLET | Refills: 3 | Status: SHIPPED | OUTPATIENT
Start: 2022-10-17 | End: 2022-11-04

## 2022-10-18 ENCOUNTER — PATIENT MESSAGE (OUTPATIENT)
Dept: INTERNAL MEDICINE | Facility: CLINIC | Age: 62
End: 2022-10-18
Payer: COMMERCIAL

## 2022-10-26 ENCOUNTER — IMMUNIZATION (OUTPATIENT)
Dept: INTERNAL MEDICINE | Facility: CLINIC | Age: 62
End: 2022-10-26
Payer: COMMERCIAL

## 2022-10-26 DIAGNOSIS — Z23 NEED FOR VACCINATION: Primary | ICD-10-CM

## 2022-10-26 PROCEDURE — 91312 COVID-19, MRNA, LNP-S, BIVALENT BOOSTER, PF, 30 MCG/0.3 ML DOSE: ICD-10-PCS | Mod: S$GLB,,, | Performed by: INTERNAL MEDICINE

## 2022-10-26 PROCEDURE — 0124A COVID-19, MRNA, LNP-S, BIVALENT BOOSTER, PF, 30 MCG/0.3 ML DOSE: CPT | Mod: PBBFAC | Performed by: INTERNAL MEDICINE

## 2022-10-26 PROCEDURE — 91312 COVID-19, MRNA, LNP-S, BIVALENT BOOSTER, PF, 30 MCG/0.3 ML DOSE: CPT | Mod: S$GLB,,, | Performed by: INTERNAL MEDICINE

## 2022-11-03 ENCOUNTER — PATIENT MESSAGE (OUTPATIENT)
Dept: INTERNAL MEDICINE | Facility: CLINIC | Age: 62
End: 2022-11-03
Payer: COMMERCIAL

## 2022-11-03 NOTE — PROGRESS NOTES
"  [facility]    Subjective:       Patient ID: Dandy Gale is a 62 y.o. male.    Chief Complaint: Anxiety      History was obtained from the patient and supplemented through chart review.  This patient is known to me.     HPI:    Patient is a 62 y.o. male w/ pmhx of HLD, elevated PSA w/ BPH, ALONDRA presents for anxiety    Pt thinks he might have mood issue more with high variability, uses the term "manic"  Mom had severe manic depressiveness, would stay in beds for days; pt less so in the regard  Sometimes manic episodes for months with increased productivity and very much likes those periods  Sometimes lethargic, more distractions.    "I think I'm in a manic phase right now"  One day quick exchange of miata for truck, but otherwise not impulsive/destructive behavior  Chronic THC misuse; improved his functioning; Doing well with work  Sleeping less than usual, 7 hours now when usual is 9-10 hours  Current phase started 4-6 months ago  Concerned about lungs from THC  No SI, HI, AH/VH  "Constant state of uneasiness"  Strongly recommended psychiatry and given list of resources, urgent referral placed and recommended pt call for appt.    In recent weakness, more irritable, less filter  Asked family and coworkers to change their ways of intereacting with him    Elevated BP  Monitor at home; set up follow-up message in chart      Not addressed  HLD  Doing well on pravastatin   Will recheck on annual labs Feb    Smoked cigs during his 20's only but now frequent smoking of THC     Sleep apnea  Lost weight intentionally, but has regained  Using CPAP  Plans to re-initiate exercise, self-care  Wt Readings from Last 15 Encounters:   11/04/22 85.5 kg (188 lb 7.9 oz)   09/02/22 86.3 kg (190 lb 4.1 oz)   08/18/22 88.8 kg (195 lb 12.3 oz)   07/01/22 88.9 kg (196 lb)   05/19/22 89.1 kg (196 lb 8 oz)   04/26/22 86.8 kg (191 lb 5.8 oz)   03/15/22 85.9 kg (189 lb 6 oz)   02/14/22 81.4 kg (179 lb 7.3 oz)   02/12/20 " 83.9 kg (185 lb)   02/05/20 87.6 kg (193 lb 2 oz)   01/07/20 87 kg (191 lb 11 oz)   11/15/18 87.2 kg (192 lb 3.9 oz)   11/01/18 84 kg (185 lb 3 oz)   07/17/18 83.9 kg (185 lb)   04/14/16 82.3 kg (181 lb 7 oz)     Exercise with long walks in the past  Stairs are fine    Restrictive diet for health reasons in the past     Follows with derm  S/p multiple skin cancer removal    BPH Elevated PSA  Screening PSA 5.5 7/18/2018, -> 7.9 2/14/2022   following with Dr. Rhodes  Was advised to give up bladder irritants  Diagnostic PSA 4.1 2018, -> 4.8 8/18/2022    Medical History  Past Medical History:   Diagnosis Date    Fever blister 1/1/1985    Not recently    High blood cholesterol     Joint pain 1/1/2020    Probably some arthritis    Squamous cell carcinoma 2/2016    LEFT MID FOREARM:       Review of Systems   Constitutional:  Negative for activity change and unexpected weight change.   HENT:  Negative for hearing loss, rhinorrhea and trouble swallowing.    Eyes:  Negative for discharge.   Respiratory:  Negative for chest tightness and wheezing.    Cardiovascular:  Negative for chest pain and palpitations.   Gastrointestinal:  Negative for blood in stool, constipation, diarrhea and vomiting.   Endocrine: Negative for polydipsia.   Genitourinary:  Negative for hematuria.   Musculoskeletal:  Negative for arthralgias, joint swelling and neck pain.   Neurological:  Negative for weakness and headaches.   Psychiatric/Behavioral:  Positive for agitation and dysphoric mood. Negative for confusion. The patient is nervous/anxious.        Surgical hx, family hx, social hx   Have been reviewed      Current Outpatient Medications:     alfuzosin (UROXATRAL) 10 mg Tb24, Take 1 tablet (10 mg total) by mouth daily with breakfast., Disp: 90 tablet, Rfl: 3    betamethasone dipropionate (DIPROLENE) 0.05 % ointment, Apply topically 2 (two) times daily., Disp: 15 g, Rfl: 0    finasteride (PROSCAR) 5 mg tablet, Take 1 tablet (5 mg total) by mouth  once daily., Disp: 90 tablet, Rfl: 3    multivitamin capsule, Take 1 capsule by mouth once daily., Disp: , Rfl:     pravastatin (PRAVACHOL) 20 MG tablet, Take 1 tablet (20 mg total) by mouth once daily., Disp: 90 tablet, Rfl: 3    acyclovir (ZOVIRAX) 400 MG tablet, Take 1 tablet (400 mg total) by mouth 2 (two) times daily. Take it for 5 days. Refills as needed. for 5 days, Disp: 10 tablet, Rfl: 3    amLODIPine (NORVASC) 5 MG tablet, Take 1 tablet (5 mg total) by mouth once daily., Disp: 30 tablet, Rfl: 3    diazePAM (VALIUM) 5 MG tablet, Take 1 tablet (5 mg total) by mouth as needed for Anxiety. Take all 3 tablets orally 30 mins before the procedure (Patient not taking: Reported on 8/18/2022), Disp: 3 tablet, Rfl: 0    varicella-zoster gE-AS01B, PF, (SHINGRIX) 50 mcg/0.5 mL injection, Inject into the muscle., Disp: 1 each, Rfl: 0    Objective:        Body mass index is 25.56 kg/m².  Vitals:    11/04/22 0825   BP: 138/88   Pulse: 82   SpO2: 97%   Weight: 85.5 kg (188 lb 7.9 oz)   Height: 6' (1.829 m)   PainSc: 0-No pain       Physical Exam  Vitals and nursing note reviewed.   Constitutional:       General: He is not in acute distress.     Appearance: Normal appearance. He is not ill-appearing.   HENT:      Head: Normocephalic and atraumatic.   Eyes:      General: No scleral icterus.  Cardiovascular:      Rate and Rhythm: Normal rate and regular rhythm.      Heart sounds: Normal heart sounds.   Pulmonary:      Effort: Pulmonary effort is normal.   Abdominal:      General: There is no distension.   Musculoskeletal:         General: No deformity.      Cervical back: Normal range of motion.   Skin:     General: Skin is warm and dry.      Comments: Thin papery skin   Neurological:      Mental Status: He is alert and oriented to person, place, and time.   Psychiatric:         Behavior: Behavior normal.         Lab Results   Component Value Date    WBC 5.25 02/14/2022    HGB 15.7 02/14/2022    HCT 45.6 02/14/2022      02/14/2022    CHOL 194 02/14/2022    TRIG 152 (H) 02/14/2022    HDL 55 02/14/2022    ALT 14 02/14/2022    AST 20 02/14/2022     02/14/2022    K 4.0 02/14/2022     02/14/2022    CREATININE 0.9 02/14/2022    BUN 9 02/14/2022    CO2 31 (H) 02/14/2022    TSH 1.283 02/14/2022    PSA 7.9 (H) 02/14/2022    INR 1.0 02/28/2014    HGBA1C 5.1 02/14/2022       The 10-year ASCVD risk score (Courtney BOWERS, et al., 2019) is: 13.3%    Values used to calculate the score:      Age: 62 years      Sex: Male      Is Non- : No      Diabetic: No      Tobacco smoker: No      Systolic Blood Pressure: 138 mmHg      Is BP treated: Yes      HDL Cholesterol: 55 mg/dL      Total Cholesterol: 194 mg/dL    On statin    (Imaging have been independently reviewed)    Reviewed MRI prostate April 2022    Assessment:         1. Manic behavior    2. Primary hypertension    3. Tetrahydrocannabinol (THC) dependence            Plan:     Dandy was seen today for anxiety.    Diagnoses and all orders for this visit:    Manic behavior  -     Ambulatory referral/consult to Psychiatry; Future    Primary hypertension  -     amLODIPine (NORVASC) 5 MG tablet; Take 1 tablet (5 mg total) by mouth once daily.    Tetrahydrocannabinol (THC) dependence        Health Maintenance  - Colon Ca Screen: repeat due 5 years, hx of polyps, due 2/2025  - Immunizations: covid vaccinated with booster    Tob  - AAA screen (ever >100 cigs in lifetime) neg 2/2022    No follow-ups on file.      35 min were used in chart review, evaluation and counseling of patient mood, THC use, family history, role for psych and need to f/u, documentation and review of results on same day of service     All medications were reviewed including potential side effects and risks/benefits.  Pt was counseled to call back if anything worsens or if questions arise.    Lawrence Denson MD  Family Medicine  [facility]  [address]  [address]  Phone [phone]  Fax [phone]

## 2022-11-04 ENCOUNTER — OFFICE VISIT (OUTPATIENT)
Dept: INTERNAL MEDICINE | Facility: CLINIC | Age: 62
End: 2022-11-04
Payer: COMMERCIAL

## 2022-11-04 ENCOUNTER — PATIENT MESSAGE (OUTPATIENT)
Dept: INTERNAL MEDICINE | Facility: CLINIC | Age: 62
End: 2022-11-04

## 2022-11-04 VITALS
SYSTOLIC BLOOD PRESSURE: 138 MMHG | DIASTOLIC BLOOD PRESSURE: 88 MMHG | WEIGHT: 188.5 LBS | OXYGEN SATURATION: 97 % | HEART RATE: 82 BPM | BODY MASS INDEX: 25.53 KG/M2 | HEIGHT: 72 IN

## 2022-11-04 DIAGNOSIS — F12.20 TETRAHYDROCANNABINOL (THC) DEPENDENCE: ICD-10-CM

## 2022-11-04 DIAGNOSIS — I10 PRIMARY HYPERTENSION: ICD-10-CM

## 2022-11-04 DIAGNOSIS — F30.10 MANIC BEHAVIOR: Primary | ICD-10-CM

## 2022-11-04 PROCEDURE — 3008F BODY MASS INDEX DOCD: CPT | Mod: CPTII,S$GLB,, | Performed by: STUDENT IN AN ORGANIZED HEALTH CARE EDUCATION/TRAINING PROGRAM

## 2022-11-04 PROCEDURE — 3079F DIAST BP 80-89 MM HG: CPT | Mod: CPTII,S$GLB,, | Performed by: STUDENT IN AN ORGANIZED HEALTH CARE EDUCATION/TRAINING PROGRAM

## 2022-11-04 PROCEDURE — 99214 PR OFFICE/OUTPT VISIT, EST, LEVL IV, 30-39 MIN: ICD-10-PCS | Mod: S$GLB,,, | Performed by: STUDENT IN AN ORGANIZED HEALTH CARE EDUCATION/TRAINING PROGRAM

## 2022-11-04 PROCEDURE — 3008F PR BODY MASS INDEX (BMI) DOCUMENTED: ICD-10-PCS | Mod: CPTII,S$GLB,, | Performed by: STUDENT IN AN ORGANIZED HEALTH CARE EDUCATION/TRAINING PROGRAM

## 2022-11-04 PROCEDURE — 99214 OFFICE O/P EST MOD 30 MIN: CPT | Mod: S$GLB,,, | Performed by: STUDENT IN AN ORGANIZED HEALTH CARE EDUCATION/TRAINING PROGRAM

## 2022-11-04 PROCEDURE — 99999 PR PBB SHADOW E&M-EST. PATIENT-LVL IV: CPT | Mod: PBBFAC,,, | Performed by: STUDENT IN AN ORGANIZED HEALTH CARE EDUCATION/TRAINING PROGRAM

## 2022-11-04 PROCEDURE — 3044F HG A1C LEVEL LT 7.0%: CPT | Mod: CPTII,S$GLB,, | Performed by: STUDENT IN AN ORGANIZED HEALTH CARE EDUCATION/TRAINING PROGRAM

## 2022-11-04 PROCEDURE — 3075F SYST BP GE 130 - 139MM HG: CPT | Mod: CPTII,S$GLB,, | Performed by: STUDENT IN AN ORGANIZED HEALTH CARE EDUCATION/TRAINING PROGRAM

## 2022-11-04 PROCEDURE — 3075F PR MOST RECENT SYSTOLIC BLOOD PRESS GE 130-139MM HG: ICD-10-PCS | Mod: CPTII,S$GLB,, | Performed by: STUDENT IN AN ORGANIZED HEALTH CARE EDUCATION/TRAINING PROGRAM

## 2022-11-04 PROCEDURE — 1159F MED LIST DOCD IN RCRD: CPT | Mod: CPTII,S$GLB,, | Performed by: STUDENT IN AN ORGANIZED HEALTH CARE EDUCATION/TRAINING PROGRAM

## 2022-11-04 PROCEDURE — 3079F PR MOST RECENT DIASTOLIC BLOOD PRESSURE 80-89 MM HG: ICD-10-PCS | Mod: CPTII,S$GLB,, | Performed by: STUDENT IN AN ORGANIZED HEALTH CARE EDUCATION/TRAINING PROGRAM

## 2022-11-04 PROCEDURE — 1159F PR MEDICATION LIST DOCUMENTED IN MEDICAL RECORD: ICD-10-PCS | Mod: CPTII,S$GLB,, | Performed by: STUDENT IN AN ORGANIZED HEALTH CARE EDUCATION/TRAINING PROGRAM

## 2022-11-04 PROCEDURE — 3044F PR MOST RECENT HEMOGLOBIN A1C LEVEL <7.0%: ICD-10-PCS | Mod: CPTII,S$GLB,, | Performed by: STUDENT IN AN ORGANIZED HEALTH CARE EDUCATION/TRAINING PROGRAM

## 2022-11-04 PROCEDURE — 99999 PR PBB SHADOW E&M-EST. PATIENT-LVL IV: ICD-10-PCS | Mod: PBBFAC,,, | Performed by: STUDENT IN AN ORGANIZED HEALTH CARE EDUCATION/TRAINING PROGRAM

## 2022-11-04 RX ORDER — AMLODIPINE BESYLATE 5 MG/1
5 TABLET ORAL DAILY
Qty: 30 TABLET | Refills: 3 | Status: SHIPPED | OUTPATIENT
Start: 2022-11-04 | End: 2022-11-29

## 2022-11-04 NOTE — PATIENT INSTRUCTIONS
Call to make an appointment within [facility] for psychiatry   [phone]     [employer]    [facility] psychiatrists:   Paulette Griffin(psychiatrist) [address] [facility] [address] Phone: [phone]   Nirmal Cuadra (psychiatrist) [phone], [phone] [address] [place] [address]   Dr. Frederick Gabriel - [phone]   Dr. Deidre Matos - [phone]   Dr. Marisela Dill - [phone]   Dr. Flaco Bradley - [phone]     [facility]: [phone]     Therapy/Psychology:   You can try anyone of these number to see if your insurance is accepted or you would have to call your insurance.     Cognitive Behavioral Therapy (CBT) Center [facility]   Address: [place] [address]   Phone: [phone]   [email or web address]     [facility] [address], [address]   Phone: [phone]   You can email for an appointment at: [email or web address]     [facility]   [address] [place] [address] 300, [facility], [address]   [email or web address]   Dr. Ana Hennessy, [phone] or [email or web address]   Dr. Yoli Velasco, [phone] or [email or web address]     Joanne Chirinos LCSW (therapist) [phone]   [address] [facility]   Flor RICCIW (therapist) [phone]   [address]   Lawanda Solis LCSW (therapist) [phone]   [address] [place] [address]   STACI Cuadra         [facility]                    [phone]   Michel Bowen [phone] (therapist) [address] [facility]   Cole Christina (therapist) [phone]  [address] Maya Gordillo (therapist) [phone] [address]     Behavior Health Counseling [phone]   [facility] [address]     Employee Assistance Program (EAP)   Check through your employer's HR.     [facility] Behavioral Health (accepts medicaid)  [address]  [phone]  or [phone].[facility] ([address])    Online Therapist:      [email or web address]     Free Guided Meditations   Https://[email or web address]/audio   Https://www.[facility].org/vel/body.cfm?[email or web address]   https://health.[facility]/specialties/mindfulness/programs/mbsr/pages/audio.aspx

## 2022-11-05 ENCOUNTER — PATIENT MESSAGE (OUTPATIENT)
Dept: INTERNAL MEDICINE | Facility: CLINIC | Age: 62
End: 2022-11-05
Payer: COMMERCIAL

## 2022-11-05 PROBLEM — F12.20 TETRAHYDROCANNABINOL (THC) DEPENDENCE: Status: ACTIVE | Noted: 2022-11-05

## 2022-11-29 ENCOUNTER — PATIENT MESSAGE (OUTPATIENT)
Dept: INTERNAL MEDICINE | Facility: CLINIC | Age: 62
End: 2022-11-29
Payer: COMMERCIAL

## 2022-11-29 DIAGNOSIS — I10 PRIMARY HYPERTENSION: ICD-10-CM

## 2022-11-29 RX ORDER — AMLODIPINE BESYLATE 10 MG/1
5 TABLET ORAL DAILY
Qty: 90 TABLET | Refills: 3 | Status: SHIPPED | OUTPATIENT
Start: 2022-11-29 | End: 2023-11-29

## 2023-02-07 DIAGNOSIS — E78.49 OTHER HYPERLIPIDEMIA: ICD-10-CM

## 2023-02-07 RX ORDER — PRAVASTATIN SODIUM 20 MG/1
TABLET ORAL
Qty: 90 TABLET | Refills: 0 | Status: SHIPPED | OUTPATIENT
Start: 2023-02-07 | End: 2023-05-05

## 2023-02-07 NOTE — TELEPHONE ENCOUNTER
Refill Decision Note   Dandy Gale  is requesting a refill authorization.  Brief Assessment and Rationale for Refill:  Approve     Medication Therapy Plan:       Medication Reconciliation Completed: No   Comments:     Provider Staff:     Action is required for this patient.   Please see care gap opportunities below in Care Due Message.     Thanks!  [facility]     Appointments      Date Provider   Last Visit   11/4/2022 Lawrence Denson MD   Next Visit   Visit date not found Lawrence Denson MD     Note composed:3:15 PM 02/07/2023           Note composed:3:15 PM 02/07/2023

## 2023-02-07 NOTE — TELEPHONE ENCOUNTER
Care Due:                  Date            Visit Type   Department     Provider  --------------------------------------------------------------------------------                                EP -                              PRIMARY      [facility] INTERNAL  Last Visit: 11-      CARE ([facility])   MEDICINE       Lawrence Denson  Next Visit: None Scheduled  None         None Found                                                            Last  Test          Frequency    Reason                     Performed    Due Date  --------------------------------------------------------------------------------    CMP.........  12 months..  pravastatin..............  02- 02-    Lipid Panel.  12 months..  pravastatin..............  02-   02-    [facility] Embedded Care Gaps. Reference number: [id number]. 2/07/2023   2:05:54 PM CST

## 2023-03-26 ENCOUNTER — PATIENT MESSAGE (OUTPATIENT)
Dept: INTERNAL MEDICINE | Facility: CLINIC | Age: 63
End: 2023-03-26
Payer: COMMERCIAL

## 2023-03-26 DIAGNOSIS — F30.10 MANIC BEHAVIOR: Primary | ICD-10-CM

## 2023-03-27 NOTE — TELEPHONE ENCOUNTER
I have attached a text file with my description of the police cuffing at my home and strapping me to a bed at [facility]. A physician who interviewed me in the morning gave me the attached papers.  My first question is this appointment something I am required by law to do?  I did not follow up on your recommendation to see a psychiatrist and now am thinking that was a mistake as I do struggle with a new divorce and mental issues like what seems to me as undiagnosed manic depression.      Please see .

## 2023-03-27 NOTE — TELEPHONE ENCOUNTER
Pt needs appt this week to discuss recent hospitalization and next steps.    Should have resources already, but please give phone number again to call    Call to make an appointment within [facility] for psychiatry (meds) / psychology (counseling)   [phone]     [employer]    Critical access hospital psychiatrists:   Paulette Griffin(psychiatrist) [address] [facility] [address] Phone: [phone]   Nirmal Cuadra (psychiatrist) [phone], [phone] [address] [place] [address]   Dr. Frederick Gabriel - [phone]   Dr. Deidre Matos - [phone]   Dr. Marisela Dill - [phone]   Dr. Flaco Bradley - [phone]     [facility]: [phone]     Therapy/Psychology:   You can try anyone of these number to see if your insurance is accepted or you would have to call your insurance.     Cognitive Behavioral Therapy (CBT) Center [facility]   Address: [facility] [place] [address]   Phone: [phone]   [email or web address]     Integrated Behavioral Health of [address], [address]   Phone: [phone]   You can email for an appointment at: [email or web address]     [facility] Professional Counseling   [address] [place] [address], [facility]   [email or web address]   Dr. Ana Hennessy, [phone] or [email or web address]   Dr. Yoli Velasco, [phone] or [email or web address]     Joanne Chirinos    LCSW (therapist) [phone]   [address] [place] [address]   Flor Christina LCSW (therapist) [phone]   [address] [place] [address]   Lawanda RICCIW (therapist) [phone]   [address] [place] [address]   STACI Genjohnny         LCSW                    [phone]   Michel Bowen [phone] (therapist) [address] [facility]   Cole Christina (therapist) [phone]  [address] [facility]   Quinton Gordillo (therapist) [phone] [address] [facility]     Behavior Health Counseling [phone] [address] [place] [address]     Employee Assistance Program (EAP)    Check through your employer's HR.     [facility] Behavioral Health (accepts medicaid)  [address]  [phone]  or [phone].[facility] ([address])    Online Therapist:     [email or web address]     Free Guided Meditations   [email or web address]/audio   Https://www.[email or web address].[email or web address]/vel/body.cfm?[email or web address]   https://health.[facility]/specialties/mindfulness/programs/mbsr/pages/audio.aspx

## 2023-03-28 ENCOUNTER — PATIENT MESSAGE (OUTPATIENT)
Dept: PSYCHIATRY | Facility: CLINIC | Age: 63
End: 2023-03-28
Payer: COMMERCIAL

## 2023-03-28 ENCOUNTER — OFFICE VISIT (OUTPATIENT)
Dept: PSYCHIATRY | Facility: CLINIC | Age: 63
End: 2023-03-28
Payer: COMMERCIAL

## 2023-03-28 VITALS
HEART RATE: 69 BPM | DIASTOLIC BLOOD PRESSURE: 65 MMHG | WEIGHT: 180.31 LBS | SYSTOLIC BLOOD PRESSURE: 149 MMHG | BODY MASS INDEX: 24.46 KG/M2

## 2023-03-28 DIAGNOSIS — S61.219S: ICD-10-CM

## 2023-03-28 DIAGNOSIS — F12.20 CANNABIS DEPENDENCE: ICD-10-CM

## 2023-03-28 DIAGNOSIS — Z56.6 STRESSFUL JOB: ICD-10-CM

## 2023-03-28 DIAGNOSIS — Z63.0 MARITAL STRESS: ICD-10-CM

## 2023-03-28 DIAGNOSIS — F10.229 ALCOHOL INTOXICATION IN ACTIVE ALCOHOLIC WITH COMPLICATION: ICD-10-CM

## 2023-03-28 DIAGNOSIS — F10.24 ALCOHOL DEPENDENCE WITH ALCOHOL-INDUCED MOOD DISORDER: ICD-10-CM

## 2023-03-28 DIAGNOSIS — F31.81 BIPOLAR II DISORDER: Primary | ICD-10-CM

## 2023-03-28 PROCEDURE — 3008F BODY MASS INDEX DOCD: CPT | Mod: CPTII,S$GLB,, | Performed by: PSYCHIATRY & NEUROLOGY

## 2023-03-28 PROCEDURE — 3078F DIAST BP <80 MM HG: CPT | Mod: CPTII,S$GLB,, | Performed by: PSYCHIATRY & NEUROLOGY

## 2023-03-28 PROCEDURE — 3077F PR MOST RECENT SYSTOLIC BLOOD PRESSURE >= 140 MM HG: ICD-10-PCS | Mod: CPTII,S$GLB,, | Performed by: PSYCHIATRY & NEUROLOGY

## 2023-03-28 PROCEDURE — 99205 PR OFFICE/OUTPT VISIT, NEW, LEVL V, 60-74 MIN: ICD-10-PCS | Mod: S$GLB,,, | Performed by: PSYCHIATRY & NEUROLOGY

## 2023-03-28 PROCEDURE — 99417 PROLNG OP E/M EACH 15 MIN: CPT | Mod: S$GLB,,, | Performed by: PSYCHIATRY & NEUROLOGY

## 2023-03-28 PROCEDURE — 3077F SYST BP >= 140 MM HG: CPT | Mod: CPTII,S$GLB,, | Performed by: PSYCHIATRY & NEUROLOGY

## 2023-03-28 PROCEDURE — 99999 PR PBB SHADOW E&M-EST. PATIENT-LVL III: ICD-10-PCS | Mod: PBBFAC,,, | Performed by: PSYCHIATRY & NEUROLOGY

## 2023-03-28 PROCEDURE — 99999 PR PBB SHADOW E&M-EST. PATIENT-LVL III: CPT | Mod: PBBFAC,,, | Performed by: PSYCHIATRY & NEUROLOGY

## 2023-03-28 PROCEDURE — 99417 PR PROLONGED SVC, OUTPT, W/WO DIRECT PT CONTACT,  EA ADDTL 15 MIN: ICD-10-PCS | Mod: S$GLB,,, | Performed by: PSYCHIATRY & NEUROLOGY

## 2023-03-28 PROCEDURE — 3078F PR MOST RECENT DIASTOLIC BLOOD PRESSURE < 80 MM HG: ICD-10-PCS | Mod: CPTII,S$GLB,, | Performed by: PSYCHIATRY & NEUROLOGY

## 2023-03-28 PROCEDURE — 99205 OFFICE O/P NEW HI 60 MIN: CPT | Mod: S$GLB,,, | Performed by: PSYCHIATRY & NEUROLOGY

## 2023-03-28 PROCEDURE — 3008F PR BODY MASS INDEX (BMI) DOCUMENTED: ICD-10-PCS | Mod: CPTII,S$GLB,, | Performed by: PSYCHIATRY & NEUROLOGY

## 2023-03-28 SDOH — SOCIAL DETERMINANTS OF HEALTH (SDOH): OTHER PHYSICAL AND MENTAL STRAIN RELATED TO WORK: Z56.6

## 2023-03-28 SDOH — SOCIAL DETERMINANTS OF HEALTH (SDOH): PROBLEMS IN RELATIONSHIP WITH SPOUSE OR PARTNER: Z63.0

## 2023-03-28 NOTE — PATIENT INSTRUCTIONS
PLAN:     Mellissa NICK did express concern to Mr Gale / re:  alcohol / cannabis use He  himself noted 'control issues' with both alcohol and cannabis.    Mellissa NICK did recommend Intensive Outpatient Program (IOP) to provide med mngt, education, monitoring,  and other psychological skills such as: cognitive behavioral therapy, interpersonal therapy or other therapeutic interventions (including emotional support).     Mellissa NICK placing referral for Intensive Outpatient Program (IOP) to case mangement to provide pt more information. Pt did not accept to do IOP presently tho said he open to meet / talk with  to learn more of programming.     Pt Was informed that such IOP  is voluntary tho based upon his clinical picture that Mellissa NICK recommended that he attempt IOP. He was also told that he would most likely qualify for inpatient substance and could be referred to that tho as he working he declined such    Mr Gale  was also given phone number of  IOPP case coordinator Ms Mora @ [phone] so that he himself could call and ask to speak to case manger Ms Mora IF for some reason he does not hear back in timely fashion.    Pt was informed and expressed awareness that IF he declines IOP  yet later changes his mind and desirs such that he could reach out to our department by calling [phone] or by using My Chart to message our dept    pt is encouraged to follow up with primary care MD (Dr Denson)      Mellissa NICK will ask psyc dept scheduling to offer Mr Gale a  Follow up  appt  with CHOLO Sneed MD to be scheduled for approx 1 month with CHOLO Sneed MD.     Pt informed IF any acute concerns to call 911 or go to ER    References:     Relaxation stress reduction workbook: RADN Gonsalez PhD ( used: $7-10)    Feeling Good Website: Juan Miguel Latif MD / www.[employer].com website (free) / isaura. PODCASTS    Anxiety &  phobia workbook by PEPE Larry PhD  (web retailers: used: $ 7-10)    [place]: Path to Better Sleep :  https://www.veterantraining.va.gov/insomnia/ (free)     Pt expressed appreciation for the visit today and did not have further question at this time though pt  was still informed to:     Call  if problems.    Call / Report Side Effects to Psyc MD     Encouraged to follow up with primary care / Gen Med MD for continued monitoring of general health and wellness.    Understanding was expressed; and no further concerns nor questions were raised at this time.     Remember healthy self care:   eat right  attempt adequate rest   HANDWASHING / encourage such isaura. During this corona virus time   walk or light exercise within reason and as your general med team approves  read or explore any of reference materials / homework mentioned  reach out (I.e.,  connect with)  others who nuture and bring out best in you  avoid risky behaviors    Keep your appointments:    IF you  cannot make your appt THEN please call [phone] or go online (via My Chart erin) to reschedule.    It is the responsibility of the patient to reschedule an appointment if an appointment has been canceled or missed.    Look for the positive.  All is often relative-seek balance  Call sooner if needed :[phone]  Call 911 or go to Emergency Room  (ER)  if  any acute concerns

## 2023-03-28 NOTE — PROGRESS NOTES
"  PSYCHIATRIC EVALUATION     Disclaimer: Evaluation and treatment is based on information presented to date. Any new information may affect assessment and findings.     Name: Dandy Gale  Age: 62 y.o.  : 1960    Note:Face to Face time with patient: 120 minutes of total time spent on the encounter, which includes face to face time and non-face to face time preparing to see the patient including but not limited to: 1) Obtaining and/or reviewing separately obtained history, 2) Documenting clinical information in the electronic or other health record, 3) Independently  reviewing / interpreting results as clinically indicated ; 4) discussing medication options including risks and benefits as well as 5) recommendations  for therapeutic interventions and 5) where appropriate additional educational resources (ex: reference books / websites); 6) communicating assessment and plan of care to the patient/family/caregiver  7) explaining importance of keeping appts ; and 8) rescheduling IF miss appt  IF acute concerns to call 911 or go to nearest ER.     NOTE: COLOR FONT and / or formatting  by CHOLO Sneed MD     Referred by: (Whose idea to come see Psychiatry) : [facility] Lawrence Denson MD    CHIEF COMPLAINT :  assessment and recs / of psychiatry after / was taken  [facility] after passed out at home after bout of drinking to excess and wife called for emergency response. Pt admits got loud and irritable [ See EPIC EHR  from [facility]) for further detail ]    HISTORY:         Dandy Gale a 62 y.o.  male presents today as referred by [facility] PCP Lawrence Denson MD    Says "things reached a head."    HE himself admits "control issues (his language)" with BOTH  cannabis and alcohol.     Says that has had difficulties with alcohol most all of adult life dating back to his 20s.    Says generally has 3 bottles of wine over course of week THO  says may have a binge couple times a month and on those instances " "he may consume 1 and 1/2  bottle of alcohol over course of evening as well as couple shots of taquila.    Admits mood swings.     He describes about every Month or two he says that "he will be like  raging bull and then have  month  or two of  reflective period."  Says  anyone who really knows me will acknowledge that.     Says he has never had psyc treatment ; says no inpatient nor outpatient.    Cannabis Says he uses prescription cannabis [facility] shows usage with regularly gets supply approx about weekly. See [place]  for more detail.     Says he  only using Rx cannabis / denies other cannabis source tho he does note that 'control issues with cannabis as well. Says been using x years    He did provide letter in My Chart with some highlights of what ahs been going on with him:    >    NOTE: COLOR FONT and / or Underline / formatting by CHOLO Sneed MD :    Thomas Adams conversation starter for Dr Sneed 3/28/2023    ? 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 I have discussed with my Ambulatory physician a theory of mine that since the onset of  my high blood pressure lifes difficulties that were once measured 6-to-10 on a scale of  1-to-10 are coming in at 11.    ? Regardless of my blood pressure theory, the changing perceived volume of life's  stressors for me is not all bad. People around me have noticed I am quicker to call them  out on behavior I would have shrugged off.    ? I could not drop the issues I had building up to firing a long term employee. During that  time I had difficulty sleeping. Usually my sleep is pretty good.    ? My mother (alive in her late 80s) suffered from diagnosed manic depression that would  land her in bed crying for weeks.    ? My mother avoided conflict in life until it boils over and that rubbed off on me, although  Im working on it.    ? 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    ? Reaching a sustained mental flow-state is the only path to completing these tasks.    ? As I get older I find myself starting a task only to decide to check on a news story or  something. These are the kind of tasks that can take a few weeks and even in my youth  I had to think for hours about the problem and solution before I wrote my first sentence.    ? Marijuana helps me with this let the problem soak in phase and the methodical listings  of all the technical specs that follow.    ? I probably smoke too much and my lungs are a bit weary at the moment.    ? I am  my wife, recently fired a long time employee and am in the process  resetting terms with my . At work I see positive effects from the moves and it is too  early to tell with my wife.    ? I pass out after ½ the alcohol that once would have. I passed out a couple of years ago  and then with the episode in bad night for [email or web address]    >>    No SI  /  no HI  / nor prior attempts of self harm    Prior  Treatment  (ex: inpatient / outpatient): denies     Prior  Behavioral health providers / diagnosis: Medication: not on medication for mood    Focus on Self Ratings     MDQ Scale 3/28/2023   you felt so good or so hyper that other people thought you were not your normal self or you were so hyper that you got into trouble? 1   you were so irritable that you shouted at people or started fights or arguments? 1   you felt much more self-confident than usual? 0   you got much less sleep than usual and found that you didn't really miss it? 0   you were more talkative or spoke much faster than usual? 0   thoughts raced through your head or you couldn't slow your mind down? 1   you were so easily distracted by things around you that you had trouble concentrating or staying on track? 1   you had more energy than usual? 1   you were much more active or did  many more things than usual? 1   you were much more social or outgoing than usual, for example, you telephoned friends in the middle of the night? 0   you were much more interested in sex than usual? 0   you did things that were unusual for you or that other people might have thought were excessive, foolish, or risky? 0   spending money got you or your family in trouble? 0   If you checked YES to more than one of the above, have several of these ever happened during the same period of time? 1   How much of a problem did any of these cause you - like being unable to work; having family, money or legal troubles; getting into arguments or fights? Serious problem   Mood Disorder Questionnaire Score  6        GAD7 3/28/2023   1. Feeling nervous, anxious, or on edge? 2   2. Not being able to stop or control worrying? 3   3. Worrying too much about different things? 2   4. Trouble relaxing? 3   5. Being so restless that it is hard to sit still? 3   6. Becoming easily annoyed or irritable? 3   7. Feeling afraid as if something awful might happen? 1   8. If you checked off any problems, how difficult have these problems made it for you to do your work, take care of things at home, or get along with other people? 1   GEORGE-7 Score 17      Depression Patient Health Questionnaire 3/28/2023 11/4/2022 8/18/2022   Over the last two weeks how often have you been bothered by little interest or pleasure in doing things Not at all Not at all Not at all   Over the last two weeks how often have you been bothered by feeling down, depressed or hopeless Several days Not at all Not at all   PHQ-2 Total Score 1 0 0   Over the last two weeks how often have you been bothered by trouble falling or staying asleep, or sleeping too much Several days - -   Over the last two weeks how often have you been bothered by feeling tired or having little energy Several days - -   Over the last two weeks how often have you been bothered by a poor appetite or  "overeating Several days - -   Over the last two weeks how often have you been bothered by feeling bad about yourself - or that you are a failure or have let yourself or your family down Not at all - -   Over the last two weeks how often have you been bothered by trouble concentrating on things, such as reading the newspaper or watching television More than half the days - -   Over the last two weeks how often have you been bothered by moving or speaking so slowly that other people could have noticed. Or the opposite - being so fidgety or restless that you have been moving around a lot more than usual. Several days - -   Over the last two weeks how often have you been bothered by thoughts that you would be better off dead, or of hurting yourself Not at all - -   If you checked off any problems, how difficult have these problems made it for you to do your work, take care of things at home or get along with other people? Somewhat difficult - -   Total Score 7 - -   Interpretation Mild - -     Physical Abuse: n    Sexual abuse  n    Other Trauma?:n    Psychosis: n    >>  AS IN CARE EVERYWHERE / EXCERPT FROM HIS 3-24-23 PRESENTATION TO [facility]    Patient is a 62 y.o.  male with past psychiatric history of anxiety who presented on 3/24/23 at 2144 Involuntarily by police for aggressive behavior.     Per ED:  Patient presents [facility] for aggressive behavior. Has a history of BPH, ALONDRA, HLD, anxiety per [facility] notes, seen by PCP 11/2022 with referral to psychiatry. Unclear if patient has followed up. Patient aggressively yelling at staff, unable to complete history.     Collateral obtained from patient's wife. This evening the patient was drinking alcohol and fell down the stairs. Wife wanted to call 911 but he got very angry. [place] him fall again noted his hand to be bleeding and blood was all over. She called 911, he was "really really mad" he was out in the yard yelling all kinds of stuff. Will drink to the " "point of passing out in the past. Drinking more recently, stressors at work. Report periods of "manic" behavior, gets hyperfocused on projects for weeks at a time, gets aggressive with wife, in one of his manic phases starts shifting sleep cycle. Periods of time with despondence, never reports SI.     Per DARSH[facility]:    Pt was placed in restraints and given H + A @2201 last night.    This morning, pt was resting comfortably in bed and was calm and cooperative.  He said that he was drinking some wine last night and fell, hurt his finger on the glass, and then his wife "freaked out" and called the police who brought him here. He said there's nothing wrong with him otherwise and requested to be discharged. He said he drank 1 bottle of wine + 2 shots of tequila yesterday which was more than usual, said he used to drink 1 bottle/night daily ~1 year ago but has since cut back because he passed out back then. He said recently he drinks ~3x/week 1-2 glasses. Denied hx or current withdrawal symptoms, denied hx of rehab/AA, any seizures/DTs/hallucinations.     Said he's been using medical marijuana ~ 1 year for anxiety and denied any other substance use. Discussed rehab for alcohol and he declined. Discussed cutting back on alcohol and marijuana and pt agreed.     He said he has trouble concentrating when he's anxious and wants to "flip through internet tabs" which negatively impacts his work, but that the marijuana has been helping and his work has been the best it's ever been. He denied depression symptoms (pan negative on sigecaps). Denied denied not sleeping, grandiose thoughts, impulsive behavior, feeling distracted, said he does have increase energy/activity sometimes. He said he thinks he's "manic depressive" like his mom and that his wife thinks so also. He said he's still functional during his episodes and said that he likes being more productive in his "manic" phases, and gets more "reflective" in his depressive phase, so " "he does not want treatment. Discussed symptoms of urbano, hypomania, anxiety, and possibly ADHD (reported inattention symptoms since childhood) and encouraged pt to go to outpatient psychiatry for evaluation and pt said he'd consider it.     Denied SI/HI/AVH, denied previous SA or having weapons in house.    Collateral:  Extended Emergency Contact Information  Primary Emergency Contact: Arin Castellanos  [facility]  Home Phone: [phone]  Relation: Spouse    Arin said that she thinks pt is "manic depressive" and that in his manic phases, he doesn't sleep well and is "charged up", can't sit still, paces, and has grandiose thoughts like he's the smartest in the world. She said that he's also been paranoid at times, thinks that he's being "persecuted" or "accused" when she talks sometimes but she thinks she's speaking normally. She said he's been drinking heavily, ~ 1 bottle/wine per day but that he drank a lot more last night, leading him to fall down some stairs and somehow cut his hand. She does not think that he's at risk of hurt himself or others and said he's welcome home. However she feels overwhelmed due to his behavior which has negatively impacted her mental health, and that she may stay elsewhere. She denied having any weapons in house. She thinks he sometimes also has these "manic" episodes when he's sober but is unsure as she doesn't think he's sober very often.     Current Medications:   Scheduled Meds:   PRN Meds:   OTC Meds:   Home Meds:     Allergies   Allergen Reactions    Shellfish Containing Products Other (See Comments)     History reviewed. No pertinent past medical history.    Past Psychiatric History:  Previous Medication Trials: no  Previous Psychiatric Hospitalizations: no   Previous Suicide Attempts: denies   History of Violence: denies   Outpatient psychiatrist: no    Social History:  Marital Status:   Children: 2  Source of Income: Employed - computer " "  Education: High school graduate, and technical certificates  Special Ed: no   Housing Status: With family - wife and son  History of phys/sexual abuse: not asked  Easy access to gun: denies     Substance Use (with emphasis over the last 12 months)  Recreational Drugs: cannabis medical; denied others  Use of Alcohol: heavy 1-2 glasses of wine 3x/week; bottle/night per wife  Tobacco Use: no  Rehab History: no  H/O Complicated Withdrawal: no    Legal History:  Past Charges/Incarcerations: no  Pending charges: no    Family Psychiatric History:  Yes - mom bipolar    Vitals:  Vitals:   03/25/23 1000   BP: 116/59   Pulse: (!) 57   Resp: 15   Temp:     Wt Readings from Last 3 Encounters:   09/28/13 77.1 kg (170 lb)   09/27/13 72.6 kg (160 lb)     Mental Status Exam:  (Note when at [facility])  Appearance: No apparent distress, Fair hygiene and grooming  Sensorium: Alert, Awake  Orientation: Person, Place, Month, Situation  Behavior/Cooperation: Calm, Cooperative, Pleasant  Speech: Spontaneous, Normal rate, Normal tone, Normal volume  Mood: "anxious to go home"  Affect: Full, Reactive  Thought Process: Linear, Goal-directed  Thought Content: Pt denies SI/HI and paranoia  Perceptual Disturbances: Pt denies AVH, No objective signs of hallucinations  Memory: Intact to conversation  Concentration: Intact to conversation  Fund of Knowledge: Intact to conversation  Insight: Poor  Judgment: Appropriate for setting  Impulse Control: Limited  Reliability: Questionable    Labs/Imaging/Studies:  Recent Results (from the past 24 hour(s))   CMP   Collection Time: 03/24/23 10:59 PM   Result Value Ref Range   Sodium 141 135 - 146 mmol/L   Potassium 3.3 (L) 3.6 - 5.2 mmol/L   Chloride 104 96 - 110 mmol/L   Carbon Dioxide 19 (L) 24 - 32 mmol/L   Glucose 100 (H) 65 - 99 mg/dL   Calcium 9.6 8.4 - 10.3 mg/dL   BUN 10.0 7.0 - 25.0 mg/dL   Creatinine 0.83 0.70 - 1.40 mg/dL   Total Protein 6.6 6.0 - 8.0 g/dL   Albumin 4.1 3.4 - 5.0 g/dL "   AST 31 <45 U/L   ALT 19 <46 U/L   Alkaline Phosphatase 65 20 - 120 U/L   Bilirubin, Total 0.6 <1.3 mg/dL   EGFR 98 >=90 mL/min   Magnesium   Collection Time: 03/24/23 10:59 PM   Result Value Ref Range   Magnesium 2.0 1.5 - 2.6 mg/dL   Phosphorus   Collection Time: 03/24/23 10:59 PM   Result Value Ref Range   Phosphorus 3.7 2.5 - 4.7 mg/dL   Volatile Compounds   Collection Time: 03/24/23 10:59 PM   Result Value Ref Range   Methyl Alcohol (GLC) <4 <4 mg/dL   Isopropyl Alcohol (GLC) <4 <4 mg/dL   Ethanol 144 (H) <15 mg/dL   Acetaminophen   Collection Time: 03/24/23 10:59 PM   Result Value Ref Range   Acetaminophen <10.0 (L) 10.0 - 20.0 µg/mL   Salicylate   Collection Time: 03/24/23 10:59 PM   Result Value Ref Range   Salicylate Level <2.5 (L) 15.0 - 30.0 mg/dL   CBC with Differential   Collection Time: 03/24/23 10:59 PM   Result Value Ref Range   WBC 11.2 (H) 4.5 - 11.0 103/uL   MDW 16.2 <20.0 %   RBC 4.71 4.50 - 5.90 106/uL   Hemoglobin 15.3 13.5 - 17.5 gm/dL   Hematocrit 44.6 40.0 - 51.0 %   MCV 94.8 80.0 - 100.0 fL   MCH 32.5 26.0 - 34.0 pg   MCHC 34.3 31.0 - 37.0 g/dL   RDW 13.7 11.5 - 14.5 %   Platelet Count 249 130 - 400 103/uL   MPV 9.0 7.4 - 10.4 fL   Neutrophils Absolute - Instrument 9.00 (H) 1.80 - 8.00 103/uL   Lymphocytes Absolute - Instrument 1.20 1.10 - 5.00 103/uL   Monocytes Absolute - Instrument 0.90 0.20 - 1.10 103/uL   Eosinophils Absolute - Instrument 0.10 0.00 - 0.60 103/uL   Basophils Absolute - Instrument 0.10 0.00 - 0.20 103/uL   Neutrophils Percent - Instrument 80.0 %   Lymphocytes Percent - Instrument 10.5 %   Monocytes Percent - Instrument 7.9 %   Eosinophils Percent - Instrument 0.9 %   Basophils Percent - Instrument 0.7 %   Blood Gas, Venous   Collection Time: 03/24/23 11:45 PM   Result Value Ref Range   pH, Venous 7.32 7.32 - 7.42   pCO2, Venous 44 41 - 51 mmHg   tHb, Venous 15.9 12.0 - 18.0 g/dL   Hematocrit, Venous 48 38 - 48 %   Carboxyhemoglobin, Venous 3.0 0.0 - 3.0 %    MetHb 1.1 0.0 - 1.5 %   HCO3 22.7 (L) 25.0 - 40.0 mmol/L   Sample Draw Device IV   Sample Site LFA   : KAREN ZAVALA   Drawn By: RN   FIO2 21.0 %   Note 1 2. Venous     Drug Screen, Urine   Collection Time: 03/25/23 3:03 AM   Result Value Ref Range   Amphetamine Screen, Urine Not Detected Not Detected   Barbiturate Screen, Urine Not Detected Not Detected   Benzodiazepine Screen, Urine Not Detected Not Detected   THC Screen, Urine See Confirmation (A) Not Detected   Cocaine Screen, Urine Not Detected Not Detected   Opiates Screen, Urine Not Detected Not Detected   Phencyclidine Screen, Urine Not Detected Not Detected   Methadone Screen, Urine Not Detected Not Detected   Fentanyl Screen, Urine Not Detected Not Detected ng/mL   Urinalysis, Microscopic if Indicated, Culture if Indicated   Collection Time: 03/25/23 3:03 AM     Specimen: Urine Voided   Result Value Ref Range   Color Pale Yellow Colorless, Straw, Yellow, Pale Yellow   Clarity/Appearance Clear Clear   Specific Gravity 1.009 1.005 - 1.030   pH 5.0 4.5 - 8.0   Glucose, UA Normal Negative, Normal   Protein Negative Negative   Ketones Negative Negative   Bilirubin, Urine Negative Negative   Urobilinogen, UA Normal <2   Nitrites Negative Negative   Blood 0.03 mg/dL (A) Negative   Leukocyte Esterase, UA Negative Negative   Urine, Microscopic   Collection Time: 03/25/23 3:03 AM   Result Value Ref Range   WBCs 0-5 0 - 5 /HPF   RBCs 0-2 0 - 2 /HPF   Bacteria Rare None Seen, Rare /HPF   Hyaline Casts 6-10 (A) 0 - 5 /LPF   Mucus Rare (A) None Seen /LPF   SARS-CoV-2/Flu/RSV Cepheid Multiplex - All ED patients with respiratory symptoms (aka Covid-19)   Collection Time: 03/25/23 9:37 AM   Specimen: Nasopharyngeal Swab; Respiratory   Result Value Ref Range   SARS-CoV-2 by PCR Negative Negative   Influenza A by PCR Negative Negative   Influenza B by PCR Negative Negative   RSV By PCR Negative Negative   Previous Labs/Results:  No results found for: LITHIUM,  VALPROATE, CBMZ    Lab Results   Component Value Date   ETHANOL 144 (H) 03/24/2023   AMPHETAMINE SCREEN, URINE Not Detected 03/25/2023   COCAINE SCREEN URINE Not Detected 03/25/2023   OPIATES SCREEN URINE Not Detected 03/25/2023   THC SCREEN, URINE See Confirmation (A) 03/25/2023   PHENCYCLIDINE SCRN UR Not Detected 03/25/2023   BARBITURATE SCREEN, URINE Not Detected 03/25/2023   BENZODIAZEPINE SCREEN, URINE Not Detected 03/25/2023     Assessment (including DSM-5 Diagnosis(es)):  Alcohol intoxication - resolved  Alcohol use disorder  Anxiety, unspecified  R/o bipolar 2 disorder    Plan:  1. Dispo/Legal Status: Pt does not meet criteria for PEC or inpt psych admit at this time. Recommend to rescind PEC. Pt is not currently an imminent danger to self, nor to others due to an acute psych illness, nor gravely disabled due to an acute psych illness. Pt is cleared from a psych standpoint and can be discharged to home; will sign off. Resources for detox/rehab and MHCs were placed in the Discharge Instructions tab.  2. Medication Recommendations: None. Will defer to outpt psych.  3. Follow-up: Pt referred to [facility] walk-in clinics  4. Return to ED: any worsening SI/HI or any other acute changes to mental status        Eulogio Beth MD  [facility] PGY2      Electronically signed by Geraldo Stanton MD at 03/25/2023 4:21 PM CDT  Radu Acosta MD - 03/24/2023 10:00 PM CDT  Formatting of this note might be different from the original.  10:00 PM 03/24/23    RESTRAINT OR SECLUSION FOR VIOLENT BEHAVIOR:   FACE TO FACE EVALUATION     Patient's immediate condition: Aggressive, Assaultive, Combative    Patient demonstrated the following behaviors- Aggressive, Assaultive, Combative,     Patient's reaction to intervention: anger, excessively aggressive, and hyperactive    Patient's medical and behavioral condition assessed with the following findings:    There were no vitals filed for this visit.    ROS: Not  "Cooperative  History and Physical reviewed with the following changes: None  Medications and laboratory values reviewed: Not available   Behavioral assessment: LOC-alert, Not cooperative, Hallucinations-None, Judgement-impaired judgment, Mood- angry, Thought process-flight of ideas, Delusions -  Assessment: Aggressive, combative,     Continue Restraints / Seclusion: Yes  Other plans: Medications   Radu Acosta MD  03/24/23 2200     Ryan Downs RN - 03/24/2023 9:45 PM CDT  Formatting of this note might be different from the original.  [facility] for [facility] behavior. Per family on scene, chronic ETOH use. Lately he's been drinking more and his mental health has "declined." Denies SI/HI. Refusing vitals. Pt continues to scream and threaten staff.   Electronically signed by Ryan Downs RN at 03/24/2023 9:49 PM CDT  Urine (03/25/2023 3:03 AM CDT)  Authorizing Provider Result Type   Radu Acosta MD URINE ORDERABLES     Lab Results - (ABNORMAL) Drug Screen Mass Spec Analysis, Urine (03/25/2023 3:03 AM CDT)  Performing Organization Address City/State/ZIP Code Phone Number   [facility]   [address] [place] [address]          Back to top of Lab Results       (ABNORMAL) Drug Screen, Urine (03/25/2023 3:03 AM CDT)  Lab Results - (ABNORMAL) Drug Screen, Urine (03/25/2023 3:03 AM CDT)  Component Value Ref Range Test Method Analysis Time Performed At [facility]   Amphetamine Screen, Urine Not Detected Not Detected   03/25/2023 3:48 AM CDT [facility]     Barbiturate Screen, Urine Not Detected Not Detected   03/25/2023 3:48 AM CDT [facility]     Benzodiazepine Screen, Urine Not Detected Not Detected   03/25/2023 3:48 AM CDT [facility]     THC Screen, Urine See Confirmation (A) Not Detected   03/25/2023 3:48 AM CDT [facility]     Cocaine Screen, Urine Not Detected Not Detected   03/25/2023 3:48 AM CDT [facility]     Opiates Screen, Urine Not Detected Not Detected   " 03/25/2023 3:48 AM CDT [facility]     Phencyclidine Screen, Urine Not Detected Not Detected   03/25/2023 3:48 AM CDT [facility]     Methadone Screen, Urine Not Detected Not Detected   03/25/2023 3:48 AM CDT [facility]     Fentanyl Screen, Urine Not Detected Not Detected ng/mL   03/25/2023 3:48 AM CDT [facility]           >>  continued info from interveiw [facility] : >>    Substance Use:  HE Himself admits control issues cannabis and alchol / see hsitory above  / deneis other illicit substance use    Tobacco: FORMER     Statrted 17y old / stopped about 29    Cocaine: n  Benzo:  Opioid: n  Ecstasy:n  Other: n    PAST PSYCHIATRIC HISTORY:     Inpatient:  n  Outpatient: (incl. Primary Care): n      Allergy Review:   Review of patient's allergies indicates:   Allergen Reactions    Shellfish containing products         Medical Problem List:   Patient Active Problem List   Diagnosis    Fracture of humerus, proximal, left, closed    Fracture of metacarpal base of left hand, closed    Hand pain, left    First metacarpal bone fracture    Enlarged prostate with urinary obstruction    Elevated PSA    Other hyperlipidemia    ALONDRA (obstructive sleep apnea)    Screening for malignant neoplasm of colon    Snoring    Former smoker    Incomplete bladder emptying    Senile purpura    Nocturia    Cannabis dependence    Job stress     Marital stress    Alcohol dependence  x years [generally has 3 bottles wine over week THO  may have a binge couple times a month and consume 1 and 1/2 bottles wine and perhaps 1-2 shot of tequila     Bipolar II disorder    Laceration of skin of finger        Past Surgical History:   Procedure Laterality Date    COLONOSCOPY N/A 2/12/2020    Procedure: COLONOSCOPY;  Surgeon: Paulette George MD;  Location: [facility] ([address] [place] [address]);  Service: Endoscopy;  Laterality: N/A;    FRACTURE SURGERY      l shoulder plate       l thumb plate insertion      SKIN BIOPSY  1/1/1995    Should be in chart         Other (medical) :    Head Injury: n  Seizure: n  Diabetes n  HTN  YES  Other:  n    Laceration L little finger (5th digit tip approx 1 cm; says from broken wine bottle) on [facility] that [facility]) [facility]     Family History:  Family History   Problem Relation Age of Onset    Prostate cancer Father     Hearing loss Father     Hypertension Mother     Depression Mother     Cancer Paternal Grandmother         liver? Dx later stage    Heart disease Maternal Grandmother     Heart disease Maternal Grandfather     Heart attack Paternal Grandfather         poor diet    Melanoma Neg Hx       Mental Status Exam:   Appearance: casual /   Oriented: x 3 / including: Date: mar 28 2023 ; and aware that at: [facility] [place],   Attitude: cooperative Eye Contact: good   Behavior: calm here   Mood: mood up   Cognition: alert / 20-3: 17, 14, 11, 8, 5 ,2   Concentration: grossly intact   Affect: appropriate range   Anxiety: moderate   Thought Process: goal directed   Speech: Volume : WNL   Quantity WNL   Quality: appears to openly answer questions   Eye Contact: good   Insight: fair to good   Threats: no SI / no HI   Memory: intact (as relay information across time spans) Pres?  BIDEN         Prior Pres? TRUMP  Psychosis: denies all   Estimate of Intellectual Function: average   Judgment (to simple situation): if saw a house on fire / A: call 911   Impulse Control: no history SI / nor HI ; calm here     3 wishes? : pass / would need think more about    PSYCHO-SOCIAL DEVELOPMENT HISTORY:   Social History     Socioeconomic History    Marital status:    Tobacco Use    Smoking status: Former     Packs/day: 1.00     Years: 10.00     Pack years: 10.00     Types: Cigarettes     Start date: 1980     Quit date: 1990     Years since quittin.2    Smokeless tobacco: Former     Quit date: 1990   Substance and Sexual Activity    Alcohol use: Yes     Alcohol/week: 2.0 standard drinks     Types: 2  Glasses of wine per week    Drug use: Not Currently    Sexual activity: Yes     Partners: Female     Birth control/protection: Post-menopausal        City Born: [place]) or half)  Brothers: one / 4 yr (Sidney) younger (lives [place])  Sisters: none    Parents :  both parents alive and still   Briefly Describe  your Mom: loving emotional (bipolar)   Briefly Describe your Dad: dynamic unique (loving to pt)     Bio Mom: Occupation:    Bio Dad:  Occupation:performer / musician write opera /  x til retired in [place]     Marital Status: first for him / wife's second (Arin: neo )  (brief)//   almost 40 yrs 1985 // wife inherit oil royalties / met her [facility]     Children   Girls  (ages): 33y lives [place] / [place] /  / has 6 yr old healty son  Boys (ages): 25 Mp // very into own world ; he can function / not working nor school play [employer] / [employer] / indiv / 2-3 art [employer] [facility] / not     Education: [employer]  ([place]) high school /industry certifications /     Evangelical / Spiritual: none /  atheist (not rejet possibility of God)    Legal Issues? none  DWI ? n  FCI time? 1 Night / block bus democratic convention / try stop     Employment:   Longest Job? 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 n    Assessment:     Encounter Diagnoses   Name Primary?    Bipolar II disorder Yes    Recent Alcohol intoxication (3-24-23) passed out after drinking binge and was taken to [facility] hosp where angry yelling requiring 4 PT RESTRAINT he was offerred sub programming reported declined      Alcohol dependence  x years [generally has 3 bottles wine over week THO  may have a binge couple times a month and consume 1 and 1/2 bottles wine and perhaps 1-2 shot of tequila      Cannabis dependence [Says he currently uses only  prescription cannabis [place]  Pharm Monitor shows  regularly gets supply approx about weekly.     Laceration Left tip 5th digit / skin on night 3-24-23  he was intoxicated and says cut finger on wine bottle     Marital stress     Some Job stress          Patient Instructions       PLAN:     Mellissa NICK did express concern to Mr Gale / re:  alcohol / cannabis use He  himself noted 'control issues' with both alcohol and cannabis.    Mellissa NICK did recommend Intensive Outpatient Program (IOP) to provide med mngt, education, monitoring,  and other psychological skills such as: cognitive behavioral therapy, interpersonal therapy or other therapeutic interventions (including emotional support).     Mellissa NICK placing referral for Intensive Outpatient Program (IOP) to case mangement to provide pt more information. Pt did not accept to do IOP presently tho said he open to meet / talk with  to learn more of programming.     Pt Was informed that such IOP  is voluntary tho based upon his clinical picture that Mellissa NICK recommended that he attempt IOP. He was also told that he would most likely qualify for inpatient substance and could be referred to that tho as he working he declined such    Mr Gale  was also given phone number of  IOPP case coordinator Ms Mora @ [phone] so that he himself could call and ask to speak to case manger Ms Mora IF for some reason he does not hear back in timely fashion.    Pt was informed and expressed awareness that IF he declines IOP  yet later changes his mind and desirs such that he could reach out to our department by calling [phone] or by using My Chart to message our dept    pt is encouraged to follow up with primary care MD (Dr Denson)      Mellissa NICK will ask psyc dept scheduling to offer Mr Gale a  Follow up  appt  with CHOLO Sneed MD to be scheduled for approx 1 month with CHOLO Sneed MD.     Pt informed IF any acute concerns to call 911 or go to ER    References:     Relaxation stress reduction workbook:  RAND Gonsalez PhD ( used: $7-10)    Feeling Good Website: Juan Miguel Latif MD / [email or web address] website (free) / isaura. PODCASTS    Anxiety &  phobia workbook by PEPE Larry PhD  (web retailers: used: $ 7-10)    [place]: Path to Better Sleep : https://www.veterantraining.va.gov/insomnia/ (free)     Pt expressed appreciation for the visit today and did not have further question at this time though pt  was still informed to:     Call  if problems.    Call / Report Side Effects to Psyc MD     Encouraged to follow up with primary care / Gen Med MD for continued monitoring of general health and wellness.    Understanding was expressed; and no further concerns nor questions were raised at this time.     Remember healthy self care:   eat right  attempt adequate rest   HANDWASHING / encourage such isaura. During this corona virus time   walk or light exercise within reason and as your general med team approves  read or explore any of reference materials / homework mentioned  reach out (I.e.,  connect with)  others who nuture and bring out best in you  avoid risky behaviors    Keep your appointments:    IF you  cannot make your appt THEN please call [phone] or go online (via My Chart erin) to reschedule.    It is the responsibility of the patient to reschedule an appointment if an appointment has been canceled or missed.    Look for the positive.  All is often relative-seek balance  Call sooner if needed :[phone]  Call 911 or go to Emergency Room  (ER)  if  any acute concerns

## 2023-03-29 ENCOUNTER — DOCUMENTATION ONLY (OUTPATIENT)
Dept: PSYCHIATRY | Facility: CLINIC | Age: 63
End: 2023-03-29
Payer: COMMERCIAL

## 2023-03-29 ENCOUNTER — PATIENT MESSAGE (OUTPATIENT)
Dept: PSYCHIATRY | Facility: CLINIC | Age: 63
End: 2023-03-29
Payer: COMMERCIAL

## 2023-03-29 PROBLEM — S61.219A LACERATION OF SKIN OF FINGER: Status: ACTIVE | Noted: 2023-03-29

## 2023-03-29 PROBLEM — F10.24 ALCOHOL DEPENDENCE WITH ALCOHOL-INDUCED MOOD DISORDER: Status: ACTIVE | Noted: 2023-03-29

## 2023-03-29 PROBLEM — Z56.6 STRESSFUL JOB: Status: ACTIVE | Noted: 2023-03-29

## 2023-03-29 PROBLEM — Z63.0 MARITAL STRESS: Status: ACTIVE | Noted: 2023-03-29

## 2023-03-29 PROBLEM — F31.81 BIPOLAR II DISORDER: Status: ACTIVE | Noted: 2023-03-29

## 2023-03-29 NOTE — PROGRESS NOTES
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 I drank too much and passed out, dropping my wine glass and cutting my left pinky finger.     There was a  period I do not remember well where I looked for a band-aid and spread drops of  blood from my pinky around the house. It looked worse than it was.    I was too drunk to clearly recall but I vaguely remember coming to and Arin (my  wife) panically talking to the 911 and me drunkenly screaming dont call the  Ambulance.     At some point they suggested police backup to Arin. 911 had no idea what  they were getting into.     This is the point where I imagine having a partner who  would say, hold on Thomas is not going to hurt anyone and I will calm him down while  the ambulance is in the way.     I was obviously unable to rationally negotiate the  coming events as the  confronted me in my underwear.    They put the handcuffs on too tight and strapped me to the gurney. I have a skin rash from the gurney. The doctor released me after 1 discussion.    I was discharged with some cryptic (to me) appointment that seems required.    >>  Comment by Mellissa NICK  > see [facility] Record in [facility] desription of events   "

## 2023-03-29 NOTE — TELEPHONE ENCOUNTER
Problem: Discharge Planning  Goal: Discharge to home or other facility with appropriate resources  Outcome: Progressing     Problem: Pain  Goal: Verbalizes/displays adequate comfort level or baseline comfort level  Outcome: Progressing  Flowsheets (Taken 9/1/2022 9127)  Verbalizes/displays adequate comfort level or baseline comfort level: Encourage patient to monitor pain and request assistance Such info from reviewed in session  D Post MD

## 2023-03-29 NOTE — PROGRESS NOTES
Info as sent to in box 3-29-23a fter initial Psyc Eval:    Note See Psyc MD reply in EPIC Encounters      Dr Sneed, Follow-up to initial consultation  3/29/2023    Thanks again for meeting with me yesterday. After a night's sleep I woke up wanting to stress the following points.    ? While the incident described in Bad night for [email or web address].rossana was a  kkdzxsxwbrtlpg-hyyp-ay-Dickson moment for me, it is important not to over exaggerate or  over react to the event.    ? Last night I emptied all the Alcohol in the house and am planning on giving marijuana a  break.    ? I think my alcohol problem is the only reason I was unable to negotiate the events Friday night and I have concluded that I need to eliminate it from my life.    ? While I do have problems with alcohol and possibly a marijuana overconsumption, here  again scale and effect matter.    ? You mentioned my wife Arin a couple of times as someone to talk to or work with [facility]. I  do not want you or anyone there to do that. I have hired a  who will be handling  my divorce proceedings and to advise me on options, or lack thereof, related to how I  was handled and what got reported.    ? 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 I want to explore life outside of a marriage decision I made when I was 24. Ill probably start by increasing my participation in the [place] [employer] events in the  area.

## 2023-03-31 ENCOUNTER — TELEPHONE (OUTPATIENT)
Dept: PSYCHIATRY | Facility: CLINIC | Age: 63
End: 2023-03-31

## 2023-05-05 DIAGNOSIS — E78.49 OTHER HYPERLIPIDEMIA: ICD-10-CM

## 2023-05-05 RX ORDER — PRAVASTATIN SODIUM 20 MG/1
TABLET ORAL
Qty: 90 TABLET | Refills: 0 | Status: SHIPPED | OUTPATIENT
Start: 2023-05-05 | End: 2024-01-22 | Stop reason: SDUPTHER

## 2023-05-05 NOTE — TELEPHONE ENCOUNTER
Refill Routing Note   Medication(s) are not appropriate for processing by [facility] for the following reason(s):      Required labs outdated    [facility] action(s):  Defer Appointment due  Labs due            Appointments  past 12m or future 3m with PCP    Date Provider   Last Visit   11/4/2022 Lawrence Denson MD   Next Visit   Visit date not found Lawrence Denson MD   ED visits in past 90 days: 0        Note composed:6:07 AM 05/05/2023

## 2023-05-05 NOTE — TELEPHONE ENCOUNTER
Care Due:                  Date            Visit Type   Department     Provider  --------------------------------------------------------------------------------                                EP -                              PRIMARY      [facility] INTERNAL  Last Visit: 11-      CARE ([facility])   MEDICINE       Lawrence Denson  Next Visit: None Scheduled  None         None Found                                                            Last  Test          Frequency    Reason                     Performed    Due Date  --------------------------------------------------------------------------------    CMP.........  12 months..  pravastatin..............  02- 02-    Lipid Panel.  12 months..  pravastatin..............  02-   02-    [employer] Embedded Care Due Messages. Reference number: [id number].   5/05/2023 5:16:15 AM CDT

## 2023-09-03 DIAGNOSIS — R35.1 BPH ASSOCIATED WITH NOCTURIA: ICD-10-CM

## 2023-09-03 DIAGNOSIS — N40.1 BPH ASSOCIATED WITH NOCTURIA: ICD-10-CM

## 2023-09-05 DIAGNOSIS — R97.20 ELEVATED PSA: Primary | ICD-10-CM

## 2023-09-05 RX ORDER — ALFUZOSIN HYDROCHLORIDE 10 MG/1
10 TABLET, EXTENDED RELEASE ORAL
Qty: 90 TABLET | Refills: 0 | Status: SHIPPED | OUTPATIENT
Start: 2023-09-05

## 2023-09-16 DIAGNOSIS — N40.1 BPH WITH URINARY OBSTRUCTION: ICD-10-CM

## 2023-09-16 DIAGNOSIS — N13.8 BPH WITH URINARY OBSTRUCTION: ICD-10-CM

## 2023-09-18 RX ORDER — FINASTERIDE 5 MG/1
5 TABLET, FILM COATED ORAL
Qty: 90 TABLET | Refills: 3 | OUTPATIENT
Start: 2023-09-18

## 2023-11-06 ENCOUNTER — PATIENT MESSAGE (OUTPATIENT)
Dept: ADMINISTRATIVE | Facility: HOSPITAL | Age: 63
End: 2023-11-06
Payer: COMMERCIAL

## 2023-11-27 ENCOUNTER — TELEPHONE (OUTPATIENT)
Dept: PHARMACY | Facility: CLINIC | Age: 63
End: 2023-11-27
Payer: COMMERCIAL

## 2023-11-27 NOTE — TELEPHONE ENCOUNTER
Completed chart review and assessed patient medication adherence for [facility].

## 2023-12-24 ENCOUNTER — TELEPHONE (OUTPATIENT)
Dept: PHARMACY | Facility: CLINIC | Age: 63
End: 2023-12-24
Payer: COMMERCIAL

## 2024-01-22 ENCOUNTER — OFFICE VISIT (OUTPATIENT)
Dept: URGENT CARE | Facility: CLINIC | Age: 64
End: 2024-01-22
Payer: COMMERCIAL

## 2024-01-22 VITALS
RESPIRATION RATE: 16 BRPM | TEMPERATURE: 98 F | SYSTOLIC BLOOD PRESSURE: 150 MMHG | WEIGHT: 186.81 LBS | BODY MASS INDEX: 25.3 KG/M2 | HEART RATE: 100 BPM | OXYGEN SATURATION: 99 % | DIASTOLIC BLOOD PRESSURE: 80 MMHG | HEIGHT: 72 IN

## 2024-01-22 DIAGNOSIS — U07.1 COVID-19: Primary | ICD-10-CM

## 2024-01-22 DIAGNOSIS — R68.83 CHILLS: ICD-10-CM

## 2024-01-22 DIAGNOSIS — E78.49 OTHER HYPERLIPIDEMIA: ICD-10-CM

## 2024-01-22 DIAGNOSIS — U07.1 COVID-19 VIRUS DETECTED: ICD-10-CM

## 2024-01-22 LAB
CTP QC/QA: YES
SARS-COV-2 AG RESP QL IA.RAPID: POSITIVE

## 2024-01-22 PROCEDURE — 99214 OFFICE O/P EST MOD 30 MIN: CPT | Mod: S$GLB,,, | Performed by: FAMILY MEDICINE

## 2024-01-22 PROCEDURE — 87811 SARS-COV-2 COVID19 W/OPTIC: CPT | Mod: QW,S$GLB,, | Performed by: FAMILY MEDICINE

## 2024-01-22 RX ORDER — NIRMATRELVIR AND RITONAVIR 300-100 MG
KIT ORAL
Qty: 30 TABLET | Refills: 0 | Status: SHIPPED | OUTPATIENT
Start: 2024-01-22

## 2024-01-22 RX ORDER — PRAVASTATIN SODIUM 20 MG/1
20 TABLET ORAL DAILY
Qty: 90 TABLET | Refills: 0 | Status: SHIPPED | OUTPATIENT
Start: 2024-01-22 | End: 2024-04-21

## 2024-01-22 NOTE — PATIENT INSTRUCTIONS
You have been prescribed Paxlovid treatment for COVID-19.      Even though your statin was refilled for 90 days It is advised that you hold your cholesterol-lowering medication/statin therapy during your treatment with Paxlovid and resuming after you completed the Paxlovid.      If you have any worsening or worrisome symptoms such as chest pain or shortness a breath or dizziness or for any other worrisome symptoms you must go to the ER immediately for further evaluation and management of symptoms.      It is also advised that you isolate for 5 days from symptom onset and then wear a mask around others from day 6 through 10

## 2024-01-22 NOTE — PROGRESS NOTES
Subjective:      Patient ID: Dandy Gale is a 63 y.o. male.    Vitals:  height is 6' (1.829 m) and weight is 84.8 kg (186 lb 13.4 oz). His oral temperature is 98.1 °F (36.7 °C). His blood pressure is 150/80 (abnormal) and his pulse is 100. His respiration is 16 and oxygen saturation is 99%.     Chief Complaint: COVID-19 Concerns    Patient's wife tested positive for COVID earlier today.  Patient has had chills for the last day.  Some malaise.  Denies any chest pain or shortness a breath.  Has been vaccinated boosted for COVID but never tested positive for COVID in the past.  Of note he does regularly take statins but he is currently out and would like a refill.  Patient advised that he can not take statins with Paxlovid.  He is aware of this.    Sinus Problem  The current episode started yesterday. The problem has been gradually worsening since onset. Associated symptoms include chills, congestion, sneezing and a sore throat. Treatments tried: Advil.       Constitution: Positive for chills.   HENT:  Positive for congestion and sore throat.    Allergic/Immunologic: Positive for sneezing.      Objective:     Physical Exam  Constitutional: Pt oriented to person, place, and time.  Non-toxic appearance.   Patient does not appear ill. No distress. normal  HENT: No icterus or facial swelling appreciated  Head: Normocephalic and atraumatic.   Nose: No congestion.   Pulmonary/Chest: Effort normal. No stridor. No respiratory distress. Cta b/l.   Abdominal: Normal appearance. Abdomen exhibits no distension.   Musculoskeletal:         General: No swelling.   Neurological: no focal deficit. Patient is alert and oriented to person, place, and time.   Skin: Skin is not diaphoretic and not pale. no jaundice  Psychiatric: Patients behavior is normal. Mood, judgment and thought content normal.     Assessment:     1. COVID-19    2. Chills    3. Other hyperlipidemia        Plan:       COVID-19  -     nirmatrelvir-ritonavir  (PAXLOVID) 300 mg (150 mg x 2)-100 mg copackaged tablets (EUA); Take 3 tablets by mouth 2 (two) times daily. Each dose contains 2 nirmatrelvir (pink tablets) and 1 ritonavir (white tablet). Take all 3 tablets together  Dispense: 30 tablet; Refill: 0    supportive care encouraged, ie Rest and hydration, OTC cold preparations / analgesics/ antipyretics)    Reviewed ER and isolation precautions    RTC PRN    Chills  -     SARS Coronavirus 2 Antigen, POCT Manual Read- POS    Other hyperlipidemia  Refill 90 days' worth of-     pravastatin (PRAVACHOL) 20 MG tablet; Take 1 tablet (20 mg total) by mouth once daily.  Dispense: 90 tablet; Refill: 0  Advised patient not to take statin therapy until he is complete with the Paxlovid therapy.            Patient Instructions   You have been prescribed Paxlovid treatment for COVID-19.      Even though your statin was refilled for 90 days It is advised that you hold your cholesterol-lowering medication/statin therapy during your treatment with Paxlovid and resuming after you completed the Paxlovid.      If you have any worsening or worrisome symptoms such as chest pain or shortness a breath or dizziness or for any other worrisome symptoms you must go to the ER immediately for further evaluation and management of symptoms.      It is also advised that you isolate for 5 days from symptom onset and then wear a mask around others from day 6 through 10

## 2024-10-04 ENCOUNTER — HOSPITAL ENCOUNTER (EMERGENCY)
Facility: OTHER | Age: 64
Discharge: HOME | End: 2024-10-04
Attending: EMERGENCY MEDICINE
Payer: COMMERCIAL

## 2024-10-04 VITALS
WEIGHT: 185 LBS | OXYGEN SATURATION: 97 % | RESPIRATION RATE: 15 BRPM | SYSTOLIC BLOOD PRESSURE: 123 MMHG | HEART RATE: 53 BPM | BODY MASS INDEX: 25.06 KG/M2 | TEMPERATURE: 98 F | HEIGHT: 72 IN | DIASTOLIC BLOOD PRESSURE: 60 MMHG

## 2024-10-04 DIAGNOSIS — R11.10 VOMITING: Primary | ICD-10-CM

## 2024-10-04 LAB
ALBUMIN SERPL BCP-MCNC: 4.5 G/DL (ref 3.5–5.2)
ALP SERPL-CCNC: 79 U/L (ref 55–135)
ALT SERPL W/O P-5'-P-CCNC: 17 U/L (ref 10–44)
ANION GAP SERPL CALC-SCNC: 14 MMOL/L (ref 8–16)
AST SERPL-CCNC: 26 U/L (ref 10–40)
BASOPHILS # BLD AUTO: 0.04 K/UL (ref 0–0.2)
BASOPHILS NFR BLD: 0.5 % (ref 0–1.9)
BILIRUB SERPL-MCNC: 0.6 MG/DL (ref 0.1–1)
BILIRUB UR QL STRIP: NEGATIVE
BNP SERPL-MCNC: 50 PG/ML (ref 0–99)
BUN SERPL-MCNC: 16 MG/DL (ref 8–23)
CALCIUM SERPL-MCNC: 11 MG/DL (ref 8.7–10.5)
CHLORIDE SERPL-SCNC: 104 MMOL/L (ref 95–110)
CLARITY UR: CLEAR
CO2 SERPL-SCNC: 22 MMOL/L (ref 23–29)
COLOR UR: YELLOW
CREAT SERPL-MCNC: 1 MG/DL (ref 0.5–1.4)
DIFFERENTIAL METHOD BLD: ABNORMAL
EOSINOPHIL # BLD AUTO: 0 K/UL (ref 0–0.5)
EOSINOPHIL NFR BLD: 0.2 % (ref 0–8)
ERYTHROCYTE [DISTWIDTH] IN BLOOD BY AUTOMATED COUNT: 12.1 % (ref 11.5–14.5)
EST. GFR  (NO RACE VARIABLE): >60 ML/MIN/1.73 M^2
GLUCOSE SERPL-MCNC: 157 MG/DL (ref 70–110)
GLUCOSE UR QL STRIP: NEGATIVE
HCT VFR BLD AUTO: 48.8 % (ref 40–54)
HGB BLD-MCNC: 17.1 G/DL (ref 14–18)
HGB UR QL STRIP: NEGATIVE
IMM GRANULOCYTES # BLD AUTO: 0.01 K/UL (ref 0–0.04)
IMM GRANULOCYTES NFR BLD AUTO: 0.1 % (ref 0–0.5)
KETONES UR QL STRIP: ABNORMAL
LEUKOCYTE ESTERASE UR QL STRIP: NEGATIVE
LIPASE SERPL-CCNC: 38 U/L (ref 4–60)
LYMPHOCYTES # BLD AUTO: 1.2 K/UL (ref 1–4.8)
LYMPHOCYTES NFR BLD: 14.9 % (ref 18–48)
MAGNESIUM SERPL-MCNC: 1.6 MG/DL (ref 1.6–2.6)
MCH RBC QN AUTO: 31.7 PG (ref 27–31)
MCHC RBC AUTO-ENTMCNC: 35 G/DL (ref 32–36)
MCV RBC AUTO: 90 FL (ref 82–98)
MONOCYTES # BLD AUTO: 0.2 K/UL (ref 0.3–1)
MONOCYTES NFR BLD: 3 % (ref 4–15)
NEUTROPHILS # BLD AUTO: 6.6 K/UL (ref 1.8–7.7)
NEUTROPHILS NFR BLD: 81.3 % (ref 38–73)
NITRITE UR QL STRIP: NEGATIVE
NRBC BLD-RTO: 0 /100 WBC
OHS QRS DURATION: 102 MS
OHS QTC CALCULATION: 407 MS
PH UR STRIP: 8 [PH] (ref 5–8)
PLATELET # BLD AUTO: 307 K/UL (ref 150–450)
PMV BLD AUTO: 10.6 FL (ref 9.2–12.9)
POTASSIUM SERPL-SCNC: 3.7 MMOL/L (ref 3.5–5.1)
PROT SERPL-MCNC: 7.6 G/DL (ref 6–8.4)
PROT UR QL STRIP: NEGATIVE
RBC # BLD AUTO: 5.4 M/UL (ref 4.6–6.2)
SODIUM SERPL-SCNC: 140 MMOL/L (ref 136–145)
SP GR UR STRIP: 1.01 (ref 1–1.03)
TROPONIN I SERPL DL<=0.01 NG/ML-MCNC: <0.006 NG/ML (ref 0–0.03)
TROPONIN I SERPL DL<=0.01 NG/ML-MCNC: <0.006 NG/ML (ref 0–0.03)
URN SPEC COLLECT METH UR: ABNORMAL
UROBILINOGEN UR STRIP-ACNC: NEGATIVE EU/DL
WBC # BLD AUTO: 8.07 K/UL (ref 3.9–12.7)

## 2024-10-04 PROCEDURE — 85025 COMPLETE CBC W/AUTO DIFF WBC: CPT | Performed by: EMERGENCY MEDICINE

## 2024-10-04 PROCEDURE — 96375 TX/PRO/DX INJ NEW DRUG ADDON: CPT

## 2024-10-04 PROCEDURE — 96361 HYDRATE IV INFUSION ADD-ON: CPT

## 2024-10-04 PROCEDURE — 63600175 PHARM REV CODE 636 W HCPCS: Performed by: EMERGENCY MEDICINE

## 2024-10-04 PROCEDURE — 99284 EMERGENCY DEPT VISIT MOD MDM: CPT | Mod: 25

## 2024-10-04 PROCEDURE — 93010 ELECTROCARDIOGRAM REPORT: CPT | Mod: ,,, | Performed by: INTERNAL MEDICINE

## 2024-10-04 PROCEDURE — 81003 URINALYSIS AUTO W/O SCOPE: CPT | Performed by: EMERGENCY MEDICINE

## 2024-10-04 PROCEDURE — 84484 ASSAY OF TROPONIN QUANT: CPT | Performed by: EMERGENCY MEDICINE

## 2024-10-04 PROCEDURE — 83690 ASSAY OF LIPASE: CPT | Performed by: EMERGENCY MEDICINE

## 2024-10-04 PROCEDURE — 80053 COMPREHEN METABOLIC PANEL: CPT | Performed by: EMERGENCY MEDICINE

## 2024-10-04 PROCEDURE — 96374 THER/PROPH/DIAG INJ IV PUSH: CPT

## 2024-10-04 PROCEDURE — 84484 ASSAY OF TROPONIN QUANT: CPT | Mod: 91 | Performed by: EMERGENCY MEDICINE

## 2024-10-04 PROCEDURE — 93005 ELECTROCARDIOGRAM TRACING: CPT

## 2024-10-04 PROCEDURE — 25000003 PHARM REV CODE 250: Performed by: EMERGENCY MEDICINE

## 2024-10-04 PROCEDURE — 83735 ASSAY OF MAGNESIUM: CPT | Performed by: EMERGENCY MEDICINE

## 2024-10-04 PROCEDURE — 83880 ASSAY OF NATRIURETIC PEPTIDE: CPT | Performed by: EMERGENCY MEDICINE

## 2024-10-04 RX ORDER — SODIUM CHLORIDE 9 MG/ML
1000 INJECTION, SOLUTION INTRAVENOUS
Status: COMPLETED | OUTPATIENT
Start: 2024-10-04 | End: 2024-10-04

## 2024-10-04 RX ORDER — LORAZEPAM 1 MG/1
0.5 TABLET ORAL EVERY 6 HOURS PRN
Qty: 10 TABLET | Refills: 0 | Status: SHIPPED | OUTPATIENT
Start: 2024-10-04 | End: 2024-11-03

## 2024-10-04 RX ORDER — ONDANSETRON HYDROCHLORIDE 2 MG/ML
4 INJECTION, SOLUTION INTRAVENOUS
Status: COMPLETED | OUTPATIENT
Start: 2024-10-04 | End: 2024-10-04

## 2024-10-04 RX ORDER — DROPERIDOL 2.5 MG/ML
1.25 INJECTION, SOLUTION INTRAMUSCULAR; INTRAVENOUS
Status: COMPLETED | OUTPATIENT
Start: 2024-10-04 | End: 2024-10-04

## 2024-10-04 RX ORDER — ONDANSETRON 4 MG/1
4 TABLET, ORALLY DISINTEGRATING ORAL EVERY 6 HOURS PRN
Qty: 10 TABLET | Refills: 0 | Status: SHIPPED | OUTPATIENT
Start: 2024-10-04 | End: 2024-10-11

## 2024-10-04 RX ORDER — DIAZEPAM 5 MG/1
5 TABLET ORAL
Status: COMPLETED | OUTPATIENT
Start: 2024-10-04 | End: 2024-10-04

## 2024-10-04 RX ADMIN — DROPERIDOL 1.25 MG: 2.5 INJECTION, SOLUTION INTRAMUSCULAR; INTRAVENOUS at 08:10

## 2024-10-04 RX ADMIN — SODIUM CHLORIDE 1000 ML: 9 INJECTION, SOLUTION INTRAVENOUS at 06:10

## 2024-10-04 RX ADMIN — DIAZEPAM 5 MG: 5 TABLET ORAL at 06:10

## 2024-10-04 RX ADMIN — ONDANSETRON 4 MG: 2 INJECTION INTRAMUSCULAR; INTRAVENOUS at 06:10

## 2024-10-04 NOTE — DISCHARGE INSTRUCTIONS
Use the medication as needed.  If symptoms significantly worsen return to the emergency department for re-evaluation.    I also recommend following up with Cardiology as an outpatient.

## 2024-10-04 NOTE — ED TRIAGE NOTES
Dandy Gale, a 64 y.o. male presents to the ED complaining of shortness of breath, nausea, vomiting and anxiety that began last night at 2300.    Patient is A&Ox4. Denies any other complaints. ED workup in progress. Safety measures in place; side rails up x2. Call light within pt reach. Plan of care ongoing    Chief Complaint   Patient presents with    Vomiting     Vomiting since last night, 7 or 8's times since last night

## 2024-10-04 NOTE — ED PROVIDER NOTES
Source of History:  The patient    Chief complaint:  Vomiting (Vomiting since last night, 7 or 8's times since last night)      HPI:  Dandy Gale is a 64 y.o. male  who complains of epigastric pain/nausea associated vomiting since last night in the last 10 hours.  States he had a very stressful issue at work and feels it is anxiety.  States he had difficulty sleeping last night due to the nausea and vomiting.  States he has never been diagnosed with anxiety but has been self medicating with marijuana daily for close to a year.  Denies any dysuria or diarrhea.  Denies any fevers or chills.  Prior to the stressful issue at work he states he was having a normal day.  Denies any worsening issues with exercise.    This is the extent to the patients complaints today here in the emergency department.    ROS:   See HPI.    Review of patient's allergies indicates:   Allergen Reactions    Shellfish containing products        PMH:  As per HPI and below:  Past Medical History:   Diagnosis Date    Fever blister 1985    Not recently    High blood cholesterol     Hypertension     Joint pain 2020    Probably some arthritis    Squamous cell carcinoma 2016    LEFT MID FOREARM:     Past Surgical History:   Procedure Laterality Date    COLONOSCOPY N/A 2020    Procedure: COLONOSCOPY;  Surgeon: Paulette George MD;  Location: [facility] ([address] [place] [address]);  Service: Endoscopy;  Laterality: N/A;    FRACTURE SURGERY      l shoulder plate       l thumb plate insertion      SKIN BIOPSY  1995    Should be in chart       Social History     Tobacco Use    Smoking status: Former     Current packs/day: 0.00     Average packs/day: 1 pack/day for 10.0 years (10.0 ttl pk-yrs)     Types: Cigarettes     Start date: 1980     Quit date: 1990     Years since quittin.7    Smokeless tobacco: Former     Quit date: 1990   Substance Use Topics    Alcohol use: Yes     Alcohol/week: 2.0 standard drinks of alcohol      Types: 2 Glasses of wine per week    Drug use: Yes     Frequency: 7.0 times per week     Types: Marijuana       Physical Exam:    BP (!) 161/74   Pulse (!) 45   Temp 97.8 °F (36.6 °C) (Oral)   Resp 15   Ht 6' (1.829 m)   Wt 83.9 kg (185 lb)   SpO2 97%   BMI 25.09 kg/m²   Nursing note and vital signs reviewed.  Constitutional:  Uncomfortable appearing.  Nontoxic  Eyes:  Conjunctiva normal.  Extraocular muscles are intact.  Cardiovascular: Regular rate and rhythm.  No murmurs. No gallops. No rubs  Respiratory: Clear to auscultation bilaterally.  Good air movement.  No wheezes.  No rhonchi. No rales. No accessory muscle use..  Abdomen:  Epigastric tenderness to palpation.  Not distended.  Nontender.  No guarding.  No rebound. Non-peritoneal.  Neuro: alert. At baseline.  No focal neurological deficits.  MSK: No deformities.      Summary of Previous Medical Records:        Differential Dx/ MDM/ Workup:  64-year-old male with epigastric pain with vomiting.  Differential could include acute coronary syndrome, acute anxiety.  Also possibility of pancreatitis and gastritis although I suspect less likely.  I have considered but do not suspect cholelithiasis/cholecystitis.  No evidence of appendicitis or diverticulitis on examination.  History is inconsistent with COVID or pneumonia.  I have also considered but do not suspect PE.  Labs were initiated.  Will review EKG and observe.      ED Course as of 10/04/24 1015   Fri Oct 04, 2024   0625 EKG 12-lead  EKG independently interpreted by myself shows sinus bradycardia rate of 52, normal intervals, narrow QRS, no acute ST T wave abnormalities.  Overall impression sinus bradycardia otherwise no acute disease. [SM]   0625 WBC: 8.07 [SM]   0625 Hemoglobin: 17.1 [SM]   0625 Platelet Count: 307 [SM]   0652 Leukocyte Esterase, UA: Negative [SM]   0652 NITRITE UA: Negative [SM]   0737 Troponin I: <0.006 [SM]   0737 BNP: 50 [SM]   0737 Lipase: 38 [SM]   0737 Magnesium : 1.6 [SM]    1013 Troponin I: <0.006 []   1013 On re-evaluation patient is feeling much better.  He is asymptomatic.  I feel at this time he can be safely discharged home.  I suspect likely panic attack with also cannabinoid hyperemesis.  I have explained to him that I still feel he should be evaluated by Cardiology as an outpatient for his heart and return precautions given.      No further workup is indicated in the emergency department today.  I updated pt regarding results and I counseled pt regarding supportive care measures.  Diagnosis and treatment plan explained to patient. I have answered all questions and the patient is satisfied with the plan of care. Patient discharged home in stable condition.  [SM]      ED Course User Index  [SM] Nicanor David,                  Diagnostic Impression:    1. Vomiting         ED Disposition Condition    Discharge Stable            ED Prescriptions       Medication Sig Dispense Start Date End Date Auth. Provider    ondansetron (ZOFRAN-ODT) 4 MG TbDL Take 1 tablet (4 mg total) by mouth every 6 (six) hours as needed (nausea vomiting). 10 tablet 10/4/2024 10/11/2024 Nicanor David, DO    LORazepam (ATIVAN) 1 MG tablet Take 0.5 tablets (0.5 mg total) by mouth every 6 (six) hours as needed for Anxiety. 10 tablet 10/4/2024 11/3/2024 Nicanor David, DO          Follow-up Information       Follow up With Specialties Details Why Contact Info    Chuy Ventura MD Interventional Cardiology, Nuclear Medicine, Cardiovascular Disease, Cardiology Schedule an appointment as soon as possible for a visit in 1 week  [address] [facility] [address]  [facility] [id number] [phone]      [facility] Emergency Dept Emergency Medicine  If symptoms worsen [address] [facility] [id number] [phone]    Lawrence Denson MD Family Medicine Schedule an appointment as soon as possible for a visit   [address] [facility]  [address]  [facility] [id number] [phone]                Nicanor David, DO  10/04/24 1015

## 2024-10-05 ENCOUNTER — HOSPITAL ENCOUNTER (EMERGENCY)
Facility: OTHER | Age: 64
Discharge: HOME | End: 2024-10-05
Attending: EMERGENCY MEDICINE
Payer: COMMERCIAL

## 2024-10-05 VITALS
DIASTOLIC BLOOD PRESSURE: 72 MMHG | BODY MASS INDEX: 28.63 KG/M2 | RESPIRATION RATE: 17 BRPM | WEIGHT: 200 LBS | HEART RATE: 58 BPM | OXYGEN SATURATION: 98 % | SYSTOLIC BLOOD PRESSURE: 151 MMHG | HEIGHT: 70 IN | TEMPERATURE: 98 F

## 2024-10-05 DIAGNOSIS — S20.211A RIB CONTUSION, RIGHT, INITIAL ENCOUNTER: Primary | ICD-10-CM

## 2024-10-05 DIAGNOSIS — R07.81 RIB PAIN: ICD-10-CM

## 2024-10-05 PROCEDURE — 94799 UNLISTED PULMONARY SVC/PX: CPT

## 2024-10-05 PROCEDURE — 99284 EMERGENCY DEPT VISIT MOD MDM: CPT | Mod: 25

## 2024-10-05 PROCEDURE — 25000003 PHARM REV CODE 250: Performed by: PHYSICIAN ASSISTANT

## 2024-10-05 RX ORDER — METHOCARBAMOL 750 MG/1
1500 TABLET, FILM COATED ORAL 3 TIMES DAILY
Qty: 30 TABLET | Refills: 0 | Status: SHIPPED | OUTPATIENT
Start: 2024-10-05 | End: 2024-10-10

## 2024-10-05 RX ORDER — KETOROLAC TROMETHAMINE 10 MG/1
10 TABLET, FILM COATED ORAL EVERY 6 HOURS
Qty: 12 TABLET | Refills: 0 | Status: SHIPPED | OUTPATIENT
Start: 2024-10-05 | End: 2024-10-08

## 2024-10-05 RX ORDER — HYDROCODONE BITARTRATE AND ACETAMINOPHEN 5; 325 MG/1; MG/1
1 TABLET ORAL
Status: COMPLETED | OUTPATIENT
Start: 2024-10-05 | End: 2024-10-05

## 2024-10-05 RX ORDER — KETOROLAC TROMETHAMINE 10 MG/1
10 TABLET, FILM COATED ORAL
Status: COMPLETED | OUTPATIENT
Start: 2024-10-05 | End: 2024-10-05

## 2024-10-05 RX ORDER — LIDOCAINE 50 MG/G
1 PATCH TOPICAL
Status: DISCONTINUED | OUTPATIENT
Start: 2024-10-05 | End: 2024-10-05 | Stop reason: HOSPADM

## 2024-10-05 RX ORDER — LIDOCAINE 50 MG/G
1 PATCH TOPICAL DAILY
Qty: 15 PATCH | Refills: 0 | Status: SHIPPED | OUTPATIENT
Start: 2024-10-05

## 2024-10-05 RX ADMIN — LIDOCAINE 1 PATCH: 50 PATCH CUTANEOUS at 08:10

## 2024-10-05 RX ADMIN — HYDROCODONE BITARTRATE AND ACETAMINOPHEN 1 TABLET: 5; 325 TABLET ORAL at 09:10

## 2024-10-05 RX ADMIN — KETOROLAC TROMETHAMINE 10 MG: 10 TABLET, FILM COATED ORAL at 09:10

## 2024-10-05 NOTE — Clinical Note
"Dandy Gale (Jeff) was seen and treated in our emergency department on 10/5/2024.  He may return with limitations on 10/07/2024.       Sincerely,      Evelia Ramos PA"
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
## 2024-10-07 ENCOUNTER — OFFICE VISIT (OUTPATIENT)
Dept: INTERNAL MEDICINE | Facility: CLINIC | Age: 64
End: 2024-10-07
Payer: COMMERCIAL

## 2024-10-07 VITALS
HEIGHT: 70 IN | BODY MASS INDEX: 25.34 KG/M2 | OXYGEN SATURATION: 96 % | WEIGHT: 177 LBS | DIASTOLIC BLOOD PRESSURE: 88 MMHG | SYSTOLIC BLOOD PRESSURE: 158 MMHG | HEART RATE: 61 BPM

## 2024-10-07 DIAGNOSIS — F41.9 ANXIETY: ICD-10-CM

## 2024-10-07 DIAGNOSIS — N40.1 BPH WITH URINARY OBSTRUCTION: ICD-10-CM

## 2024-10-07 DIAGNOSIS — I10 PRIMARY HYPERTENSION: ICD-10-CM

## 2024-10-07 DIAGNOSIS — Z12.5 PROSTATE CANCER SCREENING: ICD-10-CM

## 2024-10-07 DIAGNOSIS — N13.8 BPH WITH URINARY OBSTRUCTION: ICD-10-CM

## 2024-10-07 DIAGNOSIS — E78.49 OTHER HYPERLIPIDEMIA: ICD-10-CM

## 2024-10-07 DIAGNOSIS — F31.81 BIPOLAR II DISORDER: ICD-10-CM

## 2024-10-07 DIAGNOSIS — Z00.00 WELLNESS EXAMINATION: Primary | ICD-10-CM

## 2024-10-07 PROCEDURE — 3008F BODY MASS INDEX DOCD: CPT | Mod: CPTII,S$GLB,,

## 2024-10-07 PROCEDURE — 3077F SYST BP >= 140 MM HG: CPT | Mod: CPTII,S$GLB,,

## 2024-10-07 PROCEDURE — 99214 OFFICE O/P EST MOD 30 MIN: CPT | Mod: S$GLB,,,

## 2024-10-07 PROCEDURE — 1101F PT FALLS ASSESS-DOCD LE1/YR: CPT | Mod: CPTII,S$GLB,,

## 2024-10-07 PROCEDURE — 1159F MED LIST DOCD IN RCRD: CPT | Mod: CPTII,S$GLB,,

## 2024-10-07 PROCEDURE — 3288F FALL RISK ASSESSMENT DOCD: CPT | Mod: CPTII,S$GLB,,

## 2024-10-07 PROCEDURE — 99999 PR PBB SHADOW E&M-EST. PATIENT-LVL III: CPT | Mod: PBBFAC,,,

## 2024-10-07 PROCEDURE — 3079F DIAST BP 80-89 MM HG: CPT | Mod: CPTII,S$GLB,,

## 2024-10-07 RX ORDER — AMLODIPINE BESYLATE 5 MG/1
5 TABLET ORAL DAILY
Qty: 90 TABLET | Refills: 3 | Status: SHIPPED | OUTPATIENT
Start: 2024-10-07 | End: 2025-10-07

## 2024-10-07 RX ORDER — FINASTERIDE 5 MG/1
5 TABLET, FILM COATED ORAL DAILY
Qty: 90 TABLET | Refills: 3 | Status: SHIPPED | OUTPATIENT
Start: 2024-10-07 | End: 2025-10-07

## 2024-10-08 ENCOUNTER — PATIENT MESSAGE (OUTPATIENT)
Dept: INTERNAL MEDICINE | Facility: CLINIC | Age: 64
End: 2024-10-08
Payer: COMMERCIAL
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
## 2024-10-09 ENCOUNTER — PATIENT MESSAGE (OUTPATIENT)
Dept: INTERNAL MEDICINE | Facility: CLINIC | Age: 64
End: 2024-10-09
Payer: COMMERCIAL

## 2024-10-09 ENCOUNTER — LAB VISIT (OUTPATIENT)
Dept: LAB | Facility: OTHER | Age: 64
End: 2024-10-09
Payer: COMMERCIAL

## 2024-10-09 DIAGNOSIS — E78.49 OTHER HYPERLIPIDEMIA: ICD-10-CM

## 2024-10-09 DIAGNOSIS — Z00.00 WELLNESS EXAMINATION: ICD-10-CM

## 2024-10-09 DIAGNOSIS — Z12.5 PROSTATE CANCER SCREENING: ICD-10-CM

## 2024-10-09 DIAGNOSIS — R97.20 ELEVATED PSA: Primary | ICD-10-CM

## 2024-10-09 LAB
ALBUMIN SERPL BCP-MCNC: 4 G/DL (ref 3.5–5.2)
ALP SERPL-CCNC: 71 U/L (ref 55–135)
ALT SERPL W/O P-5'-P-CCNC: 12 U/L (ref 10–44)
ANION GAP SERPL CALC-SCNC: 10 MMOL/L (ref 8–16)
AST SERPL-CCNC: 17 U/L (ref 10–40)
BASOPHILS # BLD AUTO: 0.05 K/UL (ref 0–0.2)
BASOPHILS NFR BLD: 0.9 % (ref 0–1.9)
BILIRUB SERPL-MCNC: 0.7 MG/DL (ref 0.1–1)
BUN SERPL-MCNC: 15 MG/DL (ref 8–23)
CALCIUM SERPL-MCNC: 9.8 MG/DL (ref 8.7–10.5)
CHLORIDE SERPL-SCNC: 105 MMOL/L (ref 95–110)
CHOLEST SERPL-MCNC: 202 MG/DL (ref 120–199)
CHOLEST/HDLC SERPL: 4.2 {RATIO} (ref 2–5)
CO2 SERPL-SCNC: 25 MMOL/L (ref 23–29)
COMPLEXED PSA SERPL-MCNC: 8.4 NG/ML (ref 0–4)
CREAT SERPL-MCNC: 0.9 MG/DL (ref 0.5–1.4)
DIFFERENTIAL METHOD BLD: ABNORMAL
EOSINOPHIL # BLD AUTO: 0.2 K/UL (ref 0–0.5)
EOSINOPHIL NFR BLD: 3.4 % (ref 0–8)
ERYTHROCYTE [DISTWIDTH] IN BLOOD BY AUTOMATED COUNT: 12.3 % (ref 11.5–14.5)
EST. GFR  (NO RACE VARIABLE): >60 ML/MIN/1.73 M^2
ESTIMATED AVG GLUCOSE: 103 MG/DL (ref 68–131)
GLUCOSE SERPL-MCNC: 108 MG/DL (ref 70–110)
HBA1C MFR BLD: 5.2 % (ref 4–5.6)
HCT VFR BLD AUTO: 44.1 % (ref 40–54)
HDLC SERPL-MCNC: 48 MG/DL (ref 40–75)
HDLC SERPL: 23.8 % (ref 20–50)
HGB BLD-MCNC: 15.4 G/DL (ref 14–18)
IMM GRANULOCYTES # BLD AUTO: 0.01 K/UL (ref 0–0.04)
IMM GRANULOCYTES NFR BLD AUTO: 0.2 % (ref 0–0.5)
LDLC SERPL CALC-MCNC: 134.8 MG/DL (ref 63–159)
LYMPHOCYTES # BLD AUTO: 1.6 K/UL (ref 1–4.8)
LYMPHOCYTES NFR BLD: 28.3 % (ref 18–48)
MCH RBC QN AUTO: 31.9 PG (ref 27–31)
MCHC RBC AUTO-ENTMCNC: 34.9 G/DL (ref 32–36)
MCV RBC AUTO: 91 FL (ref 82–98)
MONOCYTES # BLD AUTO: 0.6 K/UL (ref 0.3–1)
MONOCYTES NFR BLD: 11.5 % (ref 4–15)
NEUTROPHILS # BLD AUTO: 3.1 K/UL (ref 1.8–7.7)
NEUTROPHILS NFR BLD: 55.7 % (ref 38–73)
NONHDLC SERPL-MCNC: 154 MG/DL
NRBC BLD-RTO: 0 /100 WBC
PLATELET # BLD AUTO: 270 K/UL (ref 150–450)
PMV BLD AUTO: 11.4 FL (ref 9.2–12.9)
POTASSIUM SERPL-SCNC: 3.8 MMOL/L (ref 3.5–5.1)
PROT SERPL-MCNC: 7 G/DL (ref 6–8.4)
RBC # BLD AUTO: 4.83 M/UL (ref 4.6–6.2)
SODIUM SERPL-SCNC: 140 MMOL/L (ref 136–145)
TRIGL SERPL-MCNC: 96 MG/DL (ref 30–150)
WBC # BLD AUTO: 5.55 K/UL (ref 3.9–12.7)

## 2024-10-09 PROCEDURE — 84153 ASSAY OF PSA TOTAL: CPT

## 2024-10-09 PROCEDURE — 83036 HEMOGLOBIN GLYCOSYLATED A1C: CPT

## 2024-10-09 PROCEDURE — 36415 COLL VENOUS BLD VENIPUNCTURE: CPT

## 2024-10-09 PROCEDURE — 80061 LIPID PANEL: CPT

## 2024-10-09 PROCEDURE — 85025 COMPLETE CBC W/AUTO DIFF WBC: CPT

## 2024-10-09 PROCEDURE — 80053 COMPREHEN METABOLIC PANEL: CPT

## 2024-10-14 ENCOUNTER — OFFICE VISIT (OUTPATIENT)
Dept: UROLOGY | Facility: CLINIC | Age: 64
End: 2024-10-14
Payer: COMMERCIAL

## 2024-10-14 VITALS
SYSTOLIC BLOOD PRESSURE: 139 MMHG | HEART RATE: 58 BPM | WEIGHT: 176.38 LBS | BODY MASS INDEX: 25.25 KG/M2 | HEIGHT: 70 IN | DIASTOLIC BLOOD PRESSURE: 85 MMHG

## 2024-10-14 DIAGNOSIS — N13.8 BPH WITH URINARY OBSTRUCTION: ICD-10-CM

## 2024-10-14 DIAGNOSIS — R97.20 ELEVATED PSA: Primary | ICD-10-CM

## 2024-10-14 DIAGNOSIS — N40.1 BPH WITH URINARY OBSTRUCTION: ICD-10-CM

## 2024-10-14 PROBLEM — S61.219A LACERATION OF SKIN OF FINGER: Status: RESOLVED | Noted: 2023-03-29 | Resolved: 2024-10-14

## 2024-10-14 PROBLEM — R35.1 NOCTURIA: Status: RESOLVED | Noted: 2022-09-02 | Resolved: 2024-10-14

## 2024-10-14 PROBLEM — Z87.891 FORMER SMOKER: Status: RESOLVED | Noted: 2022-02-14 | Resolved: 2024-10-14

## 2024-10-14 PROBLEM — Z12.11 SCREENING FOR MALIGNANT NEOPLASM OF COLON: Status: RESOLVED | Noted: 2020-02-12 | Resolved: 2024-10-14

## 2024-10-14 PROBLEM — R33.9 INCOMPLETE BLADDER EMPTYING: Status: RESOLVED | Noted: 2022-03-15 | Resolved: 2024-10-14

## 2024-10-14 PROCEDURE — 3008F BODY MASS INDEX DOCD: CPT | Mod: CPTII,S$GLB,, | Performed by: UROLOGY

## 2024-10-14 PROCEDURE — 1101F PT FALLS ASSESS-DOCD LE1/YR: CPT | Mod: CPTII,S$GLB,, | Performed by: UROLOGY

## 2024-10-14 PROCEDURE — 3079F DIAST BP 80-89 MM HG: CPT | Mod: CPTII,S$GLB,, | Performed by: UROLOGY

## 2024-10-14 PROCEDURE — 3288F FALL RISK ASSESSMENT DOCD: CPT | Mod: CPTII,S$GLB,, | Performed by: UROLOGY

## 2024-10-14 PROCEDURE — 3075F SYST BP GE 130 - 139MM HG: CPT | Mod: CPTII,S$GLB,, | Performed by: UROLOGY

## 2024-10-14 PROCEDURE — 3044F HG A1C LEVEL LT 7.0%: CPT | Mod: CPTII,S$GLB,, | Performed by: UROLOGY

## 2024-10-14 PROCEDURE — 99999 PR PBB SHADOW E&M-EST. PATIENT-LVL IV: CPT | Mod: PBBFAC,,, | Performed by: UROLOGY

## 2024-10-14 PROCEDURE — 1160F RVW MEDS BY RX/DR IN RCRD: CPT | Mod: CPTII,S$GLB,, | Performed by: UROLOGY

## 2024-10-14 PROCEDURE — 99214 OFFICE O/P EST MOD 30 MIN: CPT | Mod: S$GLB,,, | Performed by: UROLOGY

## 2024-10-14 PROCEDURE — 1159F MED LIST DOCD IN RCRD: CPT | Mod: CPTII,S$GLB,, | Performed by: UROLOGY

## 2024-10-14 RX ORDER — TRIAMCINOLONE ACETONIDE 1 MG/G
PASTE DENTAL
COMMUNITY
Start: 2024-10-06

## 2024-10-14 NOTE — PROGRESS NOTES
CHIEF COMPLAINT:    Mr. Gale is a 64 y.o. male presenting for a consultation at the request of . Ward Alvarez. Patient presents with elevated PSA.    PRESENTING ILLNESS:    Dandy Gale is a 64 y.o. male with history of elevated PSA. Previous patient of Dr. Rhodes.  He had a negative uronav biopsy in 2022 when his PSA was 7.9.  Was not on finasteride at that time.      He has LUTS.  Nocturia x 2.  + incomplete emptying.  Was started on  finasteride and uroxatral by Dr. Rhodes.  However, he's been lost to follow up and was not taking finasteride.  Was just restarted by his PCP.        REVIEW OF SYSTEMS:    Dandy Gale denies headache, blurred vision, fever, nausea, vomiting, chills, abdominal pain, chest pain, sore throat, bleeding per rectum, cough, SOB, recent loss of consciousness, recent mental status changes, seizures, dizziness, or upper or lower extremity weakness.    JIMI  1. 4  2. 5  3. 5  4. 5  5. 5      PATIENT HISTORY:    Past Medical History:   Diagnosis Date    Fever blister 01/01/1985    Not recently    Former smoker 02/14/2022    High blood cholesterol     Hypertension     Joint pain 01/01/2020    Probably some arthritis    Squamous cell carcinoma 02/2016    LEFT MID FOREARM:       Past Surgical History:   Procedure Laterality Date    COLONOSCOPY N/A 2/12/2020    Procedure: COLONOSCOPY;  Surgeon: Paulette George MD;  Location: [address];  Service: Endoscopy;  Laterality: N/A;    FRACTURE SURGERY      l shoulder plate       l thumb plate insertion      SKIN BIOPSY  1/1/1995    Should be in chart       Family History   Problem Relation Name Age of Onset    Hypertension Mother Rosa     Depression Mother Rosa     Prostate cancer Father Gene     Hearing loss Father Gene     Heart disease Maternal Grandmother      Heart disease Maternal Grandfather Corey     Cancer Paternal Grandmother Gwendolyn         liver? Dx later stage    Heart attack Paternal Grandfather          poor  diet    Melanoma Neg Hx         Social History     Socioeconomic History    Marital status:    Tobacco Use    Smoking status: Former     Current packs/day: 0.00     Average packs/day: 1 pack/day for 10.0 years (10.0 ttl pk-yrs)     Types: Cigarettes     Start date: 1980     Quit date: 1990     Years since quittin.8    Smokeless tobacco: Former     Quit date: 1990   Substance and Sexual Activity    Alcohol use: Yes     Alcohol/week: 2.0 standard drinks of alcohol     Types: 2 Glasses of wine per week    Drug use: Yes     Frequency: 7.0 times per week     Types: Marijuana    Sexual activity: Yes     Partners: Female     Birth control/protection: Post-menopausal     Social Drivers of Health     Financial Resource Strain: Low Risk  (10/13/2024)    Overall Financial Resource Strain (CARDIA)     Difficulty of Paying Living Expenses: Not hard at all   Food Insecurity: Unknown (10/13/2024)    Hunger Vital Sign     Worried About Running Out of Food in the Last Year: Never true   Physical Activity: Insufficiently Active (10/13/2024)    Exercise Vital Sign     Days of Exercise per Week: 3 days     Minutes of Exercise per Session: 40 min   Stress: Stress Concern Present (10/13/2024)    [place] [employer] - Occupational Stress Questionnaire     Feeling of Stress : Rather much   Housing Stability: Unknown (10/13/2024)    Housing Stability Vital Sign     Unable to Pay for Housing in the Last Year: No       Allergies:  Shellfish containing products    Medications:    Current Outpatient Medications:     triamcinolone acetonide 0.1% (KENALOG) 0.1 % paste, SMARTSIG:TO TEETH 3 Times Daily, Disp: , Rfl:     acyclovir (ZOVIRAX) 400 MG tablet, Take 1 tablet (400 mg total) by mouth 2 (two) times daily. Take it for 5 days. Refills as needed. for 5 days, Disp: 10 tablet, Rfl: 3    alfuzosin (UROXATRAL) 10 mg Tb24, TAKE 1 TABLET(10 MG) BY MOUTH DAILY WITH BREAKFAST, Disp: 90 tablet, Rfl: 0     amLODIPine (NORVASC) 5 MG tablet, Take 1 tablet (5 mg total) by mouth once daily., Disp: 90 tablet, Rfl: 3    betamethasone dipropionate (DIPROLENE) 0.05 % ointment, Apply topically 2 (two) times daily. (Patient taking differently: Apply topically 2 (two) times daily. prn), Disp: 15 g, Rfl: 0    finasteride (PROSCAR) 5 mg tablet, Take 1 tablet (5 mg total) by mouth once daily., Disp: 90 tablet, Rfl: 3    multivitamin capsule, Take 1 capsule by mouth once daily., Disp: , Rfl:     pravastatin (PRAVACHOL) 20 MG tablet, Take 1 tablet (20 mg total) by mouth once daily. (Patient not taking: Reported on 10/7/2024), Disp: 90 tablet, Rfl: 0    PHYSICAL EXAMINATION:    The patient generally appears in good health, is appropriately interactive, and is in no apparent distress.     Eyes: anicteric sclerae, moist conjunctivae; no lid-lag; PERRLA     HENT: Atraumatic; oropharynx clear with moist mucous membranes and no mucosal ulcerations;normal hard and soft palate.  No evidence of lymphadenopathy.    Neck: Trachea midline.  No thyromegaly.    Skin: No lesions.    Mental: Cooperative with normal affect.  Is oriented to time, place, and person.    Neuro: Grossly intact.    Chest: Normal inspiratory effort.   No accessory muscles.  No audible wheezes.  Respirations symmetric on inspiration and expiration.    Heart: Regular rhythm.      Abdomen:  Soft, non-tender. No masses or organomegaly. Bladder is not palpable. No evidence of flank discomfort. No evidence of inguinal hernia.    Genitourinary: The penis is not circumcised with no evidence of plaques or induration. The urethral meatus is normal. The testes, epididymides, and cord structures are normal in size and contour bilaterally. The scrotum is normal in size and contour.    Normal anal sphincter tone. No rectal mass.    The prostate is 50 g. Normal landmarks. Lateral sulci. Median furrow intact.  No nodularity or induration. Seminal vesicles are normal.    Extremities: No  clubbing, cyanosis, or edema      LABS:    On finasteride since 10/2024  Lab Results   Component Value Date    PSA 8.4 (H) 10/09/2024    PSA 7.9 (H) 02/14/2022    PSA 5.5 (H) 07/18/2018    PSADIAG 4.8 (H) 08/18/2022    PSADIAG 4.1 (H) 04/14/2016       IMPRESSION:    Encounter Diagnoses   Name Primary?    Elevated PSA Yes    BPH with urinary obstruction          PLAN:    1. Discussed that his PSA is elevated.  However, it is not elevated from his baseline when he had the negative uronav biopsy.  Discussed that he's been lost to follow up and should be seen every 6 months for this.  2. Discussed options for his LUTS.  Continue uroxatral and finasteride.  3. RTC 6 months with a PSA.  If his voiding does not improve now that he has restarted finasteride, he may consider surgical options.    Copy to:

## 2025-02-10 ENCOUNTER — PATIENT MESSAGE (OUTPATIENT)
Dept: INTERNAL MEDICINE | Facility: CLINIC | Age: 65
End: 2025-02-10

## 2025-02-10 ENCOUNTER — OFFICE VISIT (OUTPATIENT)
Dept: INTERNAL MEDICINE | Facility: CLINIC | Age: 65
End: 2025-02-10
Payer: COMMERCIAL

## 2025-02-10 VITALS
DIASTOLIC BLOOD PRESSURE: 80 MMHG | HEIGHT: 70 IN | HEART RATE: 57 BPM | BODY MASS INDEX: 26.89 KG/M2 | SYSTOLIC BLOOD PRESSURE: 144 MMHG | OXYGEN SATURATION: 96 % | WEIGHT: 187.81 LBS

## 2025-02-10 DIAGNOSIS — I10 PRIMARY HYPERTENSION: Primary | ICD-10-CM

## 2025-02-10 PROCEDURE — 1159F MED LIST DOCD IN RCRD: CPT | Mod: CPTII,S$GLB,,

## 2025-02-10 PROCEDURE — 1126F AMNT PAIN NOTED NONE PRSNT: CPT | Mod: CPTII,S$GLB,,

## 2025-02-10 PROCEDURE — 99999 PR PBB SHADOW E&M-EST. PATIENT-LVL III: CPT | Mod: PBBFAC,,,

## 2025-02-10 PROCEDURE — 99214 OFFICE O/P EST MOD 30 MIN: CPT | Mod: S$GLB,,,

## 2025-02-10 PROCEDURE — 3077F SYST BP >= 140 MM HG: CPT | Mod: CPTII,S$GLB,,

## 2025-02-10 PROCEDURE — 3288F FALL RISK ASSESSMENT DOCD: CPT | Mod: CPTII,S$GLB,,

## 2025-02-10 PROCEDURE — 3079F DIAST BP 80-89 MM HG: CPT | Mod: CPTII,S$GLB,,

## 2025-02-10 PROCEDURE — 1101F PT FALLS ASSESS-DOCD LE1/YR: CPT | Mod: CPTII,S$GLB,,

## 2025-02-10 PROCEDURE — 3008F BODY MASS INDEX DOCD: CPT | Mod: CPTII,S$GLB,,

## 2025-02-10 RX ORDER — LOSARTAN POTASSIUM 25 MG/1
25 TABLET ORAL DAILY
Qty: 90 TABLET | Refills: 3 | Status: SHIPPED | OUTPATIENT
Start: 2025-02-10 | End: 2026-02-10

## 2025-02-10 NOTE — PROGRESS NOTES
CHIEF COMPLAINT     Chief Complaint   Patient presents with    Hypertension       HPI     Dandy Gale is a 64 y.o. male who presents for xxx today.    PCP is Lawrence Denson MD, patient is known to me. This note was generated with the assistance of ambient listening technology. Verbal consent was obtained by the patient and accompanying visitor(s) for the recording of patient appointment to facilitate this note. I attest to having reviewed and edited the generated note for accuracy, though some syntax or spelling errors may persist. Please contact the author of this note for any clarification.     History of Present Illness    CHIEF COMPLAINT:  Patient presents today for follow up of elevated blood pressure    HYPERTENSION:  He reports a home blood pressure reading of 155/80 today. He continues amlodipine 5 mg but experiences ankle swelling with socks leaving imprints.    UROLOGIC CONCERNS:  He reports worsening urologic symptoms correlating with recent weight gain, noting previous improvement with weight loss. He recently saw a urologist and has run out of prostate medication.    PSYCHIATRIC CARE:  He recently started seeing a psychiatrist and was initiated on Lamotrigine. Since starting the medication three months ago, he reports increased thirst, increased appetite, and a seven pound weight gain.    EXERCISE:  He exercises regularly with 3-4 weekly walks in [place] with his wife, and performs pushups and upper body exercises in his office.          Past Medical History:  Past Medical History:   Diagnosis Date    Fever blister 01/01/1985    Not recently    Former smoker 02/14/2022    High blood cholesterol     Hypertension     Joint pain 01/01/2020    Probably some arthritis    Squamous cell carcinoma 02/2016    LEFT MID FOREARM:       Home Medications:  Prior to Admission medications    Medication Sig Start Date End Date Taking? Authorizing Provider   finasteride (PROSCAR) 5 mg tablet Take 1  "tablet (5 mg total) by mouth once daily. 10/7/24 10/7/25 Yes Ward Alvarez, NP   multivitamin capsule Take 1 capsule by mouth once daily.   Yes Provider, Historical   amLODIPine (NORVASC) 5 MG tablet Take 1 tablet (5 mg total) by mouth once daily. 10/7/24 2/10/25 Yes Ward Alvarez, NP   losartan (COZAAR) 25 MG tablet Take 1 tablet (25 mg total) by mouth once daily. 2/10/25 2/10/26  Ward Alvarez, NP   acyclovir (ZOVIRAX) 400 MG tablet Take 1 tablet (400 mg total) by mouth 2 (two) times daily. Take it for 5 days.  Refills as needed. for 5 days 7/1/22 2/10/25  Wil Rhodes MD   alfuzosin (UROXATRAL) 10 mg Tb24 TAKE 1 TABLET(10 MG) BY MOUTH DAILY WITH BREAKFAST 9/5/23 2/10/25  Wil Rhodes MD   betamethasone dipropionate (DIPROLENE) 0.05 % ointment Apply topically 2 (two) times daily.  Patient taking differently: Apply topically 2 (two) times daily. prn 7/1/22 2/10/25  Wil Rhodes MD   pravastatin (PRAVACHOL) 20 MG tablet Take 1 tablet (20 mg total) by mouth once daily.  Patient not taking: Reported on 10/7/2024 1/22/24 2/10/25  Rach Sterling,    triamcinolone acetonide 0.1% (KENALOG) 0.1 % paste SMARTSIG:TO TEETH 3 Times Daily 10/6/24 2/10/25  Provider, Historical       Review of Systems:  Review of Systems   Constitutional: Negative.    Respiratory: Negative.     Cardiovascular: Negative.    Neurological:  Positive for headaches.       Health Maintainence:   Immunizations:  Health Maintenance         Date Due Completion Date    HIV Screening Never done ---    Pneumococcal Vaccines (Age 50+) (1 of 1 - PCV) Never done ---    Colorectal Cancer Screening 02/12/2025 2/12/2020    Hemoglobin A1c (Diabetic Prevention Screening) 10/09/2027 10/9/2024    Lipid Panel 10/09/2029 10/9/2024    TETANUS VACCINE 01/07/2030 1/7/2020             PHYSICAL EXAM     BP (!) 144/80   Pulse (!) 57   Ht 5' 10" (1.778 m)   Wt 85.2 kg (187 lb 13.3 oz)   SpO2 96%   BMI 26.95 kg/m²     Physical " Exam  Vitals reviewed.   Constitutional:       Appearance: He is well-developed.   HENT:      Head: Normocephalic and atraumatic.   Eyes:      Conjunctiva/sclera: Conjunctivae normal.   Cardiovascular:      Rate and Rhythm: Normal rate.   Pulmonary:      Effort: Pulmonary effort is normal. No respiratory distress.   Skin:     General: Skin is warm and dry.      Findings: No rash.   Neurological:      Mental Status: He is alert and oriented to person, place, and time.      Coordination: Coordination normal.   Psychiatric:         Behavior: Behavior normal.           ASSESSMENT/PLAN   Assessment & Plan    IMPRESSION:  - Assessed elevated blood pressure (144/80) and potential side effects of Lamotrigine  - Evaluated weight gain and increased appetite, considering possible connection to psychiatric medication  - Reviewed prostate symptoms, noting correlation with recent weight gain  - Will switch from amlodipine to losartan to address blood pressure and potentially alleviate ankle swelling    BIPOLAR II DISORDER:  - Patient reports slight improvement after seeing a psychiatrist.  - Continued Lamotrigine for Bipolar II disorder.  - Discussed potential side effects, including increased thirst and weight gain.  - Recommend patient address appetite increase and weight concerns with the psychiatrist managing Lamotrigine.    HYPERTENSION:  - Evaluated patient's blood pressure: elevated readings of 155/80 at home and 144 in office.  - Explained pain can increase blood pressure, but headaches are not typically associated unless very elevated.  - Discussed ideal targets (120s/70s) and acceptable range (below 140/90).  - Educated on long-term risks of consistently elevated blood pressure, including potential for dilated heart chambers and congestive heart failure.  - Discontinued amlodipine 5 mg and initiated losartan 25 mg daily.  - Instructed patient to monitor blood pressure at home and contact office via message next Monday  "with results.  - Scheduled follow up in 1-2 weeks to assess new medication's effectiveness and potentially adjust dosage.    EDEMA:  - Noted patient's report of swelling in ankles and sock imprints.  - Acknowledged ankle swelling, potentially a side effect of amlodipine.  - Switched from amlodipine to losartan to address possible medication-induced edema.    PROSTATE SYMPTOMS:  - Noted patient's report of recent worsening of prostate symptoms with weight gain.  - Symptoms are manageable but not ideal.  - Patient recently followed up with urologist and is currently off prostate medication due to running out.  - Suggested discussing with urologist if symptoms could be improved further.  - Acknowledged urologist's suggestion of surgery as a potential treatment option.          Dandy Gurrola" was seen today for hypertension.    Diagnoses and all orders for this visit:    Primary hypertension  -     losartan (COZAAR) 25 MG tablet; Take 1 tablet (25 mg total) by mouth once daily.          Ward Alvarez NP   Department of Internal Medicine - [facility]  4:08 PM  "

## 2025-03-19 ENCOUNTER — PATIENT MESSAGE (OUTPATIENT)
Dept: INTERNAL MEDICINE | Facility: CLINIC | Age: 65
End: 2025-03-19
Payer: COMMERCIAL

## 2025-03-31 ENCOUNTER — PATIENT MESSAGE (OUTPATIENT)
Dept: ADMINISTRATIVE | Facility: HOSPITAL | Age: 65
End: 2025-03-31
Payer: COMMERCIAL

## 2025-05-15 ENCOUNTER — PATIENT MESSAGE (OUTPATIENT)
Dept: INTERNAL MEDICINE | Facility: CLINIC | Age: 65
End: 2025-05-15

## 2025-05-15 DIAGNOSIS — I10 PRIMARY HYPERTENSION: ICD-10-CM

## 2025-05-15 RX ORDER — LOSARTAN POTASSIUM 50 MG/1
50 TABLET ORAL DAILY
Qty: 90 TABLET | Refills: 3 | Status: SHIPPED | OUTPATIENT
Start: 2025-05-15 | End: 2026-05-15

## 2025-05-22 ENCOUNTER — OFFICE VISIT (OUTPATIENT)
Dept: SLEEP MEDICINE | Facility: CLINIC | Age: 65
End: 2025-05-22
Attending: PSYCHIATRY & NEUROLOGY

## 2025-05-22 DIAGNOSIS — G47.33 OSA (OBSTRUCTIVE SLEEP APNEA): Primary | ICD-10-CM

## 2025-05-22 NOTE — PATIENT INSTRUCTIONS
SLEEP LAB (Sarah or Lawrence) will contact you to schedulethe sleep study. Their number is [phone] (ext [phone]). Please call them if you do not hear from them in 2 weeks from now.  The [facility] is located on 7th floor of the [facility]; [facility] located in [facility] ( 3rd floor [facility]).    SLEEP CLINIC (my assistant) will call you when the sleep study results are ready or I will message you through the portal with the results as we have discussed - if you have not heard from us by 2 weeks from the date of the study, or you can use [facility] to contact me.    Our clinic phone number is [phone] (ext 1)       You are advised to abstain from driving should you feel sleepy or drowsy.

## 2025-05-22 NOTE — PROGRESS NOTES
The patient location is: home  The chief complaint leading to consultation is: sleep disorder  Visit type: audiovisual  Each patient to whom he or she provides medical services by telemedicine is:  (1) informed of the relationship between the physician and patient and the respective role of any other health care provider with respect to management of the patient; and (2) notified that he or she may decline to receive medical services by telemedicine and may withdraw from such care at any time.  31 minutes of total time spent on the encounter, which includes face to face time and non-face to face time preparing to see the patient (eg, review of tests), Obtaining and/or reviewing separately obtained history, Documenting clinical information in the electronic or other health record, Independently interpreting results (not separately reported) and communicating results to the patient/family/caregiver, or Care coordination (not separately reported).          Dandy Gale is a 64 y.o. male is here to be evaluated for a sleep disorder; referred by Lawrence Denson MD.  Saw Dr. Mart in 2020 and was prescribed APAP then which he has been using compliantly since.    He was not aware of recall on [employer] devices and used it up until now.      The machine recently started failing to turn on, The current machine is old, malfunctioning and due for replacement.   Recently got Medicare     He needs top replace his machine urgently    APAP - DS1 set at 5-11 cm H2O    AHI - 1.hr  8.6 hrs  30/30 days use  90% mask fit.      Has Dreamwear 1/2?    Interrogated together with patient  on video  as there is not remote access    APAP 5-11    The patient  denied difficulty  with falling or staying asleep.    Dandy Gale  denies symptoms concerning for parasomnia except for occasional somniloquy.  The patient  denies auxiliary symptoms of narcolepsy including sleep onset paralysis, hypnagogic hallucinations, sleep attacks  and cataplexy.    Dandy Gale denied symptoms concerning for RLS; nocturnal leg movements have not been noticed.   The patient does not experience sleep related leg cramps.         Medications pertinent to sleep  disorders taken currently: - -  Previous  medications taken  for sleep disorders:  -    Sleep studies  HST 2019 - HST from 2019 is on file - meets 4% desaturation criteria - AHI 4% was 19/hr; compliant with APAP eversince.         5/20/2025     7:34 AM   EPWORTH SLEEPINESS SCALE TOTAL SCORE    Total score 4        Patient-reported             5/20/2025     7:34 AM   EPWORTH SLEEPINESS SCALE   Sitting and reading 0   Watching TV 1   Sitting, inactive in a public place (e.g. a theatre or a meeting) 0   As a passenger in a car for an hour without a break 1   Lying down to rest in the afternoon when circumstances permit 2   Sitting and talking to someone 0   Sitting quietly after a lunch without alcohol 0   In a car, while stopped for a few minutes in traffic 0   Total score 4        Patient-reported           2/10/2025   PHQ-9 Depression Patient Health Questionnaire   Over the last two weeks how often have you been bothered by little interest or pleasure in doing things 0   Over the last two weeks how often have you been bothered by feeling down, depressed or hopeless 0          3/28/2023     5:02 PM   GEORGE-7   Was test performed?    1. Feeling nervous, anxious, or on edge?    2. Not being able to stop or control worrying?    3. Worrying too much about different things?    4. Trouble relaxing?    5. Being so restless that it is hard to sit still?    6. Becoming easily annoyed or irritable?    7. Feeling afraid as if something awful might happen?    8. If you checked off any problems, how difficult have these problems made it for you to do your work, take care of things at home, or get along with other people?    GEORGE-7 Score    Number answered (out of first 7)    Interpretation        Information is  confidential and restricted. Go to Review Flowsheets to unlock data.           5/20/2025     7:34 AM   EPWORTH SLEEPINESS SCALE   Sitting and reading 0   Watching TV 1   Sitting, inactive in a public place (e.g. a theatre or a meeting) 0   As a passenger in a car for an hour without a break 1   Lying down to rest in the afternoon when circumstances permit 2   Sitting and talking to someone 0   Sitting quietly after a lunch without alcohol 0   In a car, while stopped for a few minutes in traffic 0   Total score 4        Patient-reported         5/20/2025     7:40 AM   Sleep Clinic New Patient   What time do you go to bed on a week day? (Give a range) 9:30   What time do you go to bed on a day off? (Give a range) 9:30   How long does it take you to fall asleep? (Give a range) 10 mins   On average, how many times per night do you wake up? 1   How long does it take you to fall back into sleep? (Give a range) 20 mins   What time do you wake up to start your day on a week day? (Give a range) 5 to 6AM   What time do you wake up to start your day on a day off? (Give a range) 5 to 6AM   What time do you get out of bed? (Give a range) 5 to 6AM   On average, how many hours do you sleep? 8   On average, how many naps do you take per day? 0   Rate your sleep quality from 0 to 5 (0-poor, 5-great). 4   Have you experienced:  Weight gain?   How much weight have you lost or gained (in lbs.) in the last year? 10LB   On average, how many times per night do you go to the bathroom?  1   Have you ever had a sleep study/CPAP machine/surgery for sleep apnea? Yes   Have you ever had a CPAP machine for sleep apnea? Yes   Have you ever had surgery for sleep apnea? No           5/20/2025     7:40 AM   Sleep Clinic ROS    Difficulty breathing through the nose?  Yes   Sore throat or dry mouth in the morning? Yes   Irregular or very fast heart beat?  No   Shortness of breath?  No   Acid reflux? Yes   Body aches and pains?  Yes   Morning  headaches? No   Dizziness? No   Mood changes?  No   Do you exercise?  Yes   Do you feel like moving your legs a lot?  No       DME:         PAST MEDICAL HISTORY:    Active Ambulatory Problems     Diagnosis Date Noted    BPH with urinary obstruction 04/14/2016    Elevated PSA 11/15/2018    Other hyperlipidemia 01/07/2020    ALONDRA (obstructive sleep apnea) 01/07/2020    Snoring     Senile purpura 08/18/2022    Cannabis dependence 11/05/2022    Job stress  03/29/2023    Marital stress 03/29/2023    Alcohol dependence with alcohol-induced mood disorder 03/29/2023    Bipolar II disorder 03/29/2023     Resolved Ambulatory Problems     Diagnosis Date Noted    Fracture of humerus, proximal, left, closed 10/04/2013    Fracture of metacarpal base of left hand, closed 02/23/2014    Hand pain, left 02/23/2014    First metacarpal bone fracture 02/28/2014    Screening for malignant neoplasm of colon 02/12/2020    Former smoker 02/14/2022    Incomplete bladder emptying 03/15/2022    Nocturia 09/02/2022    Laceration of skin of finger 03/29/2023     Past Medical History:   Diagnosis Date    Fever blister 01/01/1985    High blood cholesterol     Hypertension     Joint pain 01/01/2020    Squamous cell carcinoma 02/2016                PAST SURGICAL HISTORY:    Past Surgical History:   Procedure Laterality Date    COLONOSCOPY N/A 2/12/2020    Procedure: COLONOSCOPY;  Surgeon: Paulette George MD;  Location: [address];  Service: Endoscopy;  Laterality: N/A;    FRACTURE SURGERY      l shoulder plate       l thumb plate insertion      SKIN BIOPSY  1/1/1995    Should be in chart         FAMILY HISTORY:                Family History   Problem Relation Name Age of Onset    Hypertension Mother Rosa     Depression Mother Rosa     Prostate cancer Father Gene     Hearing loss Father Gene     Heart disease Maternal Grandmother      Heart disease Maternal Grandfather Corey     Cancer Paternal Grandmother Gwendolyn         liver?  Dx later stage    Heart attack Paternal Grandfather          poor diet    Melanoma Neg Hx         SOCIAL HISTORY:          Tobacco: Tobacco Use History[1]    alcohol use:    Social History     Substance and Sexual Activity   Alcohol Use Yes    Alcohol/week: 2.0 standard drinks of alcohol    Types: 2 Glasses of wine per week                   ALLERGIES:    Review of patient's allergies indicates:   Allergen Reactions    Shellfish containing products        CURRENT MEDICATIONS:  Current Medications[2]                     PHYSICAL EXAM:  There were no vitals taken for this visit.      ASSESSMENT:    1. ALONDRA (obstructive sleep apnea). Benefiting from CPAP use in terms of sleep continuity and daytime sleepiness. Complaint with APAP use.  HST from 2019 is on file - meets 4% desaturation criteria - AHI 4% was 19/hr; compliant with APAP eversince. Recently got Medicare. The current machine is old, malfunctioning , on recall (patient was not aware), and due for replacement.                     Assessment & Plan  ALONDRA (obstructive sleep apnea)    Orders:    CPAP FOR HOME USE        PLAN:    Diagnostic: ALONDRA     During our discussion today, we talked about the etiology of  ALONDRA as well as the potential ramifications of untreated sleep apnea, which could include daytime sleepiness, hypertension, heart disease and/or stroke.  We discussed potential treatment options, which could include weight loss, body positioning, continuous positive airway pressure (CPAP), or referral for surgical consideration. Meanwhile, he  is urged to avoid supine sleep, weight gain and alcoholic beverages since all of these can worsen ALONDRA.     The patient was given open opportunity to ask questions and/or express concerns about treatment plan. Two point patient identifier confirmed.       Precautions: The patient was advised to abstain from driving should he feel sleepy or drowsy.    Follow up: MD after the sleep study has been completed.     ESS ([place]  Sleepiness Scale) and other sleep medicine related questionnaires were reviewed with the patient.      46-minute visit. >50% spent counseling patient and coordination of care.  The patient was  cautioned against drowsy driving.                [1]   Social History  Tobacco Use   Smoking Status Former    Current packs/day: 0.00    Average packs/day: 1 pack/day for 10.0 years (10.0 ttl pk-yrs)    Types: Cigarettes    Start date: 1980    Quit date: 1990    Years since quittin.4   Smokeless Tobacco Former    Quit date: 1990   [2]   Current Outpatient Medications   Medication Sig Dispense Refill    finasteride (PROSCAR) 5 mg tablet Take 1 tablet (5 mg total) by mouth once daily. 90 tablet 3    losartan (COZAAR) 50 MG tablet Take 1 tablet (50 mg total) by mouth once daily. 90 tablet 3    multivitamin capsule Take 1 capsule by mouth once daily.       No current facility-administered medications for this visit.

## 2025-06-09 ENCOUNTER — PATIENT MESSAGE (OUTPATIENT)
Dept: ADMINISTRATIVE | Facility: HOSPITAL | Age: 65
End: 2025-06-09
Payer: MEDICARE

## 2025-06-10 VITALS — DIASTOLIC BLOOD PRESSURE: 80 MMHG | SYSTOLIC BLOOD PRESSURE: 164 MMHG

## 2025-06-10 NOTE — TELEPHONE ENCOUNTER
This morning my blood pressure is 164/80.  Charted blood pressure in remote blood pressure readings.

## 2025-06-12 ENCOUNTER — PATIENT OUTREACH (OUTPATIENT)
Dept: ADMINISTRATIVE | Facility: HOSPITAL | Age: 65
End: 2025-06-12
Payer: MEDICARE

## 2025-06-12 ENCOUNTER — OFFICE VISIT (OUTPATIENT)
Dept: INTERNAL MEDICINE | Facility: CLINIC | Age: 65
End: 2025-06-12
Payer: MEDICARE

## 2025-06-12 ENCOUNTER — PATIENT MESSAGE (OUTPATIENT)
Dept: ADMINISTRATIVE | Facility: HOSPITAL | Age: 65
End: 2025-06-12
Payer: MEDICARE

## 2025-06-12 ENCOUNTER — PATIENT MESSAGE (OUTPATIENT)
Dept: INTERNAL MEDICINE | Facility: CLINIC | Age: 65
End: 2025-06-12

## 2025-06-12 VITALS — BODY MASS INDEX: 26.95 KG/M2 | SYSTOLIC BLOOD PRESSURE: 152 MMHG | HEIGHT: 70 IN | DIASTOLIC BLOOD PRESSURE: 70 MMHG

## 2025-06-12 DIAGNOSIS — I10 PRIMARY HYPERTENSION: Primary | ICD-10-CM

## 2025-06-12 DIAGNOSIS — Z12.11 SCREENING FOR MALIGNANT NEOPLASM OF COLON: ICD-10-CM

## 2025-06-12 RX ORDER — LAMOTRIGINE 100 MG/1
100 TABLET, EXTENDED RELEASE ORAL DAILY
COMMUNITY
Start: 2025-05-19

## 2025-06-12 RX ORDER — LOSARTAN POTASSIUM AND HYDROCHLOROTHIAZIDE 12.5; 5 MG/1; MG/1
1 TABLET ORAL DAILY
Qty: 30 TABLET | Refills: 3 | Status: SHIPPED | OUTPATIENT
Start: 2025-06-12 | End: 2026-06-12

## 2025-06-12 NOTE — TELEPHONE ENCOUNTER
Left voice mail for patient to call the office back to schedule a virtual visit if he would like to, also sent portal message.

## 2025-06-12 NOTE — PROGRESS NOTES
The patient location is: [place]  The chief complaint leading to consultation is: bp/htn    Visit type: audiovisual    Face to Face time with patient: 19  25 minutes of total time spent on the encounter, which includes face to face time and non-face to face time preparing to see the patient (eg, review of tests), Obtaining and/or reviewing separately obtained history, DocAnswers submitted by the patient for this visit:  High Blood Pressure Questionnaire (Submitted on 6/12/2025)  Chief Complaint: Hypertension  Chronicity: chronic  Onset: more than 1 year ago  Progression since onset: gradually improving  Condition status: resistant  anxiety: No  blurred vision: No  malaise/fatigue: No  orthopnea: No  peripheral edema: No  PND: No  sweats: No  Agents associated with hypertension: no associated agents, NSAIDs  CAD risks: no known risk factors  Compliance problems: no compliance problems  Past treatments: nothing  Improvement on treatment: moderate  umenting clinical information in the electronic or other health record, Independently interpreting results (not separately reported) and communicating results to the patient/family/caregiver, or Care coordination (not separately reported).         Each patient to whom he or she provides medical services by telemedicine is:  (1) informed of the relationship between the physician and patient and the respective role of any other health care provider with respect to management of the patient; and (2) notified that he or she may decline to receive medical services by telemedicine and may withdraw from such care at any time.    Notes:     [facility]    Subjective:       Patient ID: Dandy Gale is a 64 y.o. male.    Chief Complaint: medication (Discuss blood pressure medication )      History was obtained from the patient and supplemented through chart review.  This patient is known to me.    HPI:    Patient is a 64 y.o. male w/   Past Medical History:    Diagnosis Date    Fever blister 01/01/1985    Not recently    Former smoker 02/14/2022    High blood cholesterol     Hypertension     Joint pain 01/01/2020    Probably some arthritis    Squamous cell carcinoma 02/2016    LEFT MID FOREARM:         History of Present Illness    CHIEF COMPLAINT:  Mr. Gale presents today for follow up of blood pressure    HYPERTENSION:  He is currently taking Losartan 50 mg but continues to experience elevated blood pressure readings. He previously tried Amlodipine 5 mg in the past but experienced ankle swelling with sock impressions.    MEDICAL HISTORY:  His last colonoscopy in 2020 revealed multiple polyps, with the largest measuring 5 mm. He is overdue for follow-up colonoscopy since February.    FAMILY HISTORY:  His maternal grandmother developed colon cancer in her seventies.    MEDICATIONS:  He takes Lamotrigine 100 mg extended release.    SOCIAL HISTORY:  He drinks 3-4 cups of mostly decaffeinated coffee daily and maintains consistent water intake throughout the day.      ROS:  Cardiovascular: positive lower extremity edema in the past  Endocrine: positive polyuria             Lab Results   Component Value Date    HGBA1C 5.2 10/09/2024    HGBA1C 5.1 02/14/2022    HGBA1C 5.2 12/27/2019     Lab Results   Component Value Date    LDLCALC 134.8 10/09/2024    CREATININE 0.9 10/09/2024         Wt Readings from Last 15 Encounters:   02/10/25 85.2 kg (187 lb 13.3 oz)   10/14/24 80 kg (176 lb 5.9 oz)   10/07/24 80.3 kg (177 lb 0.5 oz)   10/05/24 90.7 kg (200 lb)   10/04/24 83.9 kg (185 lb)   01/22/24 84.8 kg (186 lb 13.4 oz)   03/28/23 81.8 kg (180 lb 5.4 oz)   11/04/22 85.5 kg (188 lb 7.9 oz)   09/02/22 86.3 kg (190 lb 4.1 oz)   08/18/22 88.8 kg (195 lb 12.3 oz)   07/01/22 88.9 kg (196 lb)   05/19/22 89.1 kg (196 lb 8 oz)   04/26/22 86.8 kg (191 lb 5.8 oz)   03/15/22 85.9 kg (189 lb 6 oz)   02/14/22 81.4 kg (179 lb 7.3 oz)       Medical History  Past Medical History:   Diagnosis Date  "   Fever blister 01/01/1985    Not recently    Former smoker 02/14/2022    High blood cholesterol     Hypertension     Joint pain 01/01/2020    Probably some arthritis    Squamous cell carcinoma 02/2016    LEFT MID FOREARM:       Review of Systems   Respiratory:  Negative for shortness of breath.    Cardiovascular:  Negative for chest pain and palpitations.   Musculoskeletal:  Negative for neck pain.   Neurological:  Negative for headaches.         Surgical hx, family hx, social hx   Have been reviewed    Current Medications[1]    Objective:        Body mass index is 26.95 kg/m².  Vitals:    06/12/25 1535   BP: (!) 152/70   Height: 5' 10" (1.778 m)   PainSc: 0-No pain     Physical Exam  Vitals and nursing note reviewed.   Constitutional:       General: He is not in acute distress.     Appearance: Normal appearance. He is not ill-appearing.   HENT:      Head: Normocephalic and atraumatic.   Eyes:      General: No scleral icterus.  Pulmonary:      Effort: Pulmonary effort is normal.   Musculoskeletal:         General: No deformity.   Neurological:      Mental Status: He is alert and oriented to person, place, and time.   Psychiatric:         Behavior: Behavior normal.               Lab Results   Component Value Date    WBC 5.55 10/09/2024    HGB 15.4 10/09/2024    HCT 44.1 10/09/2024     10/09/2024    CHOL 202 (H) 10/09/2024    TRIG 96 10/09/2024    HDL 48 10/09/2024    ALT 12 10/09/2024    AST 17 10/09/2024     10/09/2024    K 3.8 10/09/2024     10/09/2024    CREATININE 0.9 10/09/2024    BUN 15 10/09/2024    CO2 25 10/09/2024    TSH 1.283 02/14/2022    PSA 8.4 (H) 10/09/2024    INR 1.0 02/28/2014    HGBA1C 5.2 10/09/2024       The 10-year ASCVD risk score (Courtney BOWERS, et al., 2019) is: 20.1%    Values used to calculate the score:      Age: 64 years      Sex: Male      Is Non- : No      Diabetic: No      Tobacco smoker: No      Systolic Blood Pressure: 152 mmHg      Is BP " "treated: Yes      HDL Cholesterol: 48 mg/dL      Total Cholesterol: 202 mg/dL    (Imaging have been independently reviewed)  none    Assessment:         1. Primary hypertension    2. Screening for malignant neoplasm of colon          Plan:     Dandy Gurrola" was seen today for medication.    Diagnoses and all orders for this visit:    Primary hypertension  -     losartan-hydrochlorothiazide 50-12.5 mg (HYZAAR) 50-12.5 mg per tablet; Take 1 tablet by mouth once daily.  -     Basic Metabolic Panel; Future    Screening for malignant neoplasm of colon  -     Ambulatory referral/consult to Endo Procedure ; Future        Colorecta screening  Elevated psa  Risk of hyokalemia  Nutition  htn    Assessment & Plan    IMPRESSION:  1. Considered increasing Losartan from 50 mg to 100 mg for better BP control.  2. Evaluated history with Amlodipine 5 mg, noting ankle swelling side effect.  3. Assessed current hydration habits and Lamotrigine use (100 mg extended release) in relation to proposed medication changes.  4. Reviewed October lab work, noting medium-low potassium levels.  5. Considered colonoscopy due to history of polyps (4 polyps in 2020, largest 5 mm) and time elapsed since last screening.  6. Noted elevated PSA from previous visit, plan for repeat PSA and urology follow-up overdue.  7. Discussed potential issues with Medicare Advantage plans and suggested review of plan choice.    ESSENTIAL HYPERTENSION:   Monitored the patient's blood pressure, which was 134/70 today but has been high at other times.   Last labs from October showed medium low potassium.   Blood pressure variability may be due to factors like sodium intake, bladder fullness, and muscle tension.   After evaluating management options, initiated Losartan 50 mg-HCTZ 12.5 mg combination pill to be taken in the morning.   Instructed patient to take half a tablet or skip the medication if experiencing dehydration, diarrhea, or vomiting.   Advised " increasing dietary potassium intake with foods like bananas, lentils, tomatoes, cooked spinach, broccoli, and green leafy vegetables (noting that cooked vegetables may release more potassium than raw).   Ordered potassium level check in 2 weeks.   Emphasized maintaining adequate hydration while on new BP medication and monitoring for symptoms of dizziness or lightheadedness.    ADVERSE EFFECT OF CALCIUM-CHANNEL BLOCKERS:   Noted the patient experienced ankle swelling with Amlodipine 5 mg, with socks leaving imprints.    HISTORY OF COLON POLYPS:   Documented the patient had polyps in 2020, with the largest being 5 mm.   Mr. Gale is due for a colonoscopy as the 5-year talha was passed in February.    FAMILY HISTORY OF COLON CANCER:   Documented grandmother had colon cancer in her seventies.   Assessed the patient as average risk for colon cancer due to grandmother's age at diagnosis.    FOLLOW-UP:   Scheduled follow up in 3 months.   Requested the patient to contact the office in 2 weeks to report BP numbers and any significant side effects from new medication.   Referred the patient to endoscopy procedure  for colonoscopy.             Tests to Keep You Healthy    Colon Cancer Screening: SCHEDULED  Last Blood Pressure <= 139/89 (6/12/2025): NO      Follow up in about 3 months (around 9/12/2025) for htn follow-up. or sooner prn            This note was generated with the assistance of ambient listening technology. Verbal consent was obtained by the patient and accompanying visitor(s) for the recording of patient appointment to facilitate this note. I attest to having reviewed and edited the generated note for accuracy, though some syntax or spelling errors may persist. Please contact the author of this note for any clarification.      Visit today is associated with current or anticipated ongoing medical care related to this patient's single serious condition/complex condition of elevated psa, htn. The patient  will return to see me as these issues will be followed longitudinally.    All medications were reviewed including potential side effects and risks/benefits.  Pt was counseled to call back if anything worsens or if questions arise.        Lawrence Denson MD  Family Medicine  [facility]  [address]  [address]  [place] [address]  Phone [phone]  Fax [phone]                             [1]   Current Outpatient Medications:     finasteride (PROSCAR) 5 mg tablet, Take 1 tablet (5 mg total) by mouth once daily., Disp: 90 tablet, Rfl: 3    lamotrigine XR (LAMICTAL XR) 100 mg TR24 XR tablet, Take 100 mg by mouth once daily., Disp: , Rfl:     multivitamin capsule, Take 1 capsule by mouth once daily., Disp: , Rfl:     losartan-hydrochlorothiazide 50-12.5 mg (HYZAAR) 50-12.5 mg per tablet, Take 1 tablet by mouth once daily., Disp: 30 tablet, Rfl: 3    polyethylene glycol (COLYTE) 240-22.72-6.72 -5.84 gram SolR, Take 4,000 mLs by mouth once. for 1 dose, Disp: 4000 mL, Rfl: 0

## 2025-06-13 ENCOUNTER — CLINICAL SUPPORT (OUTPATIENT)
Dept: ENDOSCOPY | Facility: HOSPITAL | Age: 65
End: 2025-06-13
Payer: MEDICARE

## 2025-06-13 ENCOUNTER — TELEPHONE (OUTPATIENT)
Dept: ENDOSCOPY | Facility: HOSPITAL | Age: 65
End: 2025-06-13

## 2025-06-13 DIAGNOSIS — Z12.11 SCREENING FOR MALIGNANT NEOPLASM OF COLON: ICD-10-CM

## 2025-06-13 DIAGNOSIS — Z12.11 SCREENING FOR MALIGNANT NEOPLASM OF COLON: Primary | ICD-10-CM

## 2025-06-13 NOTE — TELEPHONE ENCOUNTER
Contacted the patient for his PAT to schedule an endoscopy procedure(s) Colonoscopy . The patient did not answer the call and left a voice message requesting a call back.

## 2025-06-13 NOTE — TELEPHONE ENCOUNTER
Patient is scheduled for a Colonoscopy on 6/25/25 with Dr. DAVID Harris  Referral for procedure from PAT appointment called back. cf

## 2025-06-18 ENCOUNTER — E-CONSULT (OUTPATIENT)
Dept: DERMATOLOGY | Facility: CLINIC | Age: 65
End: 2025-06-18
Payer: MEDICARE

## 2025-06-18 DIAGNOSIS — L82.1 SEBORRHEIC KERATOSES: Primary | ICD-10-CM

## 2025-06-18 NOTE — CONSULTS
[facility]  Response for E-Consult     Patient Name: Dandy Gale  MRN: [id number]  Primary Care Provider: Lawrence Denson MD   Requesting Provider: Ward Alvarez, *  E-Consult to Dermatology  Consult performed by: Francia Rich MD  Consult ordered by: Ward Alvarez, NP  Reason for consult: Skin lesion  Assessment/Recommendations: Thank you for this consult. I favor this to be a benign seborrheic keratosis and can be safely monitored unless he wishes to have it removed.           Recommendation: as above     Additional future steps to consider: Reconsult if the condition worsens or fails to improve       Total time of Consultation: 5 minute    I did not speak to the requesting provider verbally about this.     *This eConsult is based on the clinical data available to me and is furnished without benefit of a physical examination. The eConsult will need to be interpreted in light of any clinical issues or changes in patient status not available to me at the time of filing this eConsults. Significant changes in patient condition or level of acuity should result in immediate formal consultation and reevaluation. Please alert me if you have further questions.    Thank you for this eConsult referral.     Francia Rich MD  [facility]

## 2025-06-19 ENCOUNTER — PATIENT MESSAGE (OUTPATIENT)
Dept: INTERNAL MEDICINE | Facility: CLINIC | Age: 65
End: 2025-06-19
Payer: MEDICARE

## 2025-06-19 NOTE — TELEPHONE ENCOUNTER
I spoke to [facility] and they said they got the medication approved, patient was informed.     Just wanted you to see patient message below.

## 2025-06-25 ENCOUNTER — ANESTHESIA (OUTPATIENT)
Dept: ENDOSCOPY | Facility: HOSPITAL | Age: 65
End: 2025-06-25
Payer: MEDICARE

## 2025-06-25 ENCOUNTER — ANESTHESIA EVENT (OUTPATIENT)
Dept: ENDOSCOPY | Facility: HOSPITAL | Age: 65
End: 2025-06-25
Payer: MEDICARE

## 2025-06-25 ENCOUNTER — HOSPITAL ENCOUNTER (OUTPATIENT)
Facility: HOSPITAL | Age: 65
Discharge: HOME | End: 2025-06-25
Attending: SURGERY | Admitting: SURGERY
Payer: MEDICARE

## 2025-06-25 VITALS
DIASTOLIC BLOOD PRESSURE: 73 MMHG | OXYGEN SATURATION: 96 % | HEIGHT: 72 IN | RESPIRATION RATE: 16 BRPM | SYSTOLIC BLOOD PRESSURE: 131 MMHG | BODY MASS INDEX: 25.95 KG/M2 | WEIGHT: 191.56 LBS | HEART RATE: 51 BPM | TEMPERATURE: 98 F

## 2025-06-25 DIAGNOSIS — Z12.11 ENCOUNTER FOR SCREENING COLONOSCOPY: Primary | ICD-10-CM

## 2025-06-25 DIAGNOSIS — Z12.11 SCREENING FOR MALIGNANT NEOPLASM OF COLON: ICD-10-CM

## 2025-06-25 PROCEDURE — 45385 COLONOSCOPY W/LESION REMOVAL: CPT | Mod: PT | Performed by: SURGERY

## 2025-06-25 PROCEDURE — 27201089 HC SNARE, DISP (ANY): Performed by: SURGERY

## 2025-06-25 PROCEDURE — 37000008 HC ANESTHESIA 1ST 15 MINUTES: Performed by: SURGERY

## 2025-06-25 PROCEDURE — 88305 TISSUE EXAM BY PATHOLOGIST: CPT | Mod: TC,91 | Performed by: SURGERY

## 2025-06-25 PROCEDURE — 63600175 PHARM REV CODE 636 W HCPCS: Performed by: NURSE ANESTHETIST, CERTIFIED REGISTERED

## 2025-06-25 PROCEDURE — 88305 TISSUE EXAM BY PATHOLOGIST: CPT | Mod: 26,,, | Performed by: PATHOLOGY

## 2025-06-25 PROCEDURE — 25000003 PHARM REV CODE 250: Performed by: NURSE ANESTHETIST, CERTIFIED REGISTERED

## 2025-06-25 PROCEDURE — 45385 COLONOSCOPY W/LESION REMOVAL: CPT | Mod: PT,,, | Performed by: SURGERY

## 2025-06-25 PROCEDURE — 37000009 HC ANESTHESIA EA ADD 15 MINS: Performed by: SURGERY

## 2025-06-25 RX ORDER — LIDOCAINE HYDROCHLORIDE 20 MG/ML
INJECTION INTRAVENOUS
Status: DISCONTINUED | OUTPATIENT
Start: 2025-06-25 | End: 2025-06-25

## 2025-06-25 RX ORDER — PROPOFOL 10 MG/ML
VIAL (ML) INTRAVENOUS CONTINUOUS PRN
Status: DISCONTINUED | OUTPATIENT
Start: 2025-06-25 | End: 2025-06-25

## 2025-06-25 RX ORDER — SODIUM CHLORIDE 9 MG/ML
INJECTION, SOLUTION INTRAVENOUS CONTINUOUS PRN
Status: DISCONTINUED | OUTPATIENT
Start: 2025-06-25 | End: 2025-06-25

## 2025-06-25 RX ORDER — PROPOFOL 10 MG/ML
INJECTION, EMULSION INTRAVENOUS
Status: DISCONTINUED | OUTPATIENT
Start: 2025-06-25 | End: 2025-06-25

## 2025-06-25 RX ORDER — SODIUM CHLORIDE 9 MG/ML
INJECTION, SOLUTION INTRAVENOUS CONTINUOUS
Status: DISCONTINUED | OUTPATIENT
Start: 2025-06-25 | End: 2025-06-25 | Stop reason: HOSPADM

## 2025-06-25 RX ADMIN — PROPOFOL 60 MG: 10 INJECTION, EMULSION INTRAVENOUS at 12:06

## 2025-06-25 RX ADMIN — LIDOCAINE HYDROCHLORIDE 100 MG: 20 INJECTION INTRAVENOUS at 12:06

## 2025-06-25 RX ADMIN — PROPOFOL 200 MCG/KG/MIN: 10 INJECTION, EMULSION INTRAVENOUS at 12:06

## 2025-06-25 RX ADMIN — SODIUM CHLORIDE: 0.9 INJECTION, SOLUTION INTRAVENOUS at 12:06

## 2025-06-25 NOTE — TRANSFER OF CARE
Anesthesia Transfer of Care Note    Patient: Dandy Gale    Procedure(s) Performed: Procedure(s) (LRB):  COLONOSCOPY, SCREENING, LOW RISK PATIENT (N/A)    Patient location: PACU    Anesthesia Type: general    Transport from OR: Transported from OR on 6-10 L/min O2 by face mask with adequate spontaneous ventilation    Post pain: adequate analgesia    Post assessment: no apparent anesthetic complications and tolerated procedure well    Post vital signs: stable    Level of consciousness: sedated and responds to stimulation    Nausea/Vomiting: no nausea/vomiting    Complications: none    Transfer of care protocol was followed    Last vitals: Visit Vitals  /71 (BP Location: Left arm, Patient Position: Lying)   Pulse 60   Temp 36.4 °C (97.5 °F) (Temporal)   Resp 16   Ht 6' (1.829 m)   Wt 86.9 kg (191 lb 9.3 oz)   SpO2 96%   BMI 25.98 kg/m²

## 2025-06-25 NOTE — ANESTHESIA PREPROCEDURE EVALUATION
06/25/2025  Dandy Gale is a 64 y.o., male. Here today for colonoscopy.    Past Surgical History:   Procedure Laterality Date    COLONOSCOPY N/A 2/12/2020    Procedure: COLONOSCOPY;  Surgeon: Paulette George MD;  [facility] ([address] [place] [address]);  Service: Endoscopy;  Laterality: N/A;    FRACTURE SURGERY      l shoulder plate       l thumb plate insertion      SKIN BIOPSY  1/1/1995    Should be in chart     Past Medical History:   Diagnosis Date    Fever blister 01/01/1985    Not recently    Former smoker 02/14/2022    High blood cholesterol     Hypertension     Joint pain 01/01/2020    Probably some arthritis    Squamous cell carcinoma 02/2016    LEFT MID FOREARM:           Pre-op Assessment    I have reviewed the Patient Summary Reports.     I have reviewed the Nursing Notes.    I have reviewed the Medications.     Review of Systems  Anesthesia Hx:  No problems with previous Anesthesia             Denies Family Hx of Anesthesia complications.    Denies Personal Hx of Anesthesia complications.                    Hematology/Oncology:  Hematology Normal   Oncology Normal                                   EENT/Dental:  EENT/Dental Normal           Cardiovascular:     Hypertension                                    Hypertension         Pulmonary:        Sleep Apnea     Obstructive Sleep Apnea (ALONDRA).           Renal/:  Renal/ Normal                 Hepatic/GI:  Hepatic/GI Normal                    Musculoskeletal:  Musculoskeletal Normal                Neurological:  Neurology Normal                                      Endocrine:  Endocrine Normal            Dermatological:  Skin Normal    Psych:  Psychiatric History                  Physical Exam  General: Well nourished    Airway:  Mallampati: II / I  Mouth Opening: Normal  TM Distance: Normal  Tongue: Normal  Neck ROM: Normal  ROM    Dental:  Intact        Anesthesia Plan  Type of Anesthesia, risks & benefits discussed:    Anesthesia Type: Gen Natural Airway  Intra-op Monitoring Plan: Standard ASA Monitors  Induction:  IV  Informed Consent: Informed consent signed with the Patient and all parties understand the risks and agree with anesthesia plan.  All questions answered.   ASA Score: 3  Day of Surgery Review of History & Physical: H&P Update referred to the surgeon/provider.    Ready For Surgery From Anesthesia Perspective.     .

## 2025-06-25 NOTE — PROVATION PATIENT INSTRUCTIONS
Discharge Summary/Instructions after an Endoscopic Procedure  Patient Name: Dandy Gale  Patient MRN: [id number]  Patient YOB: 1960 Wednesday, June 25, 2025  Madyson Harris MD  Dear patient,  As a result of recent federal legislation (The Federal Cures Act), you may   receive lab or pathology results from your procedure in your [facility]   account before your physician is able to contact you. Your physician or   their representative will relay the results to you with their   recommendations at their soonest availability.  Thank you,  RESTRICTIONS:  During your procedure today, you received medications for sedation.  These   medications may affect your judgment, balance and coordination.  Therefore,   for 24 hours, you have the following restrictions:   - DO NOT drive a car, operate machinery, make legal/financial decisions,   sign important papers or drink alcohol.    ACTIVITY:  Today: no heavy lifting, straining or running due to procedural   sedation/anesthesia.  The following day: return to full activity including work.  DIET:  Eat and drink normally unless instructed otherwise.     TREATMENT FOR COMMON SIDE EFFECTS:  - Mild abdominal pain, nausea, belching, bloating or excessive gas:  rest,   eat lightly and use a heating pad.  - Sore Throat: treat with throat lozenges and/or gargle with warm salt   water.  - Because air was used during the procedure, expelling large amounts of air   from your rectum or belching is normal.  - If a bowel prep was taken, you may not have a bowel movement for 1-3 days.    This is normal.  SYMPTOMS TO WATCH FOR AND REPORT TO YOUR PHYSICIAN:  1. Abdominal pain or bloating, other than gas cramps.  2. Chest pain.  3. Back pain.  4. Signs of infection such as: chills or fever occurring within 24 hours   after the procedure.  5. Rectal bleeding, which would show as bright red, maroon, or black stools.   (A tablespoon of blood from the rectum is not serious, especially if    hemorrhoids are present.)  6. Vomiting.  7. Weakness or dizziness.  GO DIRECTLY TO THE NEAREST EMERGENCY ROOM IF YOU HAVE ANY OF THE FOLLOWING:      Difficulty breathing              Chills and/or fever over 101 F   Persistent vomiting and/or vomiting blood   Severe abdominal pain   Severe chest pain   Black, tarry stools   Bleeding- more than one tablespoon   Any other symptom or condition that you feel may need urgent attention  Your doctor recommends these additional instructions:  If any biopsies were taken, your doctors clinic will contact you in 1 to 2   weeks with any results.  - Patient has a contact number available for emergencies.  The signs and   symptoms of potential delayed complications were discussed with the   patient.  Return to normal activities tomorrow.  Written discharge   instructions were provided to the patient.   - Resume previous diet.   - Continue present medications.   - Discharge patient to home (ambulatory).   - Repeat colonoscopy in 5 years for surveillance.  For questions, problems or results please call your physician - Madyson Harris MD at Work:  [phone].  [facility], EMERGENCY ROOM PHONE NUMBER: [phone]  IF A COMPLICATION OR EMERGENCY SITUATION ARISES AND YOU ARE UNABLE TO REACH   YOUR PHYSICIAN - GO DIRECTLY TO THE EMERGENCY ROOM.  MD Madyson Rock MD  6/25/2025 12:31:20 PM  This report has been verified and signed electronically.  Dear patient,  As a result of recent federal legislation (The Federal Cures Act), you may   receive lab or pathology results from your procedure in your [facility]   account before your physician is able to contact you. Your physician or   their representative will relay the results to you with their   recommendations at their soonest availability.  Thank you,  PROVATION

## 2025-06-25 NOTE — ANESTHESIA POSTPROCEDURE EVALUATION
Anesthesia Post Evaluation    Patient: Dandy Gale    Procedure(s) Performed: Procedure(s) (LRB):  COLONOSCOPY, SCREENING, LOW RISK PATIENT (N/A)    Final Anesthesia Type: general      Patient location during evaluation: PACU  Patient participation: Yes- Able to Participate  Level of consciousness: awake and alert  Post-procedure vital signs: reviewed and stable  Pain management: adequate  Airway patency: patent    PONV status at discharge: No PONV  Anesthetic complications: no      Cardiovascular status: blood pressure returned to baseline  Respiratory status: unassisted  Hydration status: euvolemic            Vitals Value Taken Time   /73 06/25/25 13:13   Temp 36.6 °C (97.9 °F) 06/25/25 12:38   Pulse 51 06/25/25 13:13   Resp 16 06/25/25 13:13   SpO2 96 % 06/25/25 13:13         Event Time   Out of Recovery 13:14:57         Pain/Fannie Score: Fannie Score: 10 (6/25/2025  1:13 PM)

## 2025-06-25 NOTE — PLAN OF CARE
ID band and fall risk band applied to pt. plan of care reviewed. pt has no questions at this time.

## 2025-06-25 NOTE — H&P
COLONOSCOPY HISTORY AND PHYSICAL EXAM    Procedure : Colonoscopy      INDICATIONS: personal history of colon polyps    Family Hx of CRC: none    Last Colonoscopy:  2020  Findings: polyps       Past Medical History:   Diagnosis Date    Fever blister 01/01/1985    Not recently    Former smoker 02/14/2022    High blood cholesterol     Hypertension     Joint pain 01/01/2020    Probably some arthritis    Squamous cell carcinoma 02/2016    LEFT MID FOREARM:     Sedation Problems: NO  Family History   Problem Relation Name Age of Onset    Hypertension Mother Rosa     Depression Mother Rosa     Prostate cancer Father Gene     Hearing loss Father Gene     Heart disease Maternal Grandmother      Heart disease Maternal Grandfather Corey     Cancer Paternal Grandmother Gwendolyn         liver? Dx later stage    Heart attack Paternal Grandfather          poor diet    Melanoma Neg Hx       Fam Hx of Sedation Problems: NO  Social History[1]    Review of Systems - Negative except   Respiratory ROS: no dyspnea  Cardiovascular ROS: no exertional chest pain  Gastrointestinal ROS: NO abdominal discomfort,  NO rectal bleeding  Musculoskeletal ROS: no muscular pain  Neurological ROS: no recent stroke    Physical Exam:  There were no vitals taken for this visit.  General: no distress  Head: normocephalic  Mallampati Score   Neck: supple, symmetrical, trachea midline  Lungs:  clear to auscultation bilaterally and normal respiratory effort  Heart: regular rate and rhythm and no murmur  Abdomen: soft, non-tender non-distented; bowel sounds normal; no masses,  no organomegaly  Extremities: no cyanosis or edema, or clubbing    ASA:  II    PLAN  COLONOSCOPY.    SedationPlan :MAC    The details of the procedure, the possible need for biopsy or polypectomy and the potential risks including bleeding, perforation, missed polyps were discussed in detail.    Madyson Harris MD, FACS, FASCRS  Staff Surgeon   Colon & Rectal Surgery              [1]   Social History  Socioeconomic History    Marital status:    Tobacco Use    Smoking status: Former     Current packs/day: 0.00     Average packs/day: 1 pack/day for 10.0 years (10.0 ttl pk-yrs)     Types: Cigarettes     Start date: 1980     Quit date: 1990     Years since quittin.5    Smokeless tobacco: Former     Quit date: 1990   Substance and Sexual Activity    Alcohol use: Yes     Alcohol/week: 2.0 standard drinks of alcohol     Types: 2 Glasses of wine per week    Drug use: Yes     Frequency: 7.0 times per week     Types: Marijuana    Sexual activity: Yes     Partners: Female     Birth control/protection: Post-menopausal     Social Drivers of Health     Financial Resource Strain: Low Risk  (2025)    Overall Financial Resource Strain (CARDIA)     Difficulty of Paying Living Expenses: Not hard at all   Food Insecurity: No Food Insecurity (2025)    Hunger Vital Sign     Worried About Running Out of Food in the Last Year: Never true     Ran Out of Food in the Last Year: Never true   Transportation Needs: No Transportation Needs (2025)    PRAPARE - Transportation     Lack of Transportation (Medical): No     Lack of Transportation (Non-Medical): No   Physical Activity: Insufficiently Active (2025)    Exercise Vital Sign     Days of Exercise per Week: 5 days     Minutes of Exercise per Session: 20 min   Stress: Stress Concern Present (2025)    [employer] [place] [employer] - Occupational Stress Questionnaire     Feeling of Stress : To some extent   Housing Stability: Low Risk  (2025)    Housing Stability Vital Sign     Unable to Pay for Housing in the Last Year: No     Number of Times Moved in the Last Year: 0     Homeless in the Last Year: No

## 2025-06-27 ENCOUNTER — RESULTS FOLLOW-UP (OUTPATIENT)
Dept: SURGERY | Facility: CLINIC | Age: 65
End: 2025-06-27

## 2025-06-27 LAB
ESTROGEN SERPL-MCNC: NORMAL PG/ML
INSULIN SERPL-ACNC: NORMAL U[IU]/ML
LAB AP CLINICAL INFORMATION: NORMAL
LAB AP GROSS DESCRIPTION: NORMAL
LAB AP PERFORMING LOCATION(S): NORMAL
LAB AP REPORT FOOTNOTES: NORMAL

## 2025-07-10 ENCOUNTER — TELEPHONE (OUTPATIENT)
Dept: INTERNAL MEDICINE | Facility: CLINIC | Age: 65
End: 2025-07-10
Payer: MEDICARE

## 2025-07-10 ENCOUNTER — CLINICAL SUPPORT (OUTPATIENT)
Dept: INTERNAL MEDICINE | Facility: CLINIC | Age: 65
End: 2025-07-10
Payer: MEDICARE

## 2025-07-10 VITALS — DIASTOLIC BLOOD PRESSURE: 61 MMHG | SYSTOLIC BLOOD PRESSURE: 113 MMHG

## 2025-07-10 DIAGNOSIS — I10 PRIMARY HYPERTENSION: Primary | ICD-10-CM

## 2025-07-10 NOTE — PROGRESS NOTES
Dandy Gale 64 y.o. male is here today for Blood Pressure check.   History of HTN yes.    Review of patient's allergies indicates:   Allergen Reactions    Shellfish containing products      Creatinine   Date Value Ref Range Status   10/09/2024 0.9 0.5 - 1.4 mg/dL Final     Sodium   Date Value Ref Range Status   10/09/2024 140 136 - 145 mmol/L Final     Potassium   Date Value Ref Range Status   10/09/2024 3.8 3.5 - 5.1 mmol/L Final   ]  Patient verifies taking blood pressure medications on a regular basis at the same time of the day.   Current Medications[1]  Does patient have record of home blood pressure readings yes. Readings have been averaging 113/61.   Last dose of blood pressure medication was taken at 10:00am.  Patient is asymptomatic.   Complains of none.      ,   .    Blood pressure reading after 15 minutes was 110/80, Pulse 56.  Dr. Denson notified.         [1]   Current Outpatient Medications:     finasteride (PROSCAR) 5 mg tablet, Take 1 tablet (5 mg total) by mouth once daily., Disp: 90 tablet, Rfl: 3    lamotrigine XR (LAMICTAL XR) 100 mg TR24 XR tablet, Take 100 mg by mouth once daily., Disp: , Rfl:     losartan-hydrochlorothiazide 50-12.5 mg (HYZAAR) 50-12.5 mg per tablet, Take 1 tablet by mouth once daily., Disp: 30 tablet, Rfl: 3    multivitamin capsule, Take 1 capsule by mouth once daily., Disp: , Rfl:

## 2025-07-10 NOTE — PROGRESS NOTES
Dandy Gale 64 y.o. male is here today for Blood Pressure check.   History of HTN yes.    Review of patient's allergies indicates:   Allergen Reactions    Shellfish containing products      Creatinine   Date Value Ref Range Status   10/09/2024 0.9 0.5 - 1.4 mg/dL Final     Sodium   Date Value Ref Range Status   10/09/2024 140 136 - 145 mmol/L Final     Potassium   Date Value Ref Range Status   10/09/2024 3.8 3.5 - 5.1 mmol/L Final   ]  Patient verifies taking blood pressure medications on a regular basis at the same time of the day.   Current Medications[1]  Does patient have record of home blood pressure readings yes. Readings have been averaging 113/61.   Last dose of blood pressure medication was taken at 10:00 AM.  Patient is asymptomatic.   Complains of N/A.      ,   .    Blood pressure reading after 15 minutes was 110/80, Pulse 56.  Dr. Denson notified.           [1]   Current Outpatient Medications:     finasteride (PROSCAR) 5 mg tablet, Take 1 tablet (5 mg total) by mouth once daily., Disp: 90 tablet, Rfl: 3    lamotrigine XR (LAMICTAL XR) 100 mg TR24 XR tablet, Take 100 mg by mouth once daily., Disp: , Rfl:     losartan-hydrochlorothiazide 50-12.5 mg (HYZAAR) 50-12.5 mg per tablet, Take 1 tablet by mouth once daily., Disp: 30 tablet, Rfl: 3    multivitamin capsule, Take 1 capsule by mouth once daily., Disp: , Rfl:

## 2025-07-10 NOTE — TELEPHONE ENCOUNTER
Dandy Gale 64 y.o. male is here today for Blood Pressure check.   History of HTN yes.          Review of patient's allergies indicates:   Allergen Reactions    Shellfish containing products              Creatinine   Date Value Ref Range Status   10/09/2024 0.9 0.5 - 1.4 mg/dL Final            Sodium   Date Value Ref Range Status   10/09/2024 140 136 - 145 mmol/L Final            Potassium   Date Value Ref Range Status   10/09/2024 3.8 3.5 - 5.1 mmol/L Final   ]  Patient verifies taking blood pressure medications on a regular basis at the same time of the day.   [Current Medications]    [Current Medications]     Current Outpatient Medications:     finasteride (PROSCAR) 5 mg tablet, Take 1 tablet (5 mg total) by mouth once daily., Disp: 90 tablet, Rfl: 3    lamotrigine XR (LAMICTAL XR) 100 mg TR24 XR tablet, Take 100 mg by mouth once daily., Disp: , Rfl:     losartan-hydrochlorothiazide 50-12.5 mg (HYZAAR) 50-12.5 mg per tablet, Take 1 tablet by mouth once daily., Disp: 30 tablet, Rfl: 3    multivitamin capsule, Take 1 capsule by mouth once daily., Disp: , Rfl:   Does patient have record of home blood pressure readings yes. Readings have been averaging 113/61.   Last dose of blood pressure medication was taken at 10:00am.  Patient is asymptomatic.   Complains of none.       ,   .     Blood pressure reading after 15 minutes was 110/80, Pulse 56.  Dr. Denson notified.

## 2025-07-23 ENCOUNTER — PATIENT MESSAGE (OUTPATIENT)
Dept: SLEEP MEDICINE | Facility: CLINIC | Age: 65
End: 2025-07-23
Payer: MEDICARE

## 2025-08-28 ENCOUNTER — OFFICE VISIT (OUTPATIENT)
Facility: CLINIC | Age: 65
End: 2025-08-28
Payer: MEDICARE

## 2025-08-28 DIAGNOSIS — I10 HYPERTENSION, UNSPECIFIED TYPE: ICD-10-CM

## 2025-08-28 DIAGNOSIS — G47.33 OBSTRUCTIVE SLEEP APNEA: Primary | ICD-10-CM

## 2025-08-28 PROCEDURE — 98006 SYNCH AUDIO-VIDEO EST MOD 30: CPT | Mod: 95,,,
